# Patient Record
Sex: FEMALE | Race: WHITE | NOT HISPANIC OR LATINO | Employment: FULL TIME | URBAN - METROPOLITAN AREA
[De-identification: names, ages, dates, MRNs, and addresses within clinical notes are randomized per-mention and may not be internally consistent; named-entity substitution may affect disease eponyms.]

---

## 2017-05-30 ENCOUNTER — OFFICE VISIT (OUTPATIENT)
Dept: FAMILY MEDICINE | Facility: CLINIC | Age: 62
End: 2017-05-30
Payer: OTHER GOVERNMENT

## 2017-05-30 VITALS
DIASTOLIC BLOOD PRESSURE: 74 MMHG | BODY MASS INDEX: 28.53 KG/M2 | SYSTOLIC BLOOD PRESSURE: 110 MMHG | HEART RATE: 97 BPM | OXYGEN SATURATION: 98 % | HEIGHT: 63 IN | WEIGHT: 161 LBS

## 2017-05-30 DIAGNOSIS — Z01.818 PREOP EXAMINATION: ICD-10-CM

## 2017-05-30 PROCEDURE — 99213 OFFICE O/P EST LOW 20 MIN: CPT | Mod: ,,, | Performed by: FAMILY MEDICINE

## 2017-05-30 RX ORDER — PANTOPRAZOLE SODIUM 40 MG/1
1 TABLET, DELAYED RELEASE ORAL
COMMUNITY
Start: 2016-06-02 | End: 2020-10-01 | Stop reason: SDUPTHER

## 2017-05-30 RX ORDER — CELECOXIB 200 MG/1
1 CAPSULE ORAL DAILY
COMMUNITY
Start: 2017-01-13 | End: 2020-10-01

## 2017-05-30 RX ORDER — FLUTICASONE PROPIONATE 50 MCG
2 SPRAY, SUSPENSION (ML) NASAL DAILY PRN
COMMUNITY
Start: 2017-01-10 | End: 2020-10-28 | Stop reason: SDUPTHER

## 2017-05-30 RX ORDER — LEVOTHYROXINE SODIUM 75 UG/1
1 TABLET ORAL DAILY
COMMUNITY
Start: 2017-01-10 | End: 2017-08-12 | Stop reason: SDUPTHER

## 2017-05-30 RX ORDER — LORATADINE 10 MG/1
1 TABLET ORAL DAILY
COMMUNITY
Start: 2016-08-23 | End: 2017-07-17 | Stop reason: SDUPTHER

## 2017-05-30 RX ORDER — PITAVASTATIN CALCIUM 2.09 MG/1
1 TABLET, FILM COATED ORAL NIGHTLY
COMMUNITY
Start: 2017-01-10 | End: 2020-10-01 | Stop reason: SINTOL

## 2017-05-30 NOTE — PROGRESS NOTES
Subjective:       Patient ID: Michelle Frazier is a 61 y.o. female.    Chief Complaint: Pre-op Exam (cataracts Dr. Vangie Deal)    Right cataract      Review of Systems   Constitutional: Negative for fatigue, fever and unexpected weight change.   HENT: Negative for congestion, ear discharge, ear pain, hearing loss and sore throat.    Eyes: Negative for visual disturbance.   Respiratory: Negative for cough, chest tightness and shortness of breath.    Cardiovascular: Negative for chest pain and leg swelling.   Gastrointestinal: Negative for abdominal pain, constipation, diarrhea, nausea and vomiting.   Genitourinary: Negative for difficulty urinating.   Musculoskeletal: Negative for back pain.   Neurological: Negative for dizziness, weakness and headaches.   Hematological: Negative for adenopathy.   Psychiatric/Behavioral: Negative for suicidal ideas.       Past Medical History:   Diagnosis Date    GERD (gastroesophageal reflux disease)     Hypothyroidism       Past Surgical History:   Procedure Laterality Date    APPENDECTOMY      CARPAL TUNNEL RELEASE Right        Family History   Problem Relation Age of Onset    Arthritis Mother     Hearing loss Mother     Vision loss Mother     Arthritis Father        Social History     Social History    Marital status:      Spouse name: N/A    Number of children: N/A    Years of education: N/A     Social History Main Topics    Smoking status: Former Smoker     Types: Cigarettes     Quit date: 5/30/2012    Smokeless tobacco: None    Alcohol use No    Drug use: No    Sexual activity: Not Asked     Other Topics Concern    None     Social History Narrative    None       Current Outpatient Prescriptions   Medication Sig Dispense Refill    celecoxib (CELEBREX) 200 MG capsule Take 1 capsule by mouth once daily at 6am.      fluticasone (FLONASE) 50 mcg/actuation nasal spray 2 sprays by Nasal route daily as needed.      levothyroxine (SYNTHROID) 75 MCG tablet Take  "1 tablet by mouth once daily at 6am.      loratadine (CLARITIN) 10 mg tablet Take 1 tablet by mouth once daily at 6am.      pantoprazole (PROTONIX) 40 MG tablet Take 1 tablet by mouth as needed.      pitavastatin (LIVALO) 2 mg Tab tablet Take 1 tablet by mouth every evening.       No current facility-administered medications for this visit.        Review of patient's allergies indicates:  No Known Allergies  Objective:    HPI     Pre-op Exam    Additional comments: cataracts Dr. Vangie Deal       Last edited by Edna Perez MA on 5/30/2017 11:10 AM. (History)    Review of Systems   Constitutional: Negative for fatigue, fever and unexpected weight change.   HENT: Negative for congestion, ear discharge, ear pain, hearing loss and sore throat.    Eyes: Negative for visual disturbance.   Respiratory: Negative for cough, chest tightness and shortness of breath.    Cardiovascular: Negative for chest pain and leg swelling.   Gastrointestinal: Negative for abdominal pain, constipation, diarrhea, nausea and vomiting.   Genitourinary: Negative for difficulty urinating.   Musculoskeletal: Negative for back pain.   Neurological: Negative for dizziness, weakness and headaches.   Endo/Heme/Allergies: Negative for adenopathy.   Psychiatric/Behavioral: Negative for suicidal ideas.     Blood pressure 110/74, pulse 97, height 5' 2.5" (1.588 m), weight 73 kg (161 lb), SpO2 98 %. Body mass index is 28.98 kg/m².   Physical Exam   Constitutional: She appears well-developed and well-nourished. She is cooperative. No distress.   HENT:   Head: Normocephalic and atraumatic.   Right Ear: Tympanic membrane normal.   Left Ear: Tympanic membrane normal.   Nose: Nose normal.   Mouth/Throat: Oropharynx is clear and moist.   Eyes: Conjunctivae, EOM and lids are normal. Lids are everted and swept, no foreign bodies found. Right pupil is round and reactive. Left pupil is round and reactive.   Neck: Trachea normal. Neck supple.   Cardiovascular: " Regular rhythm, S1 normal and S2 normal.    Pulmonary/Chest: Breath sounds normal. No respiratory distress. She has no wheezes. She has no rales.   Abdominal: Soft. Bowel sounds are normal. She exhibits no mass. There is no tenderness. There is no rigidity and no guarding.   Musculoskeletal: Normal range of motion.   Lymphadenopathy:     She has no cervical adenopathy.     She has no axillary adenopathy.   Neurological: She is alert.   Skin: Skin is warm and dry.   Psychiatric: She has a normal mood and affect. Her behavior is normal. Thought content normal.           Assessment:       1. Preop examination        Plan:       Michelle was seen today for pre-op exam.    Diagnoses and all orders for this visit:    Preop examination    Cleared for surgery in two days. Instructed not to use NSAIDS

## 2017-07-17 RX ORDER — LORATADINE 10 MG/1
TABLET ORAL
Qty: 90 TABLET | Refills: 2 | Status: SHIPPED | OUTPATIENT
Start: 2017-07-17 | End: 2020-10-01 | Stop reason: ALTCHOICE

## 2017-08-14 RX ORDER — LEVOTHYROXINE SODIUM 75 UG/1
TABLET ORAL
Qty: 90 TABLET | Refills: 0 | Status: SHIPPED | OUTPATIENT
Start: 2017-08-14 | End: 2017-11-26 | Stop reason: SDUPTHER

## 2017-11-27 RX ORDER — LEVOTHYROXINE SODIUM 75 UG/1
TABLET ORAL
Qty: 90 TABLET | Refills: 0 | Status: SHIPPED | OUTPATIENT
Start: 2017-11-27 | End: 2018-03-18 | Stop reason: SDUPTHER

## 2018-03-19 RX ORDER — LEVOTHYROXINE SODIUM 75 UG/1
TABLET ORAL
Qty: 90 TABLET | Refills: 0 | Status: SHIPPED | OUTPATIENT
Start: 2018-03-19 | End: 2018-07-01 | Stop reason: SDUPTHER

## 2018-07-02 RX ORDER — LEVOTHYROXINE SODIUM 75 UG/1
TABLET ORAL
Qty: 90 TABLET | Refills: 0 | Status: SHIPPED | OUTPATIENT
Start: 2018-07-02 | End: 2018-11-10 | Stop reason: SDUPTHER

## 2018-11-12 RX ORDER — LEVOTHYROXINE SODIUM 75 UG/1
TABLET ORAL
Qty: 90 TABLET | Refills: 0 | Status: SHIPPED | OUTPATIENT
Start: 2018-11-12 | End: 2019-05-23 | Stop reason: SDUPTHER

## 2019-05-24 RX ORDER — LEVOTHYROXINE SODIUM 75 UG/1
TABLET ORAL
Qty: 90 TABLET | Refills: 0 | Status: SHIPPED | OUTPATIENT
Start: 2019-05-24 | End: 2020-02-11

## 2020-02-11 RX ORDER — LEVOTHYROXINE SODIUM 75 UG/1
TABLET ORAL
Qty: 90 TABLET | Refills: 0 | Status: SHIPPED | OUTPATIENT
Start: 2020-02-11 | End: 2020-10-02 | Stop reason: SDUPTHER

## 2020-10-01 ENCOUNTER — OFFICE VISIT (OUTPATIENT)
Dept: FAMILY MEDICINE | Facility: CLINIC | Age: 65
End: 2020-10-01
Payer: MEDICARE

## 2020-10-01 VITALS
DIASTOLIC BLOOD PRESSURE: 100 MMHG | OXYGEN SATURATION: 95 % | HEIGHT: 63 IN | HEART RATE: 78 BPM | TEMPERATURE: 98 F | SYSTOLIC BLOOD PRESSURE: 140 MMHG | WEIGHT: 178 LBS | BODY MASS INDEX: 31.54 KG/M2

## 2020-10-01 DIAGNOSIS — E03.9 ACQUIRED HYPOTHYROIDISM: Primary | ICD-10-CM

## 2020-10-01 DIAGNOSIS — E78.5 DYSLIPIDEMIA: ICD-10-CM

## 2020-10-01 DIAGNOSIS — R03.0 ELEVATED BLOOD-PRESSURE READING WITHOUT DIAGNOSIS OF HYPERTENSION: ICD-10-CM

## 2020-10-01 DIAGNOSIS — K21.9 GASTROESOPHAGEAL REFLUX DISEASE, UNSPECIFIED WHETHER ESOPHAGITIS PRESENT: ICD-10-CM

## 2020-10-01 DIAGNOSIS — Z12.31 VISIT FOR SCREENING MAMMOGRAM: ICD-10-CM

## 2020-10-01 DIAGNOSIS — Z78.0 MENOPAUSE: ICD-10-CM

## 2020-10-01 PROBLEM — F32.A DEPRESSION: Status: ACTIVE | Noted: 2020-10-01

## 2020-10-01 PROBLEM — J30.1 HAY FEVER: Status: ACTIVE | Noted: 2020-10-01

## 2020-10-01 PROBLEM — K57.90 DIVERTICULOSIS OF INTESTINE: Status: ACTIVE | Noted: 2020-10-01

## 2020-10-01 PROBLEM — F32.A DEPRESSION: Status: RESOLVED | Noted: 2020-10-01 | Resolved: 2020-10-01

## 2020-10-01 PROBLEM — M25.50 ARTHRALGIA OF MULTIPLE JOINTS: Status: ACTIVE | Noted: 2020-10-01

## 2020-10-01 PROCEDURE — 99203 PR OFFICE/OUTPT VISIT, NEW, LEVL III, 30-44 MIN: ICD-10-PCS | Mod: S$GLB,,, | Performed by: INTERNAL MEDICINE

## 2020-10-01 PROCEDURE — 99203 OFFICE O/P NEW LOW 30 MIN: CPT | Mod: S$GLB,,, | Performed by: INTERNAL MEDICINE

## 2020-10-01 RX ORDER — PANTOPRAZOLE SODIUM 40 MG/1
40 TABLET, DELAYED RELEASE ORAL DAILY
Qty: 90 TABLET | Refills: 1 | Status: SHIPPED | OUTPATIENT
Start: 2020-10-01 | End: 2021-05-10 | Stop reason: SDUPTHER

## 2020-10-01 RX ORDER — LEVOTHYROXINE SODIUM 75 UG/1
75 TABLET ORAL DAILY
Qty: 90 TABLET | Refills: 0 | Status: CANCELLED | OUTPATIENT
Start: 2020-10-01

## 2020-10-01 RX ORDER — CETIRIZINE HYDROCHLORIDE 10 MG/1
10 TABLET ORAL DAILY
COMMUNITY
End: 2021-10-11

## 2020-10-01 NOTE — PROGRESS NOTES
Subjective:       Patient ID: Michelle Frazier is a 65 y.o. female.    Chief Complaint: Establish Care (new patient establishment)    Here to reestablish care; she hasn't been here in over 3 years.  She was seeing Dr. Cohen or Irina STARK in the past.  She has a h/o hypothyroidism, hyperlipidemia.  She has tried multiple statins and has gotten headaches with all of them.  She has been breaking her thyroid medication in half; she reports she started having some palpitations and called for instructions and was told to break meds in half but never had follow up or repeat labs.     Review of Systems   Constitutional: Positive for fatigue. Negative for chills, fever and unexpected weight change.   HENT: Positive for postnasal drip and sinus pressure. Negative for congestion, hearing loss, rhinorrhea, trouble swallowing and voice change.    Eyes: Negative for photophobia and visual disturbance.   Respiratory: Negative for apnea, cough, choking, chest tightness, shortness of breath and wheezing.    Cardiovascular: Negative for chest pain, palpitations and leg swelling.   Gastrointestinal: Negative for abdominal pain, blood in stool, constipation, diarrhea, nausea, rectal pain and vomiting.   Endocrine: Negative for cold intolerance, heat intolerance, polydipsia and polyuria.   Genitourinary: Negative for decreased urine volume, difficulty urinating, dysuria, frequency, genital sores, hematuria, menstrual problem, pelvic pain, urgency, vaginal bleeding and vaginal discharge.   Musculoskeletal: Positive for neck pain and neck stiffness. Negative for back pain, gait problem, joint swelling and myalgias. Arthralgias: fingers, feet right elbow.   Skin: Negative for color change, rash and wound.   Allergic/Immunologic: Negative for environmental allergies and food allergies.   Neurological: Positive for headaches (sinus; doesn't have headaches if takes zyrtec regularly). Negative for dizziness, tremors, seizures, syncope,  facial asymmetry, speech difficulty, weakness, light-headedness and numbness.   Hematological: Negative for adenopathy. Does not bruise/bleed easily.   Psychiatric/Behavioral: Negative for confusion, hallucinations, sleep disturbance and suicidal ideas. The patient is nervous/anxious.        Past Medical History:   Diagnosis Date    Arthralgia of multiple joints 10/1/2020    Diverticulosis of intestine 10/1/2020    Dyslipidemia 10/1/2020    GERD (gastroesophageal reflux disease)     Hypothyroidism       Past Surgical History:   Procedure Laterality Date    APPENDECTOMY      CARPAL TUNNEL RELEASE Right     COLONOSCOPY      EYE SURGERY      cataracts       Family History   Problem Relation Age of Onset    Arthritis Mother     Hearing loss Mother     Vision loss Mother     Arthritis Father     Colon cancer Paternal Grandmother         >80    Breast cancer Neg Hx        Social History     Socioeconomic History    Marital status:      Spouse name: Not on file    Number of children: Not on file    Years of education: Not on file    Highest education level: Not on file   Occupational History    Not on file   Social Needs    Financial resource strain: Not on file    Food insecurity     Worry: Not on file     Inability: Not on file    Transportation needs     Medical: Not on file     Non-medical: Not on file   Tobacco Use    Smoking status: Former Smoker     Types: Cigarettes     Quit date: 2012     Years since quittin.3    Smokeless tobacco: Never Used   Substance and Sexual Activity    Alcohol use: No    Drug use: No    Sexual activity: Yes     Partners: Male     Birth control/protection: Post-menopausal     Comment:    Lifestyle    Physical activity     Days per week: Not on file     Minutes per session: Not on file    Stress: Only a little   Relationships    Social connections     Talks on phone: Not on file     Gets together: Not on file     Attends [employer]  "service: Not on file     Active member of club or organization: Not on file     Attends meetings of clubs or organizations: Not on file     Relationship status: Not on file   Other Topics Concern    Not on file   Social History Narrative    Live with        Current Outpatient Medications   Medication Sig Dispense Refill    cetirizine (ZYRTEC) 10 MG tablet Take 10 mg by mouth once daily.      fluticasone (FLONASE) 50 mcg/actuation nasal spray 2 sprays by Nasal route daily as needed.      pantoprazole (PROTONIX) 40 MG tablet Take 1 tablet (40 mg total) by mouth once daily. 90 tablet 1    SYNTHROID 75 mcg tablet TAKE ONE TABLET BY MOUTH ONCE DAILY (Patient taking differently: Take 75 mcg by mouth once daily. Take 0.5 tablets daily) 90 tablet 0     No current facility-administered medications for this visit.        Review of patient's allergies indicates:  No Known Allergies  Objective:    HPI     Establish Care      Additional comments: new patient establishment          Last edited by Jia Lara MA on 10/1/2020 10:50 AM. (History)      Blood pressure (!) 140/100, pulse 78, temperature 97.9 °F (36.6 °C), temperature source Temporal, height 5' 2.5" (1.588 m), weight 80.7 kg (178 lb), SpO2 95 %. Body mass index is 32.04 kg/m².   Physical Exam  Vitals signs and nursing note reviewed.   Constitutional:       General: She is not in acute distress.     Appearance: She is well-developed. She is obese. She is not ill-appearing, toxic-appearing or diaphoretic.   HENT:      Head: Normocephalic and atraumatic.      Right Ear: Hearing, tympanic membrane, ear canal and external ear normal.      Left Ear: Hearing, tympanic membrane, ear canal and external ear normal.      Nose: Nose normal.      Mouth/Throat:      Pharynx: Uvula midline.   Eyes:      General: Lids are normal. No scleral icterus.        Right eye: No discharge.         Left eye: No discharge.      Conjunctiva/sclera: Conjunctivae normal.      Right " eye: Right conjunctiva is not injected. No hemorrhage.     Left eye: Left conjunctiva is not injected. No hemorrhage.     Pupils: Pupils are equal, round, and reactive to light.   Neck:      Thyroid: No thyromegaly.      Vascular: No carotid bruit.   Cardiovascular:      Rate and Rhythm: Normal rate and regular rhythm.      Pulses:           Dorsalis pedis pulses are 2+ on the right side and 2+ on the left side.      Heart sounds: Normal heart sounds. No murmur. No friction rub. No gallop.    Pulmonary:      Effort: Pulmonary effort is normal. No respiratory distress.      Breath sounds: Normal breath sounds. No wheezing, rhonchi or rales.   Abdominal:      General: Bowel sounds are normal. There is no distension or abdominal bruit.      Palpations: Abdomen is soft. There is no mass or pulsatile mass.      Tenderness: There is no abdominal tenderness. There is no guarding or rebound.      Hernia: No hernia is present.   Musculoskeletal:      Right lower leg: No edema.      Left lower leg: No edema.   Lymphadenopathy:      Cervical: No cervical adenopathy.   Skin:     General: Skin is warm and dry.   Neurological:      General: No focal deficit present.      Mental Status: She is alert.      Motor: No tremor.      Deep Tendon Reflexes: Reflexes are normal and symmetric.   Psychiatric:         Mood and Affect: Mood normal.         Speech: Speech normal.         Behavior: Behavior normal.             Assessment:       1. Acquired hypothyroidism    2. Gastroesophageal reflux disease, unspecified whether esophagitis present    3. Elevated blood-pressure reading without diagnosis of hypertension    4. Visit for screening mammogram    5. Menopause    6. Dyslipidemia        Plan:       Michelle was seen today for establish care.    Diagnoses and all orders for this visit:    Acquired hypothyroidism  Comments:  Check labs before refilling meds.  She gets name brand  Orders:  -     TSH; Future  -     TSH    Gastroesophageal  reflux disease, unspecified whether esophagitis present  -     pantoprazole (PROTONIX) 40 MG tablet; Take 1 tablet (40 mg total) by mouth once daily.    Elevated blood-pressure reading without diagnosis of hypertension  Comments:  Limit salt, caffeine.  May be nerves about new doctor; recheck in a month  Orders:  -     Comprehensive metabolic panel; Future  -     Urinalysis w/reflex to Microscopic; Future  -     Comprehensive metabolic panel  -     Urinalysis w/reflex to Microscopic    Visit for screening mammogram  -     Mammo Digital Screening Bilat; Future    Menopause  -     DXA Bone Density Spine And Hip; Future    Dyslipidemia  -     Lipid Panel; Future  -     Comprehensive metabolic panel; Future  -     Lipid Panel  -     Comprehensive metabolic panel    Other orders  -     Cancel: levothyroxine (SYNTHROID) 75 MCG tablet; Take 1 tablet (75 mcg total) by mouth once daily.

## 2020-10-02 DIAGNOSIS — E03.9 ACQUIRED HYPOTHYROIDISM: ICD-10-CM

## 2020-10-02 DIAGNOSIS — Z86.010 PERSONAL HISTORY OF COLONIC POLYPS: Primary | ICD-10-CM

## 2020-10-02 RX ORDER — LEVOTHYROXINE SODIUM 50 UG/1
50 TABLET ORAL DAILY
Qty: 90 TABLET | Refills: 1 | Status: SHIPPED | OUTPATIENT
Start: 2020-10-02 | End: 2021-05-10 | Stop reason: SDUPTHER

## 2020-10-06 LAB
ALBUMIN SERPL-MCNC: 4.6 G/DL (ref 3.8–4.8)
ALBUMIN/GLOB SERPL: 2.3 {RATIO} (ref 1.2–2.2)
ALP SERPL-CCNC: 128 IU/L (ref 39–117)
ALT SERPL-CCNC: 18 IU/L (ref 0–32)
APPEARANCE UR: CLEAR
AST SERPL-CCNC: 22 IU/L (ref 0–40)
BACTERIA #/AREA URNS HPF: NORMAL /[HPF]
BACTERIA UR CULT: ABNORMAL
BACTERIA UR CULT: ABNORMAL
BILIRUB SERPL-MCNC: 0.3 MG/DL (ref 0–1.2)
BILIRUB UR QL STRIP: NEGATIVE
BUN SERPL-MCNC: 10 MG/DL (ref 8–27)
BUN/CREAT SERPL: 13 (ref 12–28)
CALCIUM SERPL-MCNC: 9.5 MG/DL (ref 8.7–10.3)
CHLORIDE SERPL-SCNC: 109 MMOL/L (ref 96–106)
CHOLEST SERPL-MCNC: 285 MG/DL (ref 100–199)
CO2 SERPL-SCNC: 23 MMOL/L (ref 20–29)
COLOR UR: YELLOW
CREAT SERPL-MCNC: 0.8 MG/DL (ref 0.57–1)
EPI CELLS #/AREA URNS HPF: NORMAL /HPF (ref 0–10)
GLOBULIN SER CALC-MCNC: 2 G/DL (ref 1.5–4.5)
GLUCOSE SERPL-MCNC: 100 MG/DL (ref 65–99)
GLUCOSE UR QL: NEGATIVE
HDLC SERPL-MCNC: 76 MG/DL
HGB UR QL STRIP: NEGATIVE
KETONES UR QL STRIP: NEGATIVE
LDLC SERPL CALC-MCNC: 197 MG/DL (ref 0–99)
LEUKOCYTE ESTERASE UR QL STRIP: ABNORMAL
MICRO URNS: ABNORMAL
MUCOUS THREADS URNS QL MICRO: PRESENT
NITRITE UR QL STRIP: NEGATIVE
PH UR STRIP: 5.5 [PH] (ref 5–7.5)
POTASSIUM SERPL-SCNC: 4.3 MMOL/L (ref 3.5–5.2)
PROT SERPL-MCNC: 6.6 G/DL (ref 6–8.5)
PROT UR QL STRIP: NEGATIVE
RBC #/AREA URNS HPF: NORMAL /HPF (ref 0–2)
SODIUM SERPL-SCNC: 145 MMOL/L (ref 134–144)
SP GR UR: 1.01 (ref 1–1.03)
TRIGL SERPL-MCNC: 76 MG/DL (ref 0–149)
TSH SERPL DL<=0.005 MIU/L-ACNC: 3.05 UIU/ML (ref 0.45–4.5)
URINALYSIS REFLEX: ABNORMAL
UROBILINOGEN UR STRIP-MCNC: 0.2 MG/DL (ref 0.2–1)
VLDLC SERPL CALC-MCNC: 12 MG/DL (ref 5–40)
WBC #/AREA URNS HPF: NORMAL /HPF (ref 0–5)

## 2020-10-07 ENCOUNTER — HOSPITAL ENCOUNTER (OUTPATIENT)
Dept: RADIOLOGY | Facility: HOSPITAL | Age: 65
Discharge: HOME | End: 2020-10-07
Attending: INTERNAL MEDICINE
Payer: MEDICARE

## 2020-10-07 DIAGNOSIS — Z78.0 MENOPAUSE: ICD-10-CM

## 2020-10-07 PROCEDURE — 77080 DXA BONE DENSITY AXIAL: CPT | Mod: TC,PO

## 2020-10-20 ENCOUNTER — HOSPITAL ENCOUNTER (OUTPATIENT)
Dept: RADIOLOGY | Facility: HOSPITAL | Age: 65
Discharge: HOME | End: 2020-10-20
Attending: INTERNAL MEDICINE
Payer: MEDICARE

## 2020-10-20 DIAGNOSIS — Z12.31 VISIT FOR SCREENING MAMMOGRAM: ICD-10-CM

## 2020-10-20 PROCEDURE — 77067 SCR MAMMO BI INCL CAD: CPT | Mod: TC,PO

## 2020-10-28 ENCOUNTER — OFFICE VISIT (OUTPATIENT)
Dept: FAMILY MEDICINE | Facility: CLINIC | Age: 65
End: 2020-10-28
Payer: MEDICARE

## 2020-10-28 VITALS
BODY MASS INDEX: 31.71 KG/M2 | TEMPERATURE: 97 F | OXYGEN SATURATION: 98 % | SYSTOLIC BLOOD PRESSURE: 138 MMHG | WEIGHT: 179 LBS | HEIGHT: 63 IN | HEART RATE: 76 BPM | DIASTOLIC BLOOD PRESSURE: 62 MMHG

## 2020-10-28 DIAGNOSIS — M25.50 ARTHRALGIA OF MULTIPLE JOINTS: ICD-10-CM

## 2020-10-28 DIAGNOSIS — E78.00 PURE HYPERCHOLESTEROLEMIA: ICD-10-CM

## 2020-10-28 DIAGNOSIS — M85.89 OSTEOPENIA OF MULTIPLE SITES: ICD-10-CM

## 2020-10-28 DIAGNOSIS — R03.0 ELEVATED BLOOD-PRESSURE READING WITHOUT DIAGNOSIS OF HYPERTENSION: ICD-10-CM

## 2020-10-28 DIAGNOSIS — Z86.010 PERSONAL HISTORY OF COLONIC POLYPS: ICD-10-CM

## 2020-10-28 DIAGNOSIS — E03.9 ACQUIRED HYPOTHYROIDISM: Primary | ICD-10-CM

## 2020-10-28 PROCEDURE — 99213 PR OFFICE/OUTPT VISIT, EST, LEVL III, 20-29 MIN: ICD-10-PCS | Mod: S$GLB,,, | Performed by: INTERNAL MEDICINE

## 2020-10-28 PROCEDURE — 99213 OFFICE O/P EST LOW 20 MIN: CPT | Mod: S$GLB,,, | Performed by: INTERNAL MEDICINE

## 2020-10-28 RX ORDER — FLUTICASONE PROPIONATE 50 MCG
2 SPRAY, SUSPENSION (ML) NASAL DAILY PRN
Qty: 48 G | Refills: 3 | Status: SHIPPED | OUTPATIENT
Start: 2020-10-28 | End: 2021-10-11

## 2020-10-28 RX ORDER — EZETIMIBE 10 MG/1
10 TABLET ORAL DAILY
Qty: 90 TABLET | Refills: 1 | Status: SHIPPED | OUTPATIENT
Start: 2020-10-28 | End: 2021-05-10 | Stop reason: SDUPTHER

## 2020-10-28 RX ORDER — DICLOFENAC SODIUM 10 MG/G
2 GEL TOPICAL DAILY
COMMUNITY
Start: 2020-10-28 | End: 2023-12-20

## 2020-10-28 NOTE — PATIENT INSTRUCTIONS
Your recent labs revealed an elevation in your fasting blood sugar.  This indicates that you may be at risk for developing diabetes.  You can reduce this risk by reducing or eliminating sweets from your diet, using wheat bread/pasta, limiting potato intake, and walking at least 30 minutes per day.  We will do follow bloodwork at your next appointment.    Your bone density is low which may increase your risk of fracture in the future.  You should be getting at least 1200mg calcium and 400IU of Vitamin D daily.  Caltrate D or Oscal D are good choices if you aren't getting enough dietary calcium.  You should exercise regularly (walk at least 30 minutes daily) and avoid smoking.  You should repeat bone density testing in 2 years.

## 2020-10-28 NOTE — PROGRESS NOTES
Subjective:       Patient ID: Michelle Frazier is a 65 y.o. female.    Chief Complaint: Follow-up (4 weeks followup labs)    Here for follow up on blood pressure and labs.      Review of Systems   Constitutional: Negative for activity change, appetite change, chills, diaphoresis, fatigue, fever and unexpected weight change.   HENT: Positive for hearing loss, postnasal drip and rhinorrhea. Negative for congestion, ear discharge, ear pain, nosebleeds, sinus pressure, sinus pain, sneezing, sore throat, tinnitus, trouble swallowing and voice change.    Eyes: Negative for photophobia, pain, discharge, redness, itching and visual disturbance.   Respiratory: Negative for apnea, cough, choking, chest tightness, shortness of breath, wheezing and stridor.    Cardiovascular: Negative for chest pain, palpitations and leg swelling.   Gastrointestinal: Negative for abdominal distention, abdominal pain, anal bleeding, blood in stool, constipation, diarrhea, nausea, rectal pain and vomiting.   Endocrine: Negative for cold intolerance, heat intolerance, polydipsia, polyphagia and polyuria.   Genitourinary: Negative for decreased urine volume, difficulty urinating, dysuria, frequency, genital sores, hematuria, menstrual problem and urgency.   Musculoskeletal: Positive for arthralgias, joint swelling, neck pain and neck stiffness. Negative for back pain, gait problem and myalgias.   Skin: Positive for rash. Negative for color change, pallor and wound.   Allergic/Immunologic: Negative for environmental allergies and food allergies.   Neurological: Positive for headaches. Negative for dizziness, tremors, seizures, syncope, facial asymmetry, speech difficulty, weakness, light-headedness and numbness.   Hematological: Negative for adenopathy. Does not bruise/bleed easily.   Psychiatric/Behavioral: Positive for dysphoric mood and sleep disturbance. Negative for confusion, decreased concentration, hallucinations, self-injury and suicidal ideas.  The patient is nervous/anxious.        Past Medical History:   Diagnosis Date    Arthralgia of multiple joints 10/1/2020    Diverticulosis of intestine 10/1/2020    Dyslipidemia 10/1/2020    GERD (gastroesophageal reflux disease)     Hypothyroidism       Past Surgical History:   Procedure Laterality Date    APPENDECTOMY      CARPAL TUNNEL RELEASE Right     COLONOSCOPY      EYE SURGERY      cataracts       Family History   Problem Relation Age of Onset    Arthritis Mother     Hearing loss Mother     Vision loss Mother     Arthritis Father     Colon cancer Paternal Grandmother         >80    Breast cancer Paternal Grandmother        Social History     Socioeconomic History    Marital status:      Spouse name: Not on file    Number of children: Not on file    Years of education: Not on file    Highest education level: Not on file   Occupational History    Not on file   Social Needs    Financial resource strain: Not very hard    Food insecurity     Worry: Never true     Inability: Never true    Transportation needs     Medical: No     Non-medical: No   Tobacco Use    Smoking status: Former Smoker     Types: Cigarettes     Quit date: 2012     Years since quittin.4    Smokeless tobacco: Never Used   Substance and Sexual Activity    Alcohol use: No     Frequency: Never    Drug use: No    Sexual activity: Yes     Partners: Male     Birth control/protection: Post-menopausal     Comment:    Lifestyle    Physical activity     Days per week: 2 days     Minutes per session: 30 min    Stress: Very much   Relationships    Social connections     Talks on phone: Never     Gets together: Never     Attends Sikhism service: Not on file     Active member of club or organization: Yes     Attends meetings of clubs or organizations: More than 4 times per year     Relationship status:    Other Topics Concern    Not on file   Social History Narrative    Live with   "      Current Outpatient Medications   Medication Sig Dispense Refill    cetirizine (ZYRTEC) 10 MG tablet Take 10 mg by mouth once daily.      fluticasone propionate (FLONASE) 50 mcg/actuation nasal spray 2 sprays (100 mcg total) by Each Nostril route daily as needed. 48 g 3    levothyroxine (SYNTHROID) 50 MCG tablet Take 1 tablet (50 mcg total) by mouth once daily. 90 tablet 1    pantoprazole (PROTONIX) 40 MG tablet Take 1 tablet (40 mg total) by mouth once daily. 90 tablet 1    diclofenac sodium (VOLTAREN) 1 % Gel Apply 2 g topically once daily.      ezetimibe (ZETIA) 10 mg tablet Take 1 tablet (10 mg total) by mouth once daily. 90 tablet 1     No current facility-administered medications for this visit.        Review of patient's allergies indicates:  No Known Allergies  Objective:    HPI     Follow-up      Additional comments: 4 weeks followup labs          Last edited by Jia Lara MA on 10/28/2020 11:12 AM. (History)      Blood pressure 138/62, pulse 76, temperature 97.2 °F (36.2 °C), temperature source Temporal, height 5' 2.5" (1.588 m), weight 81.2 kg (179 lb), SpO2 98 %. Body mass index is 32.22 kg/m².   Physical Exam  Vitals signs and nursing note reviewed.   Constitutional:       General: She is not in acute distress.     Appearance: She is well-developed. She is obese. She is not ill-appearing, toxic-appearing or diaphoretic.   HENT:      Head: Normocephalic and atraumatic.   Eyes:      General: No scleral icterus.        Right eye: No discharge.         Left eye: No discharge.      Conjunctiva/sclera: Conjunctivae normal.   Neck:      Vascular: No carotid bruit.   Cardiovascular:      Rate and Rhythm: Normal rate and regular rhythm.      Heart sounds: Normal heart sounds. No murmur.   Pulmonary:      Effort: Pulmonary effort is normal. No respiratory distress.      Breath sounds: Normal breath sounds. No decreased breath sounds, wheezing, rhonchi or rales.   Abdominal:      General: There is " no distension.      Palpations: Abdomen is soft.      Tenderness: There is no abdominal tenderness. There is no guarding or rebound.   Musculoskeletal:      Left hand: She exhibits deformity (thickening of PIP joints of 2nd and 3rd fingers).      Right lower leg: No edema.      Left lower leg: No edema.   Skin:     General: Skin is warm and dry.   Neurological:      Mental Status: She is alert.      Motor: No tremor.   Psychiatric:         Mood and Affect: Mood normal.         Speech: Speech normal.         Behavior: Behavior normal.           Office Visit on 10/01/2020   Component Date Value Ref Range Status    Cholesterol 10/01/2020 285* 100 - 199 mg/dL Final    Triglycerides 10/01/2020 76  0 - 149 mg/dL Final    HDL 10/01/2020 76  >39 mg/dL Final    VLDL Cholesterol Juan Manuel 10/01/2020 12  5 - 40 mg/dL Final    LDL Calculated 10/01/2020 197* 0 - 99 mg/dL Final    Glucose 10/01/2020 100* 65 - 99 mg/dL Final    BUN 10/01/2020 10  8 - 27 mg/dL Final    Creatinine 10/01/2020 0.80  0.57 - 1.00 mg/dL Final    eGFR if non African American 10/01/2020 77  >59 mL/min/1.73 Final    eGFR if African American 10/01/2020 89  >59 mL/min/1.73 Final    BUN/Creatinine Ratio 10/01/2020 13  12 - 28 Final    Sodium 10/01/2020 145* 134 - 144 mmol/L Final    Potassium 10/01/2020 4.3  3.5 - 5.2 mmol/L Final    Chloride 10/01/2020 109* 96 - 106 mmol/L Final    CO2 10/01/2020 23  20 - 29 mmol/L Final    Calcium 10/01/2020 9.5  8.7 - 10.3 mg/dL Final    Protein, Total 10/01/2020 6.6  6.0 - 8.5 g/dL Final    Albumin 10/01/2020 4.6  3.8 - 4.8 g/dL Final    Globulin, Total 10/01/2020 2.0  1.5 - 4.5 g/dL Final    Albumin/Globulin Ratio 10/01/2020 2.3* 1.2 - 2.2 Final    Total Bilirubin 10/01/2020 0.3  0.0 - 1.2 mg/dL Final    Alkaline Phosphatase 10/01/2020 128* 39 - 117 IU/L Final    AST 10/01/2020 22  0 - 40 IU/L Final    ALT 10/01/2020 18  0 - 32 IU/L Final    TSH 10/01/2020 3.050  0.450 - 4.500 uIU/mL Final    Specific  Gravity, UA 10/01/2020 1.009  1.005 - 1.030 Final    pH, UA 10/01/2020 5.5  5.0 - 7.5 Final    Color, UA 10/01/2020 Yellow  Yellow Final    Clarity, UA 10/01/2020 Clear  Clear Final    Leukocytes, UA 10/01/2020 Trace* Negative Final    Protein, UA 10/01/2020 Negative  Negative/Trace Final    Glucose, UA 10/01/2020 Negative  Negative Final    Ketones, UA 10/01/2020 Negative  Negative Final    Occult Blood UA 10/01/2020 Negative  Negative Final    Bilirubin, UA 10/01/2020 Negative  Negative Final    Urobilinogen, UA 10/01/2020 0.2  0.2 - 1.0 mg/dL Final    Nitrite, UA 10/01/2020 Negative  Negative Final    Microscopic Examination 10/01/2020 See below:   Final    Microscopic was indicated and was performed.    Urinalysis Reflex 10/01/2020 Comment   Final    This specimen has reflexed to a Urine Culture.    WBC, UA 10/01/2020 0-5  0 - 5 /hpf Final    RBC, UA 10/01/2020 None seen  0 - 2 /hpf Final    Epithelial Cells (non renal) 10/01/2020 0-10  0 - 10 /hpf Final    Mucus, UA 10/01/2020 Present  Not Estab. Final    Bacteria, UA 10/01/2020 None seen  None seen/Few Final    Urine Culture, Routine 10/01/2020 Final report*  Final    Result 1 10/01/2020 Comment*  Final    Comment: Beta hemolytic Streptococcus, group B  Penicillin and ampicillin are drugs of choice for treatment of  beta-hemolytic streptococcal infections. Susceptibility testing of  penicillins and other beta-lactam agents approved by the FDA for  treatment of beta-hemolytic streptococcal infections need not be  performed routinely because nonsusceptible isolates are extremely  rare in any beta-hemolytic streptococcus and have not been reported  for Streptococcus pyogenes (group A). (CLSI)  10,000-25,000 colony forming units per mL     ]  Assessment:       1. Acquired hypothyroidism    2. Pure hypercholesterolemia    3. Osteopenia of multiple sites    4. Arthralgia of multiple joints    5. Elevated blood-pressure reading without diagnosis  of hypertension    6. Personal history of colonic polyps        Plan:       Michelle was seen today for follow-up.    Diagnoses and all orders for this visit:    Acquired hypothyroidism  Comments:  TSH in range    Pure hypercholesterolemia  Comments:  Intolerant to statins.  Will try zetia    Orders:  -     ezetimibe (ZETIA) 10 mg tablet; Take 1 tablet (10 mg total) by mouth once daily.    Osteopenia of multiple sites  Comments:  Discussed calcium, vitamin D supplements, exercise    Arthralgia of multiple joints  Comments:  Continue NSAIDs,  Discussed voltaren gel for hands  Orders:  -     diclofenac sodium (VOLTAREN) 1 % Gel; Apply 2 g topically once daily.    Elevated blood-pressure reading without diagnosis of hypertension  Comments:  Better today; continue to monitor    Personal history of colonic polyps  -     Ambulatory referral/consult to Gastroenterology; Future    Other orders  -     fluticasone propionate (FLONASE) 50 mcg/actuation nasal spray; 2 sprays (100 mcg total) by Each Nostril route daily as needed.

## 2020-11-04 ENCOUNTER — OFFICE VISIT (OUTPATIENT)
Dept: URGENT CARE | Facility: CLINIC | Age: 65
End: 2020-11-04
Payer: MEDICARE

## 2020-11-04 VITALS
DIASTOLIC BLOOD PRESSURE: 88 MMHG | HEART RATE: 90 BPM | RESPIRATION RATE: 16 BRPM | OXYGEN SATURATION: 96 % | SYSTOLIC BLOOD PRESSURE: 139 MMHG | TEMPERATURE: 98 F

## 2020-11-04 DIAGNOSIS — J02.9 SORE THROAT: ICD-10-CM

## 2020-11-04 DIAGNOSIS — U07.1 COVID-19 VIRUS DETECTED: ICD-10-CM

## 2020-11-04 DIAGNOSIS — R52 GENERALIZED BODY ACHES: Primary | ICD-10-CM

## 2020-11-04 LAB
CTP QC/QA: YES
FLUAV AG NPH QL: NEGATIVE
FLUBV AG NPH QL: NEGATIVE
S PYO RRNA THROAT QL PROBE: NEGATIVE
SARS-COV-2 RDRP RESP QL NAA+PROBE: POSITIVE

## 2020-11-04 PROCEDURE — 87804 POCT INFLUENZA A/B: ICD-10-PCS | Mod: 59,QW,, | Performed by: NURSE PRACTITIONER

## 2020-11-04 PROCEDURE — 87880 POCT RAPID STREP A: ICD-10-PCS | Mod: QW,,, | Performed by: NURSE PRACTITIONER

## 2020-11-04 PROCEDURE — 87880 STREP A ASSAY W/OPTIC: CPT | Mod: QW,,, | Performed by: NURSE PRACTITIONER

## 2020-11-04 PROCEDURE — 99204 OFFICE O/P NEW MOD 45 MIN: CPT | Mod: S$GLB,,, | Performed by: NURSE PRACTITIONER

## 2020-11-04 PROCEDURE — 87804 INFLUENZA ASSAY W/OPTIC: CPT | Mod: QW,,, | Performed by: NURSE PRACTITIONER

## 2020-11-04 PROCEDURE — U0002: ICD-10-PCS | Mod: QW,S$GLB,, | Performed by: NURSE PRACTITIONER

## 2020-11-04 PROCEDURE — 99204 PR OFFICE/OUTPT VISIT, NEW, LEVL IV, 45-59 MIN: ICD-10-PCS | Mod: S$GLB,,, | Performed by: NURSE PRACTITIONER

## 2020-11-04 PROCEDURE — U0002 COVID-19 LAB TEST NON-CDC: HCPCS | Mod: QW,S$GLB,, | Performed by: NURSE PRACTITIONER

## 2020-11-04 NOTE — LETTER
"Michelle"Kuldip Frazeir was seen and treated in our Urgent Care on 11/4/2020.     COVID-19 is present in our communities across the state. There is limited testing for COVID at this time, so not all patients can be tested. In this situation, your employee meets the following criteria:    Michelle Frazier has met the criteria for COVID-19 testing and has a POSITIVE result. She can return to work once they are asymptomatic for 72 hours without the use of fever reducing medications AND at least ten days from the first positive result.     If you have any questions or concerns, or if I can be of further assistance, please do not hesitate to contact me.    Sincerely,       Annamarie STARK"

## 2020-11-04 NOTE — PROGRESS NOTES
Subjective:       Patient ID: Michelle Frazier is a 65 y.o. female.    Chief Complaint: No chief complaint on file.    Patient presents today for covid testing. She is having HA, fever, body aches and mild sob.     Review of Systems   Constitutional: Positive for fatigue and fever. Negative for chills.   HENT: Negative for congestion.    Respiratory: Positive for cough and shortness of breath.    Cardiovascular: Negative for chest pain.   Gastrointestinal: Negative for abdominal pain, diarrhea, nausea and vomiting.   Genitourinary: Negative for dysuria.   Musculoskeletal: Positive for myalgias. Negative for neck stiffness.   Neurological: Positive for headaches. Negative for dizziness and light-headedness.       Objective:      Vitals:    11/04/20 1352   BP: 139/88   Pulse: 90   Resp: 16   Temp: 97.9 °F (36.6 °C)     Physical Exam  Constitutional:       General: She is not in acute distress.     Appearance: Normal appearance. She is normal weight. She is not ill-appearing.   HENT:      Head: Normocephalic and atraumatic.      Right Ear: External ear normal.      Left Ear: External ear normal.   Eyes:      General: No scleral icterus.  Cardiovascular:      Rate and Rhythm: Normal rate and regular rhythm.      Heart sounds: Normal heart sounds.   Pulmonary:      Effort: Pulmonary effort is normal.      Breath sounds: Normal breath sounds.   Skin:     Capillary Refill: Capillary refill takes less than 2 seconds.   Neurological:      Mental Status: She is alert and oriented to person, place, and time.   Psychiatric:         Mood and Affect: Mood normal.         Behavior: Behavior normal.         Thought Content: Thought content normal.         Judgment: Judgment normal.         Assessment:       1. Generalized body aches    2. Sore throat        Plan:       Generalized body aches  -     POCT COVID-19 Rapid Screening  -     POCT Influenza A/B    Sore throat  -     POCT rapid strep A    Rapid COVID test is positive. Patient  notified in clinic. ED precautions discussed.   No follow-ups on file.

## 2020-11-11 ENCOUNTER — HOSPITAL ENCOUNTER (INPATIENT)
Facility: HOSPITAL | Age: 65
LOS: 4 days | Discharge: HOME | DRG: 177 | End: 2020-11-17
Attending: EMERGENCY MEDICINE | Admitting: INTERNAL MEDICINE
Payer: MEDICARE

## 2020-11-11 DIAGNOSIS — J12.82 PNEUMONIA DUE TO COVID-19 VIRUS: Primary | ICD-10-CM

## 2020-11-11 DIAGNOSIS — U07.1 PNEUMONIA DUE TO COVID-19 VIRUS: Primary | ICD-10-CM

## 2020-11-11 DIAGNOSIS — Z20.822 SUSPECTED COVID-19 VIRUS INFECTION: ICD-10-CM

## 2020-11-11 PROBLEM — J96.01 ACUTE HYPOXEMIC RESPIRATORY FAILURE: Status: ACTIVE | Noted: 2020-11-11

## 2020-11-11 PROBLEM — E86.0 DEHYDRATION: Status: ACTIVE | Noted: 2020-11-11

## 2020-11-11 PROBLEM — R51.9 ACUTE INTRACTABLE HEADACHE: Status: ACTIVE | Noted: 2020-11-11

## 2020-11-11 LAB
ABO + RH BLD: NORMAL
ALBUMIN SERPL BCP-MCNC: 3.2 G/DL (ref 3.5–5.2)
ALP SERPL-CCNC: 143 U/L (ref 55–135)
ALT SERPL W/O P-5'-P-CCNC: 47 U/L (ref 10–44)
ANION GAP SERPL CALC-SCNC: 11 MMOL/L (ref 8–16)
AST SERPL-CCNC: 49 U/L (ref 10–40)
BASOPHILS # BLD AUTO: 0.01 K/UL (ref 0–0.2)
BASOPHILS NFR BLD: 0.2 % (ref 0–1.9)
BILIRUB SERPL-MCNC: 0.3 MG/DL (ref 0.1–1)
BLD GP AB SCN CELLS X3 SERPL QL: NORMAL
BNP SERPL-MCNC: 38 PG/ML (ref 0–99)
BUN SERPL-MCNC: 11 MG/DL (ref 8–23)
CALCIUM SERPL-MCNC: 8.4 MG/DL (ref 8.7–10.5)
CHLORIDE SERPL-SCNC: 108 MMOL/L (ref 95–110)
CK SERPL-CCNC: 25 U/L (ref 20–180)
CO2 SERPL-SCNC: 25 MMOL/L (ref 23–29)
CREAT SERPL-MCNC: 0.7 MG/DL (ref 0.5–1.4)
CRP SERPL-MCNC: 26.4 MG/L (ref 0–8.2)
DIFFERENTIAL METHOD: ABNORMAL
EOSINOPHIL # BLD AUTO: 0 K/UL (ref 0–0.5)
EOSINOPHIL NFR BLD: 0 % (ref 0–8)
ERYTHROCYTE [DISTWIDTH] IN BLOOD BY AUTOMATED COUNT: 13.9 % (ref 11.5–14.5)
EST. GFR  (AFRICAN AMERICAN): >60 ML/MIN/1.73 M^2
EST. GFR  (NON AFRICAN AMERICAN): >60 ML/MIN/1.73 M^2
FERRITIN SERPL-MCNC: 458 NG/ML (ref 20–300)
GLUCOSE SERPL-MCNC: 102 MG/DL (ref 70–110)
HCT VFR BLD AUTO: 45.6 % (ref 37–48.5)
HGB BLD-MCNC: 14.7 G/DL (ref 12–16)
IMM GRANULOCYTES # BLD AUTO: 0.03 K/UL (ref 0–0.04)
IMM GRANULOCYTES NFR BLD AUTO: 0.5 % (ref 0–0.5)
LACTATE SERPL-SCNC: 1.2 MMOL/L (ref 0.5–2.2)
LDH SERPL L TO P-CCNC: 325 U/L (ref 110–260)
LYMPHOCYTES # BLD AUTO: 0.8 K/UL (ref 1–4.8)
LYMPHOCYTES NFR BLD: 14 % (ref 18–48)
MCH RBC QN AUTO: 30.4 PG (ref 27–31)
MCHC RBC AUTO-ENTMCNC: 32.2 G/DL (ref 32–36)
MCV RBC AUTO: 94 FL (ref 82–98)
MONOCYTES # BLD AUTO: 0.2 K/UL (ref 0.3–1)
MONOCYTES NFR BLD: 3.3 % (ref 4–15)
NEUTROPHILS # BLD AUTO: 4.5 K/UL (ref 1.8–7.7)
NEUTROPHILS NFR BLD: 82 % (ref 38–73)
NRBC BLD-RTO: 0 /100 WBC
PLATELET # BLD AUTO: 155 K/UL (ref 150–350)
PMV BLD AUTO: 9.7 FL (ref 9.2–12.9)
POTASSIUM SERPL-SCNC: 3.7 MMOL/L (ref 3.5–5.1)
PROCALCITONIN SERPL IA-MCNC: 0.04 NG/ML
PROT SERPL-MCNC: 6.6 G/DL (ref 6–8.4)
RBC # BLD AUTO: 4.83 M/UL (ref 4–5.4)
SODIUM SERPL-SCNC: 144 MMOL/L (ref 136–145)
TROPONIN I SERPL DL<=0.01 NG/ML-MCNC: 0.02 NG/ML (ref 0–0.03)
WBC # BLD AUTO: 5.51 K/UL (ref 3.9–12.7)

## 2020-11-11 PROCEDURE — 96372 THER/PROPH/DIAG INJ SC/IM: CPT | Mod: 59

## 2020-11-11 PROCEDURE — 83615 LACTATE (LD) (LDH) ENZYME: CPT

## 2020-11-11 PROCEDURE — 96361 HYDRATE IV INFUSION ADD-ON: CPT

## 2020-11-11 PROCEDURE — 93010 ELECTROCARDIOGRAM REPORT: CPT | Mod: ,,, | Performed by: SPECIALIST

## 2020-11-11 PROCEDURE — 87040 BLOOD CULTURE FOR BACTERIA: CPT | Mod: 59

## 2020-11-11 PROCEDURE — 63600175 PHARM REV CODE 636 W HCPCS: Performed by: EMERGENCY MEDICINE

## 2020-11-11 PROCEDURE — G0378 HOSPITAL OBSERVATION PER HR: HCPCS

## 2020-11-11 PROCEDURE — 27000221 HC OXYGEN, UP TO 24 HOURS

## 2020-11-11 PROCEDURE — 80053 COMPREHEN METABOLIC PANEL: CPT

## 2020-11-11 PROCEDURE — 93010 EKG 12-LEAD: ICD-10-PCS | Mod: ,,, | Performed by: SPECIALIST

## 2020-11-11 PROCEDURE — 99285 EMERGENCY DEPT VISIT HI MDM: CPT | Mod: 25

## 2020-11-11 PROCEDURE — 25000003 PHARM REV CODE 250: Performed by: INTERNAL MEDICINE

## 2020-11-11 PROCEDURE — 25000003 PHARM REV CODE 250: Performed by: EMERGENCY MEDICINE

## 2020-11-11 PROCEDURE — 84145 PROCALCITONIN (PCT): CPT

## 2020-11-11 PROCEDURE — 99204 OFFICE O/P NEW MOD 45 MIN: CPT | Mod: ,,, | Performed by: INTERNAL MEDICINE

## 2020-11-11 PROCEDURE — 63600175 PHARM REV CODE 636 W HCPCS: Performed by: INTERNAL MEDICINE

## 2020-11-11 PROCEDURE — 36415 COLL VENOUS BLD VENIPUNCTURE: CPT

## 2020-11-11 PROCEDURE — 99204 PR OFFICE/OUTPT VISIT, NEW, LEVL IV, 45-59 MIN: ICD-10-PCS | Mod: ,,, | Performed by: INTERNAL MEDICINE

## 2020-11-11 PROCEDURE — 83605 ASSAY OF LACTIC ACID: CPT

## 2020-11-11 PROCEDURE — 83880 ASSAY OF NATRIURETIC PEPTIDE: CPT

## 2020-11-11 PROCEDURE — 86901 BLOOD TYPING SEROLOGIC RH(D): CPT

## 2020-11-11 PROCEDURE — 63700000 PHARM REV CODE 250 ALT 637 W/O HCPCS: Performed by: INTERNAL MEDICINE

## 2020-11-11 PROCEDURE — 93005 ELECTROCARDIOGRAM TRACING: CPT

## 2020-11-11 PROCEDURE — 84484 ASSAY OF TROPONIN QUANT: CPT

## 2020-11-11 PROCEDURE — 82550 ASSAY OF CK (CPK): CPT

## 2020-11-11 PROCEDURE — 96374 THER/PROPH/DIAG INJ IV PUSH: CPT

## 2020-11-11 PROCEDURE — 86140 C-REACTIVE PROTEIN: CPT

## 2020-11-11 PROCEDURE — S5010 5% DEXTROSE AND 0.45% SALINE: HCPCS | Performed by: INTERNAL MEDICINE

## 2020-11-11 PROCEDURE — 85025 COMPLETE CBC W/AUTO DIFF WBC: CPT

## 2020-11-11 PROCEDURE — 82728 ASSAY OF FERRITIN: CPT

## 2020-11-11 PROCEDURE — 96375 TX/PRO/DX INJ NEW DRUG ADDON: CPT

## 2020-11-11 RX ORDER — LEVOTHYROXINE SODIUM 50 UG/1
50 TABLET ORAL DAILY
Status: DISCONTINUED | OUTPATIENT
Start: 2020-11-11 | End: 2020-11-17 | Stop reason: HOSPADM

## 2020-11-11 RX ORDER — GLUCAGON 1 MG
1 KIT INJECTION
Status: DISCONTINUED | OUTPATIENT
Start: 2020-11-11 | End: 2020-11-17 | Stop reason: HOSPADM

## 2020-11-11 RX ORDER — PANTOPRAZOLE SODIUM 40 MG/1
40 TABLET, DELAYED RELEASE ORAL DAILY
Status: DISCONTINUED | OUTPATIENT
Start: 2020-11-11 | End: 2020-11-17 | Stop reason: HOSPADM

## 2020-11-11 RX ORDER — LANOLIN ALCOHOL/MO/W.PET/CERES
800 CREAM (GRAM) TOPICAL
Status: DISCONTINUED | OUTPATIENT
Start: 2020-11-11 | End: 2020-11-17 | Stop reason: HOSPADM

## 2020-11-11 RX ORDER — EZETIMIBE 10 MG/1
10 TABLET ORAL DAILY
Status: DISCONTINUED | OUTPATIENT
Start: 2020-11-11 | End: 2020-11-17 | Stop reason: HOSPADM

## 2020-11-11 RX ORDER — ENOXAPARIN SODIUM 100 MG/ML
40 INJECTION SUBCUTANEOUS EVERY 24 HOURS
Status: DISCONTINUED | OUTPATIENT
Start: 2020-11-11 | End: 2020-11-17 | Stop reason: HOSPADM

## 2020-11-11 RX ORDER — ONDANSETRON 2 MG/ML
4 INJECTION INTRAMUSCULAR; INTRAVENOUS EVERY 8 HOURS PRN
Status: DISCONTINUED | OUTPATIENT
Start: 2020-11-11 | End: 2020-11-17 | Stop reason: HOSPADM

## 2020-11-11 RX ORDER — SODIUM CHLORIDE 0.9 % (FLUSH) 0.9 %
10 SYRINGE (ML) INJECTION
Status: DISCONTINUED | OUTPATIENT
Start: 2020-11-11 | End: 2020-11-17 | Stop reason: HOSPADM

## 2020-11-11 RX ORDER — AZITHROMYCIN 250 MG/1
500 TABLET, FILM COATED ORAL
Status: COMPLETED | OUTPATIENT
Start: 2020-11-11 | End: 2020-11-13

## 2020-11-11 RX ORDER — ONDANSETRON 2 MG/ML
8 INJECTION INTRAMUSCULAR; INTRAVENOUS
Status: COMPLETED | OUTPATIENT
Start: 2020-11-11 | End: 2020-11-11

## 2020-11-11 RX ORDER — HYDROCODONE BITARTRATE AND ACETAMINOPHEN 500; 5 MG/1; MG/1
TABLET ORAL
Status: DISCONTINUED | OUTPATIENT
Start: 2020-11-11 | End: 2020-11-17 | Stop reason: HOSPADM

## 2020-11-11 RX ORDER — POTASSIUM CHLORIDE 1.5 G/1.58G
60 POWDER, FOR SOLUTION ORAL
Status: DISCONTINUED | OUTPATIENT
Start: 2020-11-11 | End: 2020-11-17 | Stop reason: HOSPADM

## 2020-11-11 RX ORDER — POTASSIUM CHLORIDE 1.5 G/1.58G
40 POWDER, FOR SOLUTION ORAL
Status: DISCONTINUED | OUTPATIENT
Start: 2020-11-11 | End: 2020-11-17 | Stop reason: HOSPADM

## 2020-11-11 RX ORDER — IBUPROFEN 200 MG
16 TABLET ORAL
Status: DISCONTINUED | OUTPATIENT
Start: 2020-11-11 | End: 2020-11-17 | Stop reason: HOSPADM

## 2020-11-11 RX ORDER — SODIUM,POTASSIUM PHOSPHATES 280-250MG
2 POWDER IN PACKET (EA) ORAL
Status: DISCONTINUED | OUTPATIENT
Start: 2020-11-11 | End: 2020-11-17 | Stop reason: HOSPADM

## 2020-11-11 RX ORDER — ASCORBIC ACID 500 MG
500 TABLET ORAL 2 TIMES DAILY
Status: DISCONTINUED | OUTPATIENT
Start: 2020-11-11 | End: 2020-11-17 | Stop reason: HOSPADM

## 2020-11-11 RX ORDER — IBUPROFEN 200 MG
24 TABLET ORAL
Status: DISCONTINUED | OUTPATIENT
Start: 2020-11-11 | End: 2020-11-17 | Stop reason: HOSPADM

## 2020-11-11 RX ORDER — CHOLECALCIFEROL (VITAMIN D3) 25 MCG
1000 TABLET ORAL DAILY
Status: DISCONTINUED | OUTPATIENT
Start: 2020-11-11 | End: 2020-11-17 | Stop reason: HOSPADM

## 2020-11-11 RX ORDER — DEXTROSE MONOHYDRATE AND SODIUM CHLORIDE 5; .45 G/100ML; G/100ML
INJECTION, SOLUTION INTRAVENOUS CONTINUOUS
Status: ACTIVE | OUTPATIENT
Start: 2020-11-11 | End: 2020-11-12

## 2020-11-11 RX ORDER — HYDROCODONE BITARTRATE AND ACETAMINOPHEN 5; 325 MG/1; MG/1
1 TABLET ORAL EVERY 6 HOURS PRN
Status: DISCONTINUED | OUTPATIENT
Start: 2020-11-11 | End: 2020-11-17 | Stop reason: HOSPADM

## 2020-11-11 RX ORDER — ACETAMINOPHEN 325 MG/1
650 TABLET ORAL EVERY 8 HOURS PRN
Status: DISCONTINUED | OUTPATIENT
Start: 2020-11-11 | End: 2020-11-17 | Stop reason: HOSPADM

## 2020-11-11 RX ORDER — TALC
6 POWDER (GRAM) TOPICAL NIGHTLY PRN
Status: DISCONTINUED | OUTPATIENT
Start: 2020-11-11 | End: 2020-11-17 | Stop reason: HOSPADM

## 2020-11-11 RX ORDER — BENZONATATE 100 MG/1
100 CAPSULE ORAL 3 TIMES DAILY PRN
Status: DISCONTINUED | OUTPATIENT
Start: 2020-11-11 | End: 2020-11-17 | Stop reason: HOSPADM

## 2020-11-11 RX ADMIN — LEVOTHYROXINE SODIUM 50 MCG: 50 TABLET ORAL at 04:11

## 2020-11-11 RX ADMIN — ACETAMINOPHEN 650 MG: 325 TABLET ORAL at 04:11

## 2020-11-11 RX ADMIN — DEXTROSE AND SODIUM CHLORIDE: 5; .45 INJECTION, SOLUTION INTRAVENOUS at 07:11

## 2020-11-11 RX ADMIN — BENZONATATE 100 MG: 100 CAPSULE ORAL at 04:11

## 2020-11-11 RX ADMIN — AZITHROMYCIN MONOHYDRATE 500 MG: 250 TABLET ORAL at 04:11

## 2020-11-11 RX ADMIN — PROMETHAZINE HYDROCHLORIDE 12.5 MG: 25 INJECTION INTRAMUSCULAR; INTRAVENOUS at 02:11

## 2020-11-11 RX ADMIN — CEFTRIAXONE 1 G: 1 INJECTION, SOLUTION INTRAVENOUS at 04:11

## 2020-11-11 RX ADMIN — ONDANSETRON 8 MG: 2 INJECTION INTRAMUSCULAR; INTRAVENOUS at 10:11

## 2020-11-11 RX ADMIN — EZETIMIBE 10 MG: 10 TABLET ORAL at 04:11

## 2020-11-11 RX ADMIN — DEXAMETHASONE 6 MG: 4 TABLET ORAL at 04:11

## 2020-11-11 RX ADMIN — SODIUM CHLORIDE 1000 ML: 0.9 INJECTION, SOLUTION INTRAVENOUS at 10:11

## 2020-11-11 RX ADMIN — HYDROCODONE BITARTRATE AND ACETAMINOPHEN 1 TABLET: 5; 325 TABLET ORAL at 09:11

## 2020-11-11 RX ADMIN — CHOLECALCIFEROL (VITAMIN D3) 25 MCG (1,000 UNIT) TABLET 1000 UNITS: TABLET at 04:11

## 2020-11-11 RX ADMIN — THERA TABS 1 TABLET: TAB at 04:11

## 2020-11-11 RX ADMIN — ENOXAPARIN SODIUM 40 MG: 40 INJECTION SUBCUTANEOUS at 04:11

## 2020-11-11 RX ADMIN — PANTOPRAZOLE SODIUM 40 MG: 40 TABLET, DELAYED RELEASE ORAL at 04:11

## 2020-11-11 RX ADMIN — Medication 500 MG: at 08:11

## 2020-11-11 NOTE — ED PROVIDER NOTES
Encounter Date: 2020       History     Chief Complaint   Patient presents with    COVID-19 Concerns     Diagnosed 1 week ago; states sx are getting worse      Patient is a 65-year-old female with a past medical history of hypothyroidism reflux who presents the emergency room for evaluation of worsening COVID symptoms.  She was diagnosed a week ago but has been symptomatic for 10 days.  She states she is unable to eat and has been having vomiting and diarrhea.  Her shortness of breath got worse yesterday and she has some subtle central chest discomfort.  She has only been taking Tylenol and over-the-counter cough cold medicines.  She is not on home oxygen.  She does not know her home oxygen saturation.        Review of patient's allergies indicates:  No Known Allergies  Past Medical History:   Diagnosis Date    Arthralgia of multiple joints 10/1/2020    Diverticulosis of intestine 10/1/2020    Dyslipidemia 10/1/2020    GERD (gastroesophageal reflux disease)     Hypothyroidism      Past Surgical History:   Procedure Laterality Date    APPENDECTOMY      CARPAL TUNNEL RELEASE Right     COLONOSCOPY      EYE SURGERY      cataracts     Family History   Problem Relation Age of Onset    Arthritis Mother     Hearing loss Mother     Vision loss Mother     Arthritis Father     Colon cancer Paternal Grandmother         >80    Breast cancer Paternal Grandmother      Social History     Tobacco Use    Smoking status: Former Smoker     Types: Cigarettes     Quit date: 2012     Years since quittin.4    Smokeless tobacco: Never Used   Substance Use Topics    Alcohol use: No     Frequency: Never    Drug use: No     Review of Systems   Constitutional: Positive for appetite change and fatigue. Negative for diaphoresis and fever.   HENT: Negative for ear pain, rhinorrhea and sore throat.    Eyes: Negative for pain and visual disturbance.   Respiratory: Positive for cough and shortness of breath.     Cardiovascular: Negative for chest pain.   Gastrointestinal: Positive for diarrhea, nausea and vomiting. Negative for abdominal pain.   Genitourinary: Negative for difficulty urinating, dysuria and enuresis.   Musculoskeletal: Positive for arthralgias and myalgias.   Skin: Negative for rash.   Neurological: Positive for weakness (Generalized). Negative for numbness and headaches.   All other systems reviewed and are negative.      Physical Exam     Initial Vitals [11/11/20 0949]   BP Pulse Resp Temp SpO2   120/75 78 20 99 °F (37.2 °C) (!) 93 %      MAP       --         Physical Exam    Nursing note and vitals reviewed.  Constitutional: She appears well-developed and well-nourished.  Non-toxic appearance. No distress.   Patient appears fatigued.  She is lying in bed.  She does not appear to be in distress   HENT:   Head: Normocephalic and atraumatic.   Mouth/Throat: Oropharynx is clear and moist.   Eyes: Conjunctivae and EOM are normal. Pupils are equal, round, and reactive to light.   Neck: Neck supple.   Cardiovascular: Normal rate, regular rhythm, normal heart sounds and intact distal pulses. Exam reveals no gallop and no friction rub.    No murmur heard.  Pulmonary/Chest: No respiratory distress. She has no decreased breath sounds. She has no wheezes. She has no rhonchi. She has rales (Subtle).   Abdominal: Soft. Bowel sounds are normal. She exhibits no distension. There is abdominal tenderness ( mild diffuse).   Musculoskeletal: Normal range of motion. No edema.   Neurological: She is alert and oriented to person, place, and time. She has normal strength. No sensory deficit.   Skin: Skin is warm and dry.   Psychiatric: She has a normal mood and affect.         ED Course   Critical Care    Date/Time: 12/11/2020 9:43 PM  Performed by: Carlyle Acevedo MD  Authorized by: Carlyle Acevedo MD   Direct patient critical care time: 25 minutes  Additional history critical care time: 5 minutes  Ordering / reviewing  critical care time: 5 minutes  Documentation critical care time: 5 minutes  Consulting other physicians critical care time: 5 minutes  Consult with family critical care time: 5 minutes  Total critical care time (exclusive of procedural time) : 50 minutes  Critical care was necessary to treat or prevent imminent or life-threatening deterioration of the following conditions: respiratory failure.  Critical care was time spent personally by me on the following activities: discussions with consultants, discussions with primary provider, evaluation of patient's response to treatment, examination of patient, obtaining history from patient or surrogate, ordering and performing treatments and interventions, ordering and review of laboratory studies, ordering and review of radiographic studies, pulse oximetry and re-evaluation of patient's condition.        Labs Reviewed - No data to display       Imaging Results    None                            ED Course as of Nov 12 0623   Wed Nov 11, 2020   1109 EKG independently interpreted by me rate 86 normal sinus rate rhythm axis and intervals with no ST segment elevation or depression.    [JS]   1136 Labs do not show significant dehydration.  CRP slightly elevated.  Patient appears to have a hilar pneumonitis at this time but not bilateral.    [JS]   1237 Had a lengthy conversation with the patient and her .  They state while she is in the room her oxygen saturation was 88-90% prior to us putting her on 2 L here.  I did not see this, however it has with the patient states.  She states she feels weak and fatigued wants to be admitted.  I spoke with the hospitalist who agrees with the plan.    [JS]      ED Course User Index  [JS] Carlyle Acevedo MD            Clinical Impression:       ICD-10-CM ICD-9-CM   1. Pneumonia due to COVID-19 virus  U07.1 480.8    J12.89    2. Suspected COVID-19 virus infection  Z20.828 V01.79                                               Carlyle HAYDEN  MD [place]  11/12/20 0662       Carlyle Acevedo MD  12/11/20 [id number]

## 2020-11-11 NOTE — ED NOTES
Full report called to Floor.  Report given to Tyler.  No questions or concerns given at this time.

## 2020-11-11 NOTE — ASSESSMENT & PLAN NOTE
- COVID-19 testing   - Infection Control notified     - Isolation:   - Airborne, Contact and Droplet Precautions  - Cohort patients into COVID units  - N95 mask, wear eye protection  - 20 second hand hygiene              - Limit visitors per hospital policy              - Consolidating lab draws, nursing care, provider visits, and interventions    - Diagnostics: (leukopenia, hyponatremia, hyperferritinemia, elevated troponin, elevated d-dimer, age, and comorbidities are significant predictors of poor clinical outcome)  CBC, CMP, Procalcitonin, Ferritin, CRP and Portable CXR   - COVID-19 testing Collection Date:  - Infection Control notified      - Isolation:   - Airborne, Contact and Droplet Precautions  - Cohort patients into COVID units              - Limit visitors per hospital policy              - Consolidating lab draws, nursing care, provider visits, and interventions        - Management:  Supplemental O2 to maintain SpO2 >92%  Telemetry  Continuous/intermittent Pulse Ox  Albuterol treatment PRN   Consult pulmonary medicine.  Start dexamethasone 6 mg daily for 10 days  Consulting Remdesivir committee.  Decision regarding use of convalescent plasma will be deferred to pulmonologist.  Advance Care Planning          - Management:  Supplemental O2 to maintain SpO2 >92%  Telemetry  Continuous/intermittent Pulse Ox  Albuterol treatment PRN    Advance Care Planning     Code Status  Patient is full code

## 2020-11-11 NOTE — HPI
Mr. Frazier is 65 year old  female with past medical history significant for hypothyroidism, dyslipidemia, gastroesophageal reflux disease who presented to the ER with complaints of worsening shortness of breath.  Patient was diagnosed with COVID-19 infection a week ago.  States that her symptoms started worsening since yesterday.  Earlier she was doing well not having any symptoms.  She was taking Tylenol and over-the-counter cold medication.  Patient was not checking her home oxygen saturation.  She denies any other family member being sick.  Patient complains of some nausea, epigastric pain, diarrhea.  Denies any melena, hematemesis denies vomiting denies chest pain, palpitations.

## 2020-11-11 NOTE — ED NOTES
"Patient states SOB is better than when she arrived and also complains that she is still having "episodes" of nausea  "

## 2020-11-11 NOTE — CONSULTS
2020      Admit Date: 2020  Michelle Frazier  New Patient Consult     Chief Complaint   Patient presents with    COVID-19 Concerns     Diagnosed 1 week ago; states sx are getting worse        History of Present Illness:  Pt healthy, lives in [place] with .  Has good function, developed headaches and n/v/d last 10 days , takes in very little.  Voiding minimally.  Pt developed sob ppt er visit.  covid + on  .  Breathing well now on nc ox.  Headache severe, not eating.             From emergency room physicians history of present illness done -Patient is a 65-year-old female with a past medical history of hypothyroidism reflux who presents the emergency room for evaluation of worsening COVID symptoms.  She was diagnosed a week ago but has been symptomatic for 10 days.  She states she is unable to eat and has been having vomiting and diarrhea.  Her shortness of breath got worse yesterday and she has some subtle central chest discomfort.  She has only been taking Tylenol and over-the-counter cough cold medicines.  She is not on home oxygen.  She does not know her home oxygen saturation.    PFSH:  Past Medical History:   Diagnosis Date    Arthralgia of multiple joints 10/1/2020    Diverticulosis of intestine 10/1/2020    Dyslipidemia 10/1/2020    GERD (gastroesophageal reflux disease)     Hypothyroidism      Past Surgical History:   Procedure Laterality Date    APPENDECTOMY      CARPAL TUNNEL RELEASE Right     COLONOSCOPY      EYE SURGERY      cataracts     Social History     Tobacco Use    Smoking status: Former Smoker     Types: Cigarettes     Quit date: 2012     Years since quittin.4    Smokeless tobacco: Never Used   Substance Use Topics    Alcohol use: No     Frequency: Never    Drug use: No     Family History   Problem Relation Age of Onset    Arthritis Mother     Hearing loss Mother     Vision loss Mother     Arthritis Father     Colon cancer Paternal  "Grandmother         >80    Breast cancer Paternal Grandmother      Review of patient's allergies indicates:  No Known Allergies         Review of Systems:  a review of eleven systems covering constitutional, Psych, Eye, HEENT, Respiratory, Cardiac, GI, , Musculoskeletal, Endocrine, Dermatologicwas negative except the above mentioned abnormalities and for any pertinent findings as listed below: . pertinent positive as above, rest is good         Exam:Comprehensive exam done. /63   Pulse 79   Temp (!) 101.6 °F (38.7 °C)   Resp 18   Ht 5' 2" (1.575 m)   Wt 80.8 kg (178 lb 2.1 oz)   SpO2 (!) 93%   BMI 32.58 kg/m²   Exam included Vitals as listed, and patient's appearance and affect and alertness and mood, oral exam for yeast and hygiene and pharynx lesions and Mallapatti (M) score, neck with inspection for jvd and masses and thyroid abnormalities and lymph nodes (supraclavicular and infraclavicular nodes also examined and noted if abn), chest exam included symmetry and effort and fremitus and percussion and auscultation, cardiac exam included rhythm and gallops and murmur and rubs and jvd and edema, abdominal exam for mass and hepatosplenomegaly and tenderness and hernias and bowel sounds, Musculoskeletal exam with muscle tone and posture and mobility/gait and  strenght, and skin for rashes and cyanosis and pallor and turgor, extremity for clubbing.  Findings were normal except as listed below:  M3, post rales, chest is symmetric, no distress, normal percussion, normal fremitus and no clubbing, no edema, soft abd,       Radiographs reviewed: view by direct vision  cxr 11/11 with left lateral lung ggo/cov id infiltrate.    No results found for this or any previous visit.]    Labs     Recent Labs   Lab 11/11/20  1050   WBC 5.51   HGB 14.7   HCT 45.6        Recent Labs   Lab 11/11/20  1050 11/11/20  1630     --    K 3.7  --      --    CO2 25  --    BUN 11  --    CREATININE 0.7  --  "     --    CALCIUM 8.4*  --    AST 49*  --    ALT 47*  --    ALKPHOS 143*  --    BILITOT 0.3  --    PROT 6.6  --    ALBUMIN 3.2*  --    CRP 26.4*  --    PROCAL  --  0.04   LACTATE 1.2  --    TROPONINI 0.016  --    CPK 25  --    BNP  --  38   No results for input(s): PH, PCO2, PO2, HCO3 in the last 24 hours.  Microbiology Results (last 7 days)     Procedure Component Value Units Date/Time    Blood culture (site 1) [[id number]] Collected: 11/11/20 1630    Order Status: Sent Specimen: Blood from Antecubital, Right Arm Updated: 11/11/20 1631    Blood culture (site 2) [[id number]] Collected: 11/11/20 1630    Order Status: Sent Specimen: Blood from Peripheral, Left Hand Updated: 11/11/20 1631          Impression:  Active Hospital Problems    Diagnosis  POA    *Pneumonia due to COVID-19 virus [U07.1, J12.89]  Yes    Acute hypoxemic respiratory failure [J96.01]  Yes    Acute intractable headache [R51.9]  Yes    Dehydration [E86.0]  Yes    Dyslipidemia [E78.5]  Yes    Hypothyroidism [E03.9]  Yes    GERD (gastroesophageal reflux disease) [K21.9]  Yes      Resolved Hospital Problems   No resolved problems to display.               Plan:  November 11th temperature max 101.6, ferritin 458, creatinine 0.7, chest x-ray ground-glass opacity in the lateral left lung-right lung looks clear.    Will give couple liters d5 1/2.    norco for [facility] prn    Discussed remdesivir and plasma- kodi late in course but may have some benefit- pt wishes to receive.  sterroids ordered. Discussed bipap/vent if needed.     Monitoring and support.

## 2020-11-11 NOTE — SUBJECTIVE & OBJECTIVE
Past Medical History:   Diagnosis Date    Arthralgia of multiple joints 10/1/2020    Diverticulosis of intestine 10/1/2020    Dyslipidemia 10/1/2020    GERD (gastroesophageal reflux disease)     Hypothyroidism        Past Surgical History:   Procedure Laterality Date    APPENDECTOMY      CARPAL TUNNEL RELEASE Right     COLONOSCOPY      EYE SURGERY      cataracts       Review of patient's allergies indicates:  No Known Allergies    No current facility-administered medications on file prior to encounter.      Current Outpatient Medications on File Prior to Encounter   Medication Sig    cetirizine (ZYRTEC) 10 MG tablet Take 10 mg by mouth once daily.    fluticasone propionate (FLONASE) 50 mcg/actuation nasal spray 2 sprays (100 mcg total) by Each Nostril route daily as needed.    levothyroxine (SYNTHROID) 50 MCG tablet Take 1 tablet (50 mcg total) by mouth once daily.    pantoprazole (PROTONIX) 40 MG tablet Take 1 tablet (40 mg total) by mouth once daily.    diclofenac sodium (VOLTAREN) 1 % Gel Apply 2 g topically once daily.    ezetimibe (ZETIA) 10 mg tablet Take 1 tablet (10 mg total) by mouth once daily.     Family History     Problem Relation (Age of Onset)    Arthritis Mother, Father    Breast cancer Paternal Grandmother    Colon cancer Paternal Grandmother    Hearing loss Mother    Vision loss Mother        Tobacco Use    Smoking status: Former Smoker     Types: Cigarettes     Quit date: 2012     Years since quittin.4    Smokeless tobacco: Never Used   Substance and Sexual Activity    Alcohol use: No     Frequency: Never    Drug use: No    Sexual activity: Yes     Partners: Male     Birth control/protection: Post-menopausal     Comment:      Review of Systems   Constitutional: Positive for activity change, appetite change and fatigue. Negative for fever.   HENT: Negative for congestion and sore throat.    Eyes: Negative for discharge and visual disturbance.   Respiratory:  Positive for shortness of breath. Negative for cough, chest tightness and wheezing.    Cardiovascular: Negative for chest pain, palpitations and leg swelling.   Gastrointestinal: Positive for abdominal pain, diarrhea and nausea. Negative for abdominal distention and constipation.   Endocrine: Negative for cold intolerance and heat intolerance.   Genitourinary: Negative for dysuria, hematuria and urgency.   Musculoskeletal: Positive for myalgias. Negative for arthralgias and gait problem.   Skin: Negative for rash.   Allergic/Immunologic: Negative for immunocompromised state.   Neurological: Negative for dizziness and weakness.   Hematological: Does not bruise/bleed easily.   Psychiatric/Behavioral: The patient is not nervous/anxious.      Objective:     Vital Signs (Most Recent):  Temp: (!) 101.6 °F (38.7 °C) (11/11/20 1541)  Pulse: 79 (11/11/20 1541)  Resp: 18 (11/11/20 1541)  BP: 125/63 (11/11/20 1541)  SpO2: (!) 93 % (11/11/20 1541) Vital Signs (24h Range):  Temp:  [99 °F (37.2 °C)-101.6 °F (38.7 °C)] 101.6 °F (38.7 °C)  Pulse:  [76-81] 79  Resp:  [18-20] 18  SpO2:  [89 %-96 %] 93 %  BP: (115-125)/(63-75) 125/63     Weight: 80.8 kg (178 lb 2.1 oz)  Body mass index is 32.58 kg/m².    Physical Exam  Constitutional:       Appearance: Normal appearance.   HENT:      Head: Normocephalic and atraumatic.      Right Ear: External ear normal.      Left Ear: External ear normal.      Mouth/Throat:      Mouth: Mucous membranes are dry.   Eyes:      Extraocular Movements: Extraocular movements intact.      Pupils: Pupils are equal, round, and reactive to light.   Neck:      Musculoskeletal: Normal range of motion and neck supple.   Cardiovascular:      Rate and Rhythm: Normal rate and regular rhythm.      Pulses: Normal pulses.      Heart sounds: Normal heart sounds.   Pulmonary:      Effort: Pulmonary effort is normal.      Breath sounds: Normal breath sounds.   Abdominal:      General: Abdomen is flat. Bowel sounds are  normal.      Tenderness: There is abdominal tenderness.   Musculoskeletal: Normal range of motion.   Skin:     General: Skin is warm.      Capillary Refill: Capillary refill takes 2 to 3 seconds.   Neurological:      General: No focal deficit present.      Mental Status: She is alert.   Psychiatric:         Mood and Affect: Mood normal.           CRANIAL NERVES     CN III, IV, VI   Pupils are equal, round, and reactive to light.       Significant Labs:   BMP:   Recent Labs   Lab 11/11/20  1050         K 3.7      CO2 25   BUN 11   CREATININE 0.7   CALCIUM 8.4*     CBC:   Recent Labs   Lab 11/11/20  1050   WBC 5.51   HGB 14.7   HCT 45.6        CMP:   Recent Labs   Lab 11/11/20  1050      K 3.7      CO2 25      BUN 11   CREATININE 0.7   CALCIUM 8.4*   PROT 6.6   ALBUMIN 3.2*   BILITOT 0.3   ALKPHOS 143*   AST 49*   ALT 47*   ANIONGAP 11   EGFRNONAA >60       Significant Imaging: I have reviewed all pertinent imaging results/findings within the past 24 hours.

## 2020-11-11 NOTE — ASSESSMENT & PLAN NOTE
Patient has chronic hypothyroidism. TFTs reviewed-   Lab Results   Component Value Date    TSH 3.050 10/01/2020   . Will continue chronic levothyroxine and adjust for and clinical changes.

## 2020-11-11 NOTE — H&P
[facility] Hospital Medicine  History & Physical    Patient Name: Michelle Frazier  MRN: [id number]  Admission Date: 11/11/2020  Attending Physician: Emily Lima MD  Primary Care Provider: Matt Beltre Jr, MD         Patient information was obtained from patient and ER records.     Subjective:     Principal Problem:Pneumonia due to COVID-19 virus    Chief Complaint:   Chief Complaint   Patient presents with    COVID-19 Concerns     Diagnosed 1 week ago; states sx are getting worse         HPI: Mr. Frazier is 65 year old  female with past medical history significant for hypothyroidism, dyslipidemia, gastroesophageal reflux disease who presented to the ER with complaints of worsening shortness of breath.  Patient was diagnosed with COVID-19 infection a week ago.  States that her symptoms started worsening since yesterday.  Earlier she was doing well not having any symptoms.  She was taking Tylenol and over-the-counter cold medication.  Patient was not checking her home oxygen saturation.  She denies any other family member being sick.  Patient complains of some nausea, epigastric pain, diarrhea.  Denies any melena, hematemesis denies vomiting denies chest pain, palpitations.    Past Medical History:   Diagnosis Date    Arthralgia of multiple joints 10/1/2020    Diverticulosis of intestine 10/1/2020    Dyslipidemia 10/1/2020    GERD (gastroesophageal reflux disease)     Hypothyroidism        Past Surgical History:   Procedure Laterality Date    APPENDECTOMY      CARPAL TUNNEL RELEASE Right     COLONOSCOPY      EYE SURGERY      cataracts       Review of patient's allergies indicates:  No Known Allergies    No current facility-administered medications on file prior to encounter.      Current Outpatient Medications on File Prior to Encounter   Medication Sig    cetirizine (ZYRTEC) 10 MG tablet Take 10 mg by mouth once daily.    fluticasone propionate (FLONASE) 50 mcg/actuation nasal  spray 2 sprays (100 mcg total) by Each Nostril route daily as needed.    levothyroxine (SYNTHROID) 50 MCG tablet Take 1 tablet (50 mcg total) by mouth once daily.    pantoprazole (PROTONIX) 40 MG tablet Take 1 tablet (40 mg total) by mouth once daily.    diclofenac sodium (VOLTAREN) 1 % Gel Apply 2 g topically once daily.    ezetimibe (ZETIA) 10 mg tablet Take 1 tablet (10 mg total) by mouth once daily.     Family History     Problem Relation (Age of Onset)    Arthritis Mother, Father    Breast cancer Paternal Grandmother    Colon cancer Paternal Grandmother    Hearing loss Mother    Vision loss Mother        Tobacco Use    Smoking status: Former Smoker     Types: Cigarettes     Quit date: 2012     Years since quittin.4    Smokeless tobacco: Never Used   Substance and Sexual Activity    Alcohol use: No     Frequency: Never    Drug use: No    Sexual activity: Yes     Partners: Male     Birth control/protection: Post-menopausal     Comment:      Review of Systems   Constitutional: Positive for activity change, appetite change and fatigue. Negative for fever.   HENT: Negative for congestion and sore throat.    Eyes: Negative for discharge and visual disturbance.   Respiratory: Positive for shortness of breath. Negative for cough, chest tightness and wheezing.    Cardiovascular: Negative for chest pain, palpitations and leg swelling.   Gastrointestinal: Positive for abdominal pain, diarrhea and nausea. Negative for abdominal distention and constipation.   Endocrine: Negative for cold intolerance and heat intolerance.   Genitourinary: Negative for dysuria, hematuria and urgency.   Musculoskeletal: Positive for myalgias. Negative for arthralgias and gait problem.   Skin: Negative for rash.   Allergic/Immunologic: Negative for immunocompromised state.   Neurological: Negative for dizziness and weakness.   Hematological: Does not bruise/bleed easily.   Psychiatric/Behavioral: The patient is not  nervous/anxious.      Objective:     Vital Signs (Most Recent):  Temp: (!) 101.6 °F (38.7 °C) (11/11/20 1541)  Pulse: 79 (11/11/20 1541)  Resp: 18 (11/11/20 1541)  BP: 125/63 (11/11/20 1541)  SpO2: (!) 93 % (11/11/20 1541) Vital Signs (24h Range):  Temp:  [99 °F (37.2 °C)-101.6 °F (38.7 °C)] 101.6 °F (38.7 °C)  Pulse:  [76-81] 79  Resp:  [18-20] 18  SpO2:  [89 %-96 %] 93 %  BP: (115-125)/(63-75) 125/63     Weight: 80.8 kg (178 lb 2.1 oz)  Body mass index is 32.58 kg/m².    Physical Exam  Constitutional:       Appearance: Normal appearance.   HENT:      Head: Normocephalic and atraumatic.      Right Ear: External ear normal.      Left Ear: External ear normal.      Mouth/Throat:      Mouth: Mucous membranes are dry.   Eyes:      Extraocular Movements: Extraocular movements intact.      Pupils: Pupils are equal, round, and reactive to light.   Neck:      Musculoskeletal: Normal range of motion and neck supple.   Cardiovascular:      Rate and Rhythm: Normal rate and regular rhythm.      Pulses: Normal pulses.      Heart sounds: Normal heart sounds.   Pulmonary:      Effort: Pulmonary effort is normal.      Breath sounds: Normal breath sounds.   Abdominal:      General: Abdomen is flat. Bowel sounds are normal.      Tenderness: There is abdominal tenderness.   Musculoskeletal: Normal range of motion.   Skin:     General: Skin is warm.      Capillary Refill: Capillary refill takes 2 to 3 seconds.   Neurological:      General: No focal deficit present.      Mental Status: She is alert.   Psychiatric:         Mood and Affect: Mood normal.           CRANIAL NERVES     CN III, IV, VI   Pupils are equal, round, and reactive to light.       Significant Labs:   BMP:   Recent Labs   Lab 11/11/20  1050         K 3.7      CO2 25   BUN 11   CREATININE 0.7   CALCIUM 8.4*     CBC:   Recent Labs   Lab 11/11/20  1050   WBC 5.51   HGB 14.7   HCT 45.6        CMP:   Recent Labs   Lab 11/11/20  1050      K  3.7      CO2 25      BUN 11   CREATININE 0.7   CALCIUM 8.4*   PROT 6.6   ALBUMIN 3.2*   BILITOT 0.3   ALKPHOS 143*   AST 49*   ALT 47*   ANIONGAP 11   EGFRNONAA >60       Significant Imaging: I have reviewed all pertinent imaging results/findings within the past 24 hours.    Assessment/Plan:     * Pneumonia due to COVID-19 virus  - COVID-19 testing   - Infection Control notified     - Isolation:   - Airborne, Contact and Droplet Precautions  - Cohort patients into COVID units  - N95 mask, wear eye protection  - 20 second hand hygiene              - Limit visitors per hospital policy              - Consolidating lab draws, nursing care, provider visits, and interventions    - Diagnostics: (leukopenia, hyponatremia, hyperferritinemia, elevated troponin, elevated d-dimer, age, and comorbidities are significant predictors of poor clinical outcome)  CBC, CMP, Procalcitonin, Ferritin, CRP and Portable CXR   - COVID-19 testing Collection Date:  - Infection Control notified      - Isolation:   - Airborne, Contact and Droplet Precautions  - Cohort patients into COVID units              - Limit visitors per hospital policy              - Consolidating lab draws, nursing care, provider visits, and interventions        - Management:  Supplemental O2 to maintain SpO2 >92%  Telemetry  Continuous/intermittent Pulse Ox  Albuterol treatment PRN   Consult pulmonary medicine.  Start dexamethasone 6 mg daily for 10 days  Consulting Remdesivir committee.  Decision regarding use of convalescent plasma will be deferred to pulmonologist.  Advance Care Planning          - Management:  Supplemental O2 to maintain SpO2 >92%  Telemetry  Continuous/intermittent Pulse Ox  Albuterol treatment PRN    Advance Care Planning     Code Status  Patient is full code         GERD (gastroesophageal reflux disease)  Noted, chronic problem will continue pantoprazole      Hypothyroidism  Patient has chronic hypothyroidism. TFTs reviewed-   Lab  Results   Component Value Date    TSH 3.050 10/01/2020   . Will continue chronic levothyroxine and adjust for and clinical changes.        Dyslipidemia  Noted chronic problem patient is on ezetimibe will continue        VTE Risk Mitigation (From admission, onward)         Ordered     enoxaparin injection 40 mg  Every 24 hours      11/11/20 1519     IP VTE HIGH RISK PATIENT  Once      11/11/20 1519     Place sequential compression device  Until discontinued      11/11/20 1519                   Emily Lima MD  Department of Hospital Medicine   [facility]

## 2020-11-12 LAB
ALBUMIN SERPL BCP-MCNC: 2.7 G/DL (ref 3.5–5.2)
ALP SERPL-CCNC: 127 U/L (ref 55–135)
ALT SERPL W/O P-5'-P-CCNC: 39 U/L (ref 10–44)
ANION GAP SERPL CALC-SCNC: 10 MMOL/L (ref 8–16)
AST SERPL-CCNC: 38 U/L (ref 10–40)
BASOPHILS # BLD AUTO: 0.01 K/UL (ref 0–0.2)
BASOPHILS NFR BLD: 0.2 % (ref 0–1.9)
BILIRUB SERPL-MCNC: 0.2 MG/DL (ref 0.1–1)
BLD PROD TYP BPU: NORMAL
BLD PROD TYP BPU: NORMAL
BLOOD UNIT EXPIRATION DATE: NORMAL
BLOOD UNIT EXPIRATION DATE: NORMAL
BLOOD UNIT TYPE CODE: 600
BLOOD UNIT TYPE CODE: 6200
BLOOD UNIT TYPE: NORMAL
BLOOD UNIT TYPE: NORMAL
BUN SERPL-MCNC: 10 MG/DL (ref 8–23)
CALCIUM SERPL-MCNC: 8.1 MG/DL (ref 8.7–10.5)
CHLORIDE SERPL-SCNC: 110 MMOL/L (ref 95–110)
CO2 SERPL-SCNC: 22 MMOL/L (ref 23–29)
CODING SYSTEM: NORMAL
CODING SYSTEM: NORMAL
CREAT SERPL-MCNC: 0.7 MG/DL (ref 0.5–1.4)
DIFFERENTIAL METHOD: ABNORMAL
DISPENSE STATUS: NORMAL
DISPENSE STATUS: NORMAL
EOSINOPHIL # BLD AUTO: 0 K/UL (ref 0–0.5)
EOSINOPHIL NFR BLD: 0 % (ref 0–8)
ERYTHROCYTE [DISTWIDTH] IN BLOOD BY AUTOMATED COUNT: 14 % (ref 11.5–14.5)
EST. GFR  (AFRICAN AMERICAN): >60 ML/MIN/1.73 M^2
EST. GFR  (NON AFRICAN AMERICAN): >60 ML/MIN/1.73 M^2
GLUCOSE SERPL-MCNC: 161 MG/DL (ref 70–110)
HCT VFR BLD AUTO: 43 % (ref 37–48.5)
HGB BLD-MCNC: 13.6 G/DL (ref 12–16)
IMM GRANULOCYTES # BLD AUTO: 0.02 K/UL (ref 0–0.04)
IMM GRANULOCYTES NFR BLD AUTO: 0.4 % (ref 0–0.5)
LYMPHOCYTES # BLD AUTO: 0.8 K/UL (ref 1–4.8)
LYMPHOCYTES NFR BLD: 17.1 % (ref 18–48)
MAGNESIUM SERPL-MCNC: 2 MG/DL (ref 1.6–2.6)
MCH RBC QN AUTO: 30.1 PG (ref 27–31)
MCHC RBC AUTO-ENTMCNC: 31.6 G/DL (ref 32–36)
MCV RBC AUTO: 95 FL (ref 82–98)
MONOCYTES # BLD AUTO: 0.1 K/UL (ref 0.3–1)
MONOCYTES NFR BLD: 1.9 % (ref 4–15)
NEUTROPHILS # BLD AUTO: 3.9 K/UL (ref 1.8–7.7)
NEUTROPHILS NFR BLD: 80.4 % (ref 38–73)
NRBC BLD-RTO: 0 /100 WBC
PHOSPHATE SERPL-MCNC: 2.8 MG/DL (ref 2.7–4.5)
PLATELET # BLD AUTO: 164 K/UL (ref 150–350)
PMV BLD AUTO: 10.1 FL (ref 9.2–12.9)
POTASSIUM SERPL-SCNC: 3.9 MMOL/L (ref 3.5–5.1)
PROT SERPL-MCNC: 5.9 G/DL (ref 6–8.4)
RBC # BLD AUTO: 4.52 M/UL (ref 4–5.4)
SODIUM SERPL-SCNC: 142 MMOL/L (ref 136–145)
UNIT NUMBER: NORMAL
UNIT NUMBER: NORMAL
WBC # BLD AUTO: 4.86 K/UL (ref 3.9–12.7)

## 2020-11-12 PROCEDURE — G0378 HOSPITAL OBSERVATION PER HR: HCPCS

## 2020-11-12 PROCEDURE — 25000003 PHARM REV CODE 250: Performed by: INTERNAL MEDICINE

## 2020-11-12 PROCEDURE — 36430 TRANSFUSION BLD/BLD COMPNT: CPT

## 2020-11-12 PROCEDURE — P9017 PLASMA 1 DONOR FRZ W/IN 8 HR: HCPCS

## 2020-11-12 PROCEDURE — 84100 ASSAY OF PHOSPHORUS: CPT

## 2020-11-12 PROCEDURE — S5010 5% DEXTROSE AND 0.45% SALINE: HCPCS | Performed by: INTERNAL MEDICINE

## 2020-11-12 PROCEDURE — 94761 N-INVAS EAR/PLS OXIMETRY MLT: CPT

## 2020-11-12 PROCEDURE — 80053 COMPREHEN METABOLIC PANEL: CPT

## 2020-11-12 PROCEDURE — 83735 ASSAY OF MAGNESIUM: CPT

## 2020-11-12 PROCEDURE — 96372 THER/PROPH/DIAG INJ SC/IM: CPT | Mod: 59

## 2020-11-12 PROCEDURE — 63700000 PHARM REV CODE 250 ALT 637 W/O HCPCS: Performed by: INTERNAL MEDICINE

## 2020-11-12 PROCEDURE — 36415 COLL VENOUS BLD VENIPUNCTURE: CPT

## 2020-11-12 PROCEDURE — 96374 THER/PROPH/DIAG INJ IV PUSH: CPT | Mod: 59

## 2020-11-12 PROCEDURE — 63600175 PHARM REV CODE 636 W HCPCS: Performed by: INTERNAL MEDICINE

## 2020-11-12 PROCEDURE — 85025 COMPLETE CBC W/AUTO DIFF WBC: CPT

## 2020-11-12 PROCEDURE — 27000221 HC OXYGEN, UP TO 24 HOURS

## 2020-11-12 PROCEDURE — 96361 HYDRATE IV INFUSION ADD-ON: CPT

## 2020-11-12 RX ADMIN — HYDROCODONE BITARTRATE AND ACETAMINOPHEN 1 TABLET: 5; 325 TABLET ORAL at 05:11

## 2020-11-12 RX ADMIN — THERA TABS 1 TABLET: TAB at 09:11

## 2020-11-12 RX ADMIN — REMDESIVIR 200 MG: 100 INJECTION, POWDER, LYOPHILIZED, FOR SOLUTION INTRAVENOUS at 04:11

## 2020-11-12 RX ADMIN — CHOLECALCIFEROL (VITAMIN D3) 25 MCG (1,000 UNIT) TABLET 1000 UNITS: TABLET at 09:11

## 2020-11-12 RX ADMIN — DEXTROSE AND SODIUM CHLORIDE: 5; .45 INJECTION, SOLUTION INTRAVENOUS at 06:11

## 2020-11-12 RX ADMIN — LEVOTHYROXINE SODIUM 50 MCG: 50 TABLET ORAL at 09:11

## 2020-11-12 RX ADMIN — EZETIMIBE 10 MG: 10 TABLET ORAL at 09:11

## 2020-11-12 RX ADMIN — HYDROCODONE BITARTRATE AND ACETAMINOPHEN 1 TABLET: 5; 325 TABLET ORAL at 09:11

## 2020-11-12 RX ADMIN — Medication 500 MG: at 09:11

## 2020-11-12 RX ADMIN — PANTOPRAZOLE SODIUM 40 MG: 40 TABLET, DELAYED RELEASE ORAL at 09:11

## 2020-11-12 RX ADMIN — AZITHROMYCIN MONOHYDRATE 500 MG: 250 TABLET ORAL at 04:11

## 2020-11-12 RX ADMIN — ENOXAPARIN SODIUM 40 MG: 40 INJECTION SUBCUTANEOUS at 04:11

## 2020-11-12 RX ADMIN — DEXAMETHASONE 6 MG: 4 TABLET ORAL at 09:11

## 2020-11-12 NOTE — PROGRESS NOTES
[facility] Medicine  Progress Note    Patient Name: Michelle Frazier  MRN: [id number]  Patient Class: OP- Observation   Admission Date: 11/11/2020  Length of Stay: 0 days  Attending Physician: Emily Lima MD  Primary Care Provider: Matt Beltre Jr, MD        Subjective:     Principal Problem:Pneumonia due to COVID-19 virus        HPI:  Mr. Frazier is 65 year old  female with past medical history significant for hypothyroidism, dyslipidemia, gastroesophageal reflux disease who presented to the ER with complaints of worsening shortness of breath.  Patient was diagnosed with COVID-19 infection a week ago.  States that her symptoms started worsening since yesterday.  Earlier she was doing well not having any symptoms.  She was taking Tylenol and over-the-counter cold medication.  Patient was not checking her home oxygen saturation.  She denies any other family member being sick.  Patient complains of some nausea, epigastric pain, diarrhea.  Denies any melena, hematemesis denies vomiting denies chest pain, palpitations.    Overview/Hospital Course:  No notes on file    Interval History:  Patient is seen and examined today.  States her headache is getting little better however she continues to feel very fatigued malaise.  Nausea is little better she is able to tolerate some diet now.  Epigastric pain is improving.  Patient has been approved for Remdesevir treatment.  Patient get convalescent plasma as well today.  She is on Lovenox 40 mg.  Pulmonology is following she is currently on 3 L of oxygen.  Patient continues to have diarrhea    Review of Systems   Constitutional: Positive for activity change, appetite change and fatigue. Negative for fever.   HENT: Negative for congestion and sore throat.    Eyes: Negative for discharge and visual disturbance.   Respiratory: Positive for shortness of breath. Negative for cough, chest tightness and wheezing.    Cardiovascular: Negative for chest  pain, palpitations and leg swelling.   Gastrointestinal: Positive for abdominal pain, diarrhea and nausea. Negative for abdominal distention and constipation.   Endocrine: Negative for cold intolerance and heat intolerance.   Genitourinary: Negative for dysuria, hematuria and urgency.   Musculoskeletal: Positive for myalgias. Negative for arthralgias and gait problem.   Skin: Negative for rash.   Allergic/Immunologic: Negative for immunocompromised state.   Neurological: Positive for headaches. Negative for dizziness and weakness.   Hematological: Does not bruise/bleed easily.   Psychiatric/Behavioral: The patient is not nervous/anxious.      Objective:     Vital Signs (Most Recent):  Temp: 98.4 °F (36.9 °C) (11/12/20 1159)  Pulse: 70 (11/12/20 1159)  Resp: 18 (11/12/20 1159)  BP: (!) 99/55 (11/12/20 1159)  SpO2: (!) 90 % (11/12/20 1159) Vital Signs (24h Range):  Temp:  [96.5 °F (35.8 °C)-101.6 °F (38.7 °C)] 98.4 °F (36.9 °C)  Pulse:  [55-85] 70  Resp:  [18-20] 18  SpO2:  [90 %-96 %] 90 %  BP: ()/(55-73) 99/55     Weight: 80.8 kg (178 lb 2.1 oz)  Body mass index is 32.58 kg/m².    Intake/Output Summary (Last 24 hours) at 11/12/2020 1213  Last data filed at 11/12/2020 0610  Gross per 24 hour   Intake 1163.34 ml   Output --   Net 1163.34 ml      Physical Exam  Constitutional:       Appearance: Normal appearance.   HENT:      Head: Normocephalic and atraumatic.      Right Ear: External ear normal.      Left Ear: External ear normal.      Mouth/Throat:      Mouth: Mucous membranes are dry.   Eyes:      Extraocular Movements: Extraocular movements intact.      Pupils: Pupils are equal, round, and reactive to light.   Neck:      Musculoskeletal: Normal range of motion and neck supple.   Cardiovascular:      Rate and Rhythm: Normal rate and regular rhythm.      Pulses: Normal pulses.      Heart sounds: Normal heart sounds.   Pulmonary:      Effort: Pulmonary effort is normal.      Breath sounds: Normal breath sounds.    Abdominal:      General: Abdomen is flat. Bowel sounds are normal.      Tenderness: There is abdominal tenderness.   Musculoskeletal: Normal range of motion.   Skin:     General: Skin is warm.      Capillary Refill: Capillary refill takes 2 to 3 seconds.   Neurological:      General: No focal deficit present.      Mental Status: She is alert.   Psychiatric:         Mood and Affect: Mood normal.         Significant Labs:   BMP:   Recent Labs   Lab 11/12/20  0520   *      K 3.9      CO2 22*   BUN 10   CREATININE 0.7   CALCIUM 8.1*   MG 2.0       Significant Imaging: I have reviewed all pertinent imaging results/findings within the past 24 hours.      Assessment/Plan:      * Pneumonia due to COVID-19 virus  - COVID-19 testing   - Infection Control notified     - Isolation:   - Airborne, Contact and Droplet Precautions  - Cohort patients into COVID units  - N95 mask, wear eye protection  - 20 second hand hygiene              - Limit visitors per hospital policy              - Consolidating lab draws, nursing care, provider visits, and interventions    - Diagnostics: (leukopenia, hyponatremia, hyperferritinemia, elevated troponin, elevated d-dimer, age, and comorbidities are significant predictors of poor clinical outcome)  CBC, CMP, Procalcitonin, Ferritin, CRP and Portable CXR   - COVID-19 testing Collection Date:  - Infection Control notified      - Isolation:   - Airborne, Contact and Droplet Precautions  - Cohort patients into COVID units              - Limit visitors per hospital policy              - Consolidating lab draws, nursing care, provider visits, and interventions        - Management:  Supplemental O2 to maintain SpO2 >92%  Telemetry  Continuous/intermittent Pulse Ox  Albuterol treatment PRN   Consult pulmonary medicine.  Start dexamethasone 6 mg daily for 10 days  Consulting Remdesivir committee.  Decision regarding use of convalescent plasma will be deferred to  pulmonologist.  Advance Care Planning          - Management:  Supplemental O2 to maintain SpO2 >92%  Telemetry  Continuous/intermittent Pulse Ox  Albuterol treatment PRN    Advance Care Planning     Code Status  Patient is full code         Dehydration  Continue IV fluids and monitor BMP      Acute hypoxemic respiratory failure  Continue supplemental oxygen via nasal cannula pulmonology is following      GERD (gastroesophageal reflux disease)  Noted, chronic problem will continue pantoprazole      Hypothyroidism  Patient has chronic hypothyroidism. TFTs reviewed-   Lab Results   Component Value Date    TSH 3.050 10/01/2020   . Will continue chronic levothyroxine and adjust for and clinical changes.        Dyslipidemia  Noted chronic problem patient is on ezetimibe will continue        VTE Risk Mitigation (From admission, onward)         Ordered     enoxaparin injection 40 mg  Every 24 hours      11/11/20 1519     IP VTE HIGH RISK PATIENT  Once      11/11/20 1519     Place sequential compression device  Until discontinued      11/11/20 1519                Discharge Planning   RHIANNON:      Code Status: Full Code   Is the patient medically ready for discharge?:     Reason for patient still in hospital (select all that apply): Patient trending condition                     Emily Lima MD  Department of Hospital Medicine   [facility]

## 2020-11-12 NOTE — SUBJECTIVE & OBJECTIVE
Interval History:  Patient is seen and examined today.  States her headache is getting little better however she continues to feel very fatigued malaise.  Nausea is little better she is able to tolerate some diet now.  Epigastric pain is improving.  Patient has been approved for Remdesevir treatment.  Patient get convalescent plasma as well today.  She is on Lovenox 40 mg.  Pulmonology is following she is currently on 3 L of oxygen.  Patient continues to have diarrhea    Review of Systems   Constitutional: Positive for activity change, appetite change and fatigue. Negative for fever.   HENT: Negative for congestion and sore throat.    Eyes: Negative for discharge and visual disturbance.   Respiratory: Positive for shortness of breath. Negative for cough, chest tightness and wheezing.    Cardiovascular: Negative for chest pain, palpitations and leg swelling.   Gastrointestinal: Positive for abdominal pain, diarrhea and nausea. Negative for abdominal distention and constipation.   Endocrine: Negative for cold intolerance and heat intolerance.   Genitourinary: Negative for dysuria, hematuria and urgency.   Musculoskeletal: Positive for myalgias. Negative for arthralgias and gait problem.   Skin: Negative for rash.   Allergic/Immunologic: Negative for immunocompromised state.   Neurological: Positive for headaches. Negative for dizziness and weakness.   Hematological: Does not bruise/bleed easily.   Psychiatric/Behavioral: The patient is not nervous/anxious.      Objective:     Vital Signs (Most Recent):  Temp: 98.4 °F (36.9 °C) (11/12/20 1159)  Pulse: 70 (11/12/20 1159)  Resp: 18 (11/12/20 1159)  BP: (!) 99/55 (11/12/20 1159)  SpO2: (!) 90 % (11/12/20 1159) Vital Signs (24h Range):  Temp:  [96.5 °F (35.8 °C)-101.6 °F (38.7 °C)] 98.4 °F (36.9 °C)  Pulse:  [55-85] 70  Resp:  [18-20] 18  SpO2:  [90 %-96 %] 90 %  BP: ()/(55-73) 99/55     Weight: 80.8 kg (178 lb 2.1 oz)  Body mass index is 32.58  kg/m².    Intake/Output Summary (Last 24 hours) at 11/12/2020 1213  Last data filed at 11/12/2020 0610  Gross per 24 hour   Intake 1163.34 ml   Output --   Net 1163.34 ml      Physical Exam  Constitutional:       Appearance: Normal appearance.   HENT:      Head: Normocephalic and atraumatic.      Right Ear: External ear normal.      Left Ear: External ear normal.      Mouth/Throat:      Mouth: Mucous membranes are dry.   Eyes:      Extraocular Movements: Extraocular movements intact.      Pupils: Pupils are equal, round, and reactive to light.   Neck:      Musculoskeletal: Normal range of motion and neck supple.   Cardiovascular:      Rate and Rhythm: Normal rate and regular rhythm.      Pulses: Normal pulses.      Heart sounds: Normal heart sounds.   Pulmonary:      Effort: Pulmonary effort is normal.      Breath sounds: Normal breath sounds.   Abdominal:      General: Abdomen is flat. Bowel sounds are normal.      Tenderness: There is abdominal tenderness.   Musculoskeletal: Normal range of motion.   Skin:     General: Skin is warm.      Capillary Refill: Capillary refill takes 2 to 3 seconds.   Neurological:      General: No focal deficit present.      Mental Status: She is alert.   Psychiatric:         Mood and Affect: Mood normal.         Significant Labs:   BMP:   Recent Labs   Lab 11/12/20  0520   *      K 3.9      CO2 22*   BUN 10   CREATININE 0.7   CALCIUM 8.1*   MG 2.0       Significant Imaging: I have reviewed all pertinent imaging results/findings within the past 24 hours.

## 2020-11-12 NOTE — PLAN OF CARE
Due to pt being +COVID, CM unable to complete inpt discharge assessment. Pt attempted to reach pt by cell phone and hospital phone- no answer. CM called pt's spouse, Amari, to complete. Amari verified information on facesheet as correct. Denies POA/LW. Reports that pt lives at listed address with him. PCP is Dr. Beltre. Pharm is [facility] [address]. Denies hh/hd/dme. Reports that pt is independent with ADLs and able to drive herself to apts. Reports that he will provide transportation home upon DC.  Verified insurance as Medicare and [employer]. DC plan is home. CM following.        11/12/20 1255   Discharge Assessment   Assessment Type Discharge Planning Assessment   Confirmed/corrected address and phone number on facesheet? Yes   Assessment information obtained from? Other   Communicated expected length of stay with patient/caregiver yes   Prior to hospitilization cognitive status: Alert/Oriented   Prior to hospitalization functional status: Independent   Current cognitive status: Alert/Oriented   Current Functional Status: Independent   Lives With spouse   Able to Return to Prior Arrangements yes   Is patient able to care for self after discharge? Yes   Patient's perception of discharge disposition home or selfcare   Readmission Within the Last 30 Days no previous admission in last 30 days   Patient currently being followed by outpatient case management? No   Patient currently receives any other outside agency services? No   Equipment Currently Used at Home none   Do you have any problems affording any of your prescribed medications? No   Is the patient taking medications as prescribed? yes   Does the patient have transportation home? Yes   Transportation Anticipated family or friend will provide   Does the patient receive services at the [facility]? No   Discharge Plan A Home   Discharge Plan B Home with family   DME Needed Upon Discharge  none   Patient/Family in Agreement with Plan yes

## 2020-11-12 NOTE — NURSING
Type and screen completed, Plasma not in facility, charge nurse aware. Pt updated on plan of care, will continue to monitor.

## 2020-11-12 NOTE — PLAN OF CARE
11/12/20 1256   CHAIDEZ Message   Medicare Outpatient and Observation Notification regarding financial responsibility Given to patient/caregiver;Explained to patient/caregiver;Signed/date by patient/caregiver   Date CHAIDEZ was signed 11/12/20   Time CHAIDEZ was signed [id number]

## 2020-11-12 NOTE — PLAN OF CARE
Pt is calm and cooperative, pt on 2LPM via Nc, no sob present, iv access maintained, fall precautions maintained, po intake tolerated well, pain controlled with prn medication, no n/v or fever during shift, will continue to monitor.

## 2020-11-12 NOTE — UM SECONDARY REVIEW
Physician Advisor External    Level of Care Issue    Referral to EHR for DOS 11/11 & 11/12/2020    Per EHR review, OP for DOS 11/11 & 11/12/2020

## 2020-11-12 NOTE — PLAN OF CARE
POC reviewed with patient. Patient verbalizes understanding. Safety and tele maintained throughout shift. AAOX4 VSS. Pain controlled with PRN meds. Call light within reach. Will continue to monitor.

## 2020-11-12 NOTE — RESPIRATORY THERAPY
11/12/20 1105   Patient Assessment/Suction   Level of Consciousness (AVPU) alert   Respiratory Effort Normal;Unlabored   PRE-TX-O2   O2 Device (Oxygen Therapy) nasal cannula   $ Is the patient on Low Flow Oxygen? Yes   Flow (L/min) 3   SpO2 (!) 94 %   Pulse Oximetry Type Continuous   $ Pulse Oximetry - Multiple Charge Pulse Oximetry - Multiple

## 2020-11-13 LAB
ALBUMIN SERPL BCP-MCNC: 2.8 G/DL (ref 3.5–5.2)
ALP SERPL-CCNC: 124 U/L (ref 55–135)
ALT SERPL W/O P-5'-P-CCNC: 63 U/L (ref 10–44)
ANION GAP SERPL CALC-SCNC: 11 MMOL/L (ref 8–16)
AST SERPL-CCNC: 65 U/L (ref 10–40)
BASOPHILS # BLD AUTO: 0.01 K/UL (ref 0–0.2)
BASOPHILS NFR BLD: 0.1 % (ref 0–1.9)
BILIRUB SERPL-MCNC: 0.3 MG/DL (ref 0.1–1)
BUN SERPL-MCNC: 17 MG/DL (ref 8–23)
CALCIUM SERPL-MCNC: 8.5 MG/DL (ref 8.7–10.5)
CHLORIDE SERPL-SCNC: 108 MMOL/L (ref 95–110)
CO2 SERPL-SCNC: 26 MMOL/L (ref 23–29)
CREAT SERPL-MCNC: 0.7 MG/DL (ref 0.5–1.4)
D DIMER PPP IA.FEU-MCNC: 0.56 MG/L FEU
DIFFERENTIAL METHOD: ABNORMAL
EOSINOPHIL # BLD AUTO: 0 K/UL (ref 0–0.5)
EOSINOPHIL NFR BLD: 0 % (ref 0–8)
ERYTHROCYTE [DISTWIDTH] IN BLOOD BY AUTOMATED COUNT: 14.2 % (ref 11.5–14.5)
ERYTHROCYTE [SEDIMENTATION RATE] IN BLOOD BY WESTERGREN METHOD: 59 MM/HR (ref 0–20)
EST. GFR  (AFRICAN AMERICAN): >60 ML/MIN/1.73 M^2
EST. GFR  (NON AFRICAN AMERICAN): >60 ML/MIN/1.73 M^2
FERRITIN SERPL-MCNC: 539 NG/ML (ref 20–300)
GLUCOSE SERPL-MCNC: 122 MG/DL (ref 70–110)
HCT VFR BLD AUTO: 40.6 % (ref 37–48.5)
HGB BLD-MCNC: 13.3 G/DL (ref 12–16)
IMM GRANULOCYTES # BLD AUTO: 0.05 K/UL (ref 0–0.04)
IMM GRANULOCYTES NFR BLD AUTO: 0.5 % (ref 0–0.5)
LYMPHOCYTES # BLD AUTO: 1 K/UL (ref 1–4.8)
LYMPHOCYTES NFR BLD: 9.1 % (ref 18–48)
MAGNESIUM SERPL-MCNC: 2.1 MG/DL (ref 1.6–2.6)
MCH RBC QN AUTO: 30.6 PG (ref 27–31)
MCHC RBC AUTO-ENTMCNC: 32.8 G/DL (ref 32–36)
MCV RBC AUTO: 93 FL (ref 82–98)
MONOCYTES # BLD AUTO: 0.3 K/UL (ref 0.3–1)
MONOCYTES NFR BLD: 2.5 % (ref 4–15)
NEUTROPHILS # BLD AUTO: 9.6 K/UL (ref 1.8–7.7)
NEUTROPHILS NFR BLD: 87.8 % (ref 38–73)
NRBC BLD-RTO: 0 /100 WBC
PHOSPHATE SERPL-MCNC: 3 MG/DL (ref 2.7–4.5)
PLATELET # BLD AUTO: 218 K/UL (ref 150–350)
PMV BLD AUTO: 10.5 FL (ref 9.2–12.9)
POTASSIUM SERPL-SCNC: 4 MMOL/L (ref 3.5–5.1)
PROCALCITONIN SERPL IA-MCNC: 0.03 NG/ML
PROT SERPL-MCNC: 6.1 G/DL (ref 6–8.4)
RBC # BLD AUTO: 4.35 M/UL (ref 4–5.4)
SODIUM SERPL-SCNC: 145 MMOL/L (ref 136–145)
WBC # BLD AUTO: 10.93 K/UL (ref 3.9–12.7)

## 2020-11-13 PROCEDURE — 85651 RBC SED RATE NONAUTOMATED: CPT

## 2020-11-13 PROCEDURE — 85025 COMPLETE CBC W/AUTO DIFF WBC: CPT

## 2020-11-13 PROCEDURE — 25000003 PHARM REV CODE 250: Performed by: INTERNAL MEDICINE

## 2020-11-13 PROCEDURE — 82728 ASSAY OF FERRITIN: CPT

## 2020-11-13 PROCEDURE — 84100 ASSAY OF PHOSPHORUS: CPT

## 2020-11-13 PROCEDURE — 12000002 HC ACUTE/MED SURGE SEMI-PRIVATE ROOM

## 2020-11-13 PROCEDURE — 84145 PROCALCITONIN (PCT): CPT

## 2020-11-13 PROCEDURE — 94761 N-INVAS EAR/PLS OXIMETRY MLT: CPT

## 2020-11-13 PROCEDURE — 83735 ASSAY OF MAGNESIUM: CPT

## 2020-11-13 PROCEDURE — 85379 FIBRIN DEGRADATION QUANT: CPT

## 2020-11-13 PROCEDURE — 27000221 HC OXYGEN, UP TO 24 HOURS

## 2020-11-13 PROCEDURE — 80053 COMPREHEN METABOLIC PANEL: CPT

## 2020-11-13 PROCEDURE — 63600175 PHARM REV CODE 636 W HCPCS: Performed by: INTERNAL MEDICINE

## 2020-11-13 PROCEDURE — 63700000 PHARM REV CODE 250 ALT 637 W/O HCPCS: Performed by: INTERNAL MEDICINE

## 2020-11-13 PROCEDURE — 36415 COLL VENOUS BLD VENIPUNCTURE: CPT

## 2020-11-13 RX ADMIN — ENOXAPARIN SODIUM 40 MG: 40 INJECTION SUBCUTANEOUS at 04:11

## 2020-11-13 RX ADMIN — HYDROCODONE BITARTRATE AND ACETAMINOPHEN 1 TABLET: 5; 325 TABLET ORAL at 09:11

## 2020-11-13 RX ADMIN — THERA TABS 1 TABLET: TAB at 09:11

## 2020-11-13 RX ADMIN — Medication 500 MG: at 09:11

## 2020-11-13 RX ADMIN — PANTOPRAZOLE SODIUM 40 MG: 40 TABLET, DELAYED RELEASE ORAL at 09:11

## 2020-11-13 RX ADMIN — CHOLECALCIFEROL (VITAMIN D3) 25 MCG (1,000 UNIT) TABLET 1000 UNITS: TABLET at 09:11

## 2020-11-13 RX ADMIN — HYDROCODONE BITARTRATE AND ACETAMINOPHEN 1 TABLET: 5; 325 TABLET ORAL at 03:11

## 2020-11-13 RX ADMIN — LEVOTHYROXINE SODIUM 50 MCG: 50 TABLET ORAL at 09:11

## 2020-11-13 RX ADMIN — BENZONATATE 100 MG: 100 CAPSULE ORAL at 02:11

## 2020-11-13 RX ADMIN — REMDESIVIR 100 MG: 100 INJECTION, POWDER, LYOPHILIZED, FOR SOLUTION INTRAVENOUS at 02:11

## 2020-11-13 RX ADMIN — AZITHROMYCIN MONOHYDRATE 500 MG: 250 TABLET ORAL at 03:11

## 2020-11-13 RX ADMIN — EZETIMIBE 10 MG: 10 TABLET ORAL at 09:11

## 2020-11-13 RX ADMIN — DEXAMETHASONE 6 MG: 4 TABLET ORAL at 09:11

## 2020-11-13 RX ADMIN — Medication 500 MG: at 08:11

## 2020-11-13 NOTE — PROGRESS NOTES
[facility] Medicine  Progress Note    Patient Name: Michelle Frazier  MRN: [id number]  Patient Class: IP- Inpatient   Admission Date: 11/11/2020  Length of Stay: 0 days  Attending Physician: Emily Lima MD  Primary Care Provider: Matt Beltre Jr, MD        Subjective:     Principal Problem:Pneumonia due to COVID-19 virus        HPI:  Mr. Frazier is 65 year old  female with past medical history significant for hypothyroidism, dyslipidemia, gastroesophageal reflux disease who presented to the ER with complaints of worsening shortness of breath.  Patient was diagnosed with COVID-19 infection a week ago.  States that her symptoms started worsening since yesterday.  Earlier she was doing well not having any symptoms.  She was taking Tylenol and over-the-counter cold medication.  Patient was not checking her home oxygen saturation.  She denies any other family member being sick.  Patient complains of some nausea, epigastric pain, diarrhea.  Denies any melena, hematemesis denies vomiting denies chest pain, palpitations.    Overview/Hospital Course:  No notes on file    Interval History:  Patient is seen and examined today.  Patient continues to endorse shortness of breath especially on exertion.  Discussed with Dr. Fagan will continue with the same plan currently.  Patient is on Remdesevir treatment.  Her diarrhea slowly improved.  Epigastric pain is little better.  Patient currently using 3 L of oxygen via nasal cannula.   Review of Systems   Constitutional: Positive for activity change, appetite change and fatigue. Negative for fever.   HENT: Negative for congestion and sore throat.    Eyes: Negative for discharge and visual disturbance.   Respiratory: Positive for shortness of breath. Negative for cough, chest tightness and wheezing.    Cardiovascular: Negative for chest pain, palpitations and leg swelling.   Gastrointestinal: Positive for abdominal pain, diarrhea and nausea. Negative  for abdominal distention and constipation.   Endocrine: Negative for cold intolerance and heat intolerance.   Genitourinary: Negative for dysuria, hematuria and urgency.   Musculoskeletal: Positive for myalgias. Negative for arthralgias and gait problem.   Skin: Negative for rash.   Allergic/Immunologic: Negative for immunocompromised state.   Neurological: Positive for headaches. Negative for dizziness and weakness.   Hematological: Does not bruise/bleed easily.   Psychiatric/Behavioral: The patient is not nervous/anxious.      Objective:     Vital Signs (Most Recent):  Temp: 97.6 °F (36.4 °C) (11/13/20 0932)  Pulse: 69 (11/13/20 0932)  Resp: 19 (11/13/20 0932)  BP: 136/63 (11/13/20 0932)  SpO2: (!) 93 % (11/13/20 0932) Vital Signs (24h Range):  Temp:  [96.3 °F (35.7 °C)-98.2 °F (36.8 °C)] 97.6 °F (36.4 °C)  Pulse:  [52-80] 69  Resp:  [18-20] 19  SpO2:  [90 %-94 %] 93 %  BP: (102-136)/(51-65) 136/63     Weight: 80.8 kg (178 lb 2.1 oz)  Body mass index is 32.58 kg/m².    Intake/Output Summary (Last 24 hours) at 11/13/2020 1223  Last data filed at 11/12/2020 2000  Gross per 24 hour   Intake 840 ml   Output --   Net 840 ml      Physical Exam  Constitutional:       Appearance: Normal appearance.   HENT:      Head: Normocephalic and atraumatic.      Right Ear: External ear normal.      Left Ear: External ear normal.      Mouth/Throat:      Mouth: Mucous membranes are dry.   Eyes:      Extraocular Movements: Extraocular movements intact.      Pupils: Pupils are equal, round, and reactive to light.   Neck:      Musculoskeletal: Normal range of motion and neck supple.   Cardiovascular:      Rate and Rhythm: Normal rate and regular rhythm.      Pulses: Normal pulses.      Heart sounds: Normal heart sounds.   Pulmonary:      Effort: Pulmonary effort is normal.      Breath sounds: Normal breath sounds.   Abdominal:      General: Abdomen is flat. Bowel sounds are normal.      Tenderness: There is abdominal tenderness.    Musculoskeletal: Normal range of motion.   Skin:     General: Skin is warm.      Capillary Refill: Capillary refill takes 2 to 3 seconds.   Neurological:      General: No focal deficit present.      Mental Status: She is alert.   Psychiatric:         Mood and Affect: Mood normal.         Significant Labs:   BMP:   Recent Labs   Lab 20  0909   *      K 4.0      CO2 26   BUN 17   CREATININE 0.7   CALCIUM 8.5*   MG 2.1       Significant Imaging: I have reviewed all pertinent imaging results/findings within the past 24 hours.      Assessment/Plan:      * Pneumonia due to COVID-19 virus  - COVID-19 testin2000  - Infection Control notified     - Isolation:   - Airborne, Contact and Droplet Precautions  - Cohort patients into COVID units  - N95 mask, wear eye protection  - 20 second hand hygiene              - Limit visitors per hospital policy              - Consolidating lab draws, nursing care, provider visits, and interventions    - Diagnostics: (leukopenia, hyponatremia, hyperferritinemia, elevated troponin, elevated d-dimer, age, and comorbidities are significant predictors of poor clinical outcome)  CBC, CMP, Procalcitonin, Ferritin, CRP and Portable CXR   - COVID-19 testing Collection Date:  2020  - Infection Control notified      - Isolation:   - Airborne, Contact and Droplet Precautions  - Cohort patients into COVID units              - Limit visitors per hospital policy              - Consolidating lab draws, nursing care, provider visits, and interventions        - Management:  Supplemental O2 to maintain SpO2 >92%  Telemetry  Continuous/intermittent Pulse Ox  Albuterol treatment PRN   Pulmonology followwing  On dexamethasone 6 mg daily for 10 days  Consulting Remdesivir committee.  Decision regarding use of convalescent plasma will be deferred to pulmonologist.  Advance Care Planning          - Management:  Supplemental O2 to maintain SpO2  >92%  Telemetry  Continuous/intermittent Pulse Ox  Albuterol treatment PRN    Advance Care Planning     Code Status  Patient is full code         Dehydration  Patient started tolerating oral diet and monitor BMP      Acute hypoxemic respiratory failure  Continue supplemental oxygen via nasal cannula pulmonology is following  Patient currently on 4 LOf nasal cannula   will repeat procalcitonin level and chest X-ray      GERD (gastroesophageal reflux disease)  Noted, chronic problem will continue pantoprazole      Hypothyroidism  Patient has chronic hypothyroidism. TFTs reviewed-   Lab Results   Component Value Date    TSH 3.050 10/01/2020   . Will continue chronic levothyroxine and adjust for and clinical changes.        Dyslipidemia  Noted chronic problem patient is on ezetimibe will continue        VTE Risk Mitigation (From admission, onward)         Ordered     enoxaparin injection 40 mg  Every 24 hours      11/11/20 1519     IP VTE HIGH RISK PATIENT  Once      11/11/20 1519     Place sequential compression device  Until discontinued      11/11/20 1519                Discharge Planning   RHIANNON:      Code Status: Full Code   Is the patient medically ready for discharge?:     Reason for patient still in hospital (select all that apply): Patient trending condition  Discharge Plan A: Home                  Emily Lima MD  Department of Hospital Medicine   [facility]

## 2020-11-13 NOTE — PLAN OF CARE
Pt is calm and cooperative, pt agrees with plan of care, airborne precautions in place, tele with cont O2 in place, pt on 4L nc, no sob at rest, pt denies any n/v or headache, pt rested well, will continue to monitor.

## 2020-11-13 NOTE — PLAN OF CARE
11/12/20 1912   Patient Assessment/Suction   Level of Consciousness (AVPU) alert   Respiratory Effort Normal;Unlabored   Expansion/Accessory Muscles/Retractions no retractions;no use of accessory muscles   Rhythm/Pattern, Respiratory depth regular;pattern regular;unlabored   Cough Frequency no cough   PRE-TX-O2   O2 Device (Oxygen Therapy) nasal cannula   $ Is the patient on Low Flow Oxygen? Yes   Flow (L/min) 3   SpO2 (!) 90 %   Pulse Oximetry Type Continuous   $ Pulse Oximetry - Multiple Charge Pulse Oximetry - Multiple   Pulse 68   Resp 18

## 2020-11-13 NOTE — NURSING
Assumed pt care from leonard, auscultated coarse diminished breathe sounds with non-productive cough, pt is alert, will continue to monitor, observe and note any changes, safety maintain

## 2020-11-13 NOTE — PLAN OF CARE
Ongoing Covid precautions. 4L 02. Frequent BM's. Denies N/V. Does have DALEY. SCD's. 1-person assist to BSC. 2nd dose of Remdisevir today. Medicated for pain PRN. Purposeful rounding utilized to ensure pt needs met and safety maintained.

## 2020-11-13 NOTE — ASSESSMENT & PLAN NOTE
Continue supplemental oxygen via nasal cannula pulmonology is following  Patient currently on 4 LOf nasal cannula   will repeat procalcitonin level and chest X-ray

## 2020-11-13 NOTE — SUBJECTIVE & OBJECTIVE
Interval History:  Patient is seen and examined today.  Patient continues to endorse shortness of breath especially on exertion.  Discussed with Dr. Fagan will continue with the same plan currently.  Patient is on Remdesevir treatment.  Her diarrhea slowly improved.  Epigastric pain is little better.  Patient currently using 3 L of oxygen via nasal cannula.   Review of Systems   Constitutional: Positive for activity change, appetite change and fatigue. Negative for fever.   HENT: Negative for congestion and sore throat.    Eyes: Negative for discharge and visual disturbance.   Respiratory: Positive for shortness of breath. Negative for cough, chest tightness and wheezing.    Cardiovascular: Negative for chest pain, palpitations and leg swelling.   Gastrointestinal: Positive for abdominal pain, diarrhea and nausea. Negative for abdominal distention and constipation.   Endocrine: Negative for cold intolerance and heat intolerance.   Genitourinary: Negative for dysuria, hematuria and urgency.   Musculoskeletal: Positive for myalgias. Negative for arthralgias and gait problem.   Skin: Negative for rash.   Allergic/Immunologic: Negative for immunocompromised state.   Neurological: Positive for headaches. Negative for dizziness and weakness.   Hematological: Does not bruise/bleed easily.   Psychiatric/Behavioral: The patient is not nervous/anxious.      Objective:     Vital Signs (Most Recent):  Temp: 97.6 °F (36.4 °C) (11/13/20 0932)  Pulse: 69 (11/13/20 0932)  Resp: 19 (11/13/20 0932)  BP: 136/63 (11/13/20 0932)  SpO2: (!) 93 % (11/13/20 0932) Vital Signs (24h Range):  Temp:  [96.3 °F (35.7 °C)-98.2 °F (36.8 °C)] 97.6 °F (36.4 °C)  Pulse:  [52-80] 69  Resp:  [18-20] 19  SpO2:  [90 %-94 %] 93 %  BP: (102-136)/(51-65) 136/63     Weight: 80.8 kg (178 lb 2.1 oz)  Body mass index is 32.58 kg/m².    Intake/Output Summary (Last 24 hours) at 11/13/2020 1223  Last data filed at 11/12/2020 2000  Gross per 24 hour   Intake 840 ml    Output --   Net 840 ml      Physical Exam  Constitutional:       Appearance: Normal appearance.   HENT:      Head: Normocephalic and atraumatic.      Right Ear: External ear normal.      Left Ear: External ear normal.      Mouth/Throat:      Mouth: Mucous membranes are dry.   Eyes:      Extraocular Movements: Extraocular movements intact.      Pupils: Pupils are equal, round, and reactive to light.   Neck:      Musculoskeletal: Normal range of motion and neck supple.   Cardiovascular:      Rate and Rhythm: Normal rate and regular rhythm.      Pulses: Normal pulses.      Heart sounds: Normal heart sounds.   Pulmonary:      Effort: Pulmonary effort is normal.      Breath sounds: Normal breath sounds.   Abdominal:      General: Abdomen is flat. Bowel sounds are normal.      Tenderness: There is abdominal tenderness.   Musculoskeletal: Normal range of motion.   Skin:     General: Skin is warm.      Capillary Refill: Capillary refill takes 2 to 3 seconds.   Neurological:      General: No focal deficit present.      Mental Status: She is alert.   Psychiatric:         Mood and Affect: Mood normal.         Significant Labs:   BMP:   Recent Labs   Lab 11/13/20  0909   *      K 4.0      CO2 26   BUN 17   CREATININE 0.7   CALCIUM 8.5*   MG 2.1       Significant Imaging: I have reviewed all pertinent imaging results/findings within the past 24 hours.

## 2020-11-13 NOTE — ASSESSMENT & PLAN NOTE
- COVID-19 testin2000  - Infection Control notified     - Isolation:   - Airborne, Contact and Droplet Precautions  - Cohort patients into COVID units  - N95 mask, wear eye protection  - 20 second hand hygiene              - Limit visitors per hospital policy              - Consolidating lab draws, nursing care, provider visits, and interventions    - Diagnostics: (leukopenia, hyponatremia, hyperferritinemia, elevated troponin, elevated d-dimer, age, and comorbidities are significant predictors of poor clinical outcome)  CBC, CMP, Procalcitonin, Ferritin, CRP and Portable CXR   - COVID-19 testing Collection Date:  2020  - Infection Control notified      - Isolation:   - Airborne, Contact and Droplet Precautions  - Cohort patients into COVID units              - Limit visitors per hospital policy              - Consolidating lab draws, nursing care, provider visits, and interventions        - Management:  Supplemental O2 to maintain SpO2 >92%  Telemetry  Continuous/intermittent Pulse Ox  Albuterol treatment PRN   Pulmonology followwing  On dexamethasone 6 mg daily for 10 days  Consulting Remdesivir committee.  Decision regarding use of convalescent plasma will be deferred to pulmonologist.  Advance Care Planning          - Management:  Supplemental O2 to maintain SpO2 >92%  Telemetry  Continuous/intermittent Pulse Ox  Albuterol treatment PRN    Advance Care Planning     Code Status  Patient is full code

## 2020-11-14 LAB
ALBUMIN SERPL BCP-MCNC: 2.8 G/DL (ref 3.5–5.2)
ALP SERPL-CCNC: 128 U/L (ref 55–135)
ALT SERPL W/O P-5'-P-CCNC: 154 U/L (ref 10–44)
ANION GAP SERPL CALC-SCNC: 11 MMOL/L (ref 8–16)
AST SERPL-CCNC: 140 U/L (ref 10–40)
BASOPHILS # BLD AUTO: 0.01 K/UL (ref 0–0.2)
BASOPHILS NFR BLD: 0.1 % (ref 0–1.9)
BILIRUB SERPL-MCNC: 0.4 MG/DL (ref 0.1–1)
BUN SERPL-MCNC: 22 MG/DL (ref 8–23)
CALCIUM SERPL-MCNC: 8.6 MG/DL (ref 8.7–10.5)
CHLORIDE SERPL-SCNC: 109 MMOL/L (ref 95–110)
CO2 SERPL-SCNC: 24 MMOL/L (ref 23–29)
CREAT SERPL-MCNC: 0.7 MG/DL (ref 0.5–1.4)
DIFFERENTIAL METHOD: ABNORMAL
EOSINOPHIL # BLD AUTO: 0 K/UL (ref 0–0.5)
EOSINOPHIL NFR BLD: 0 % (ref 0–8)
ERYTHROCYTE [DISTWIDTH] IN BLOOD BY AUTOMATED COUNT: 14.2 % (ref 11.5–14.5)
EST. GFR  (AFRICAN AMERICAN): >60 ML/MIN/1.73 M^2
EST. GFR  (NON AFRICAN AMERICAN): >60 ML/MIN/1.73 M^2
GLUCOSE SERPL-MCNC: 121 MG/DL (ref 70–110)
HCT VFR BLD AUTO: 43.1 % (ref 37–48.5)
HGB BLD-MCNC: 14.1 G/DL (ref 12–16)
IMM GRANULOCYTES # BLD AUTO: 0.05 K/UL (ref 0–0.04)
IMM GRANULOCYTES NFR BLD AUTO: 0.7 % (ref 0–0.5)
LYMPHOCYTES # BLD AUTO: 1.2 K/UL (ref 1–4.8)
LYMPHOCYTES NFR BLD: 16.4 % (ref 18–48)
MAGNESIUM SERPL-MCNC: 2.3 MG/DL (ref 1.6–2.6)
MCH RBC QN AUTO: 30.3 PG (ref 27–31)
MCHC RBC AUTO-ENTMCNC: 32.7 G/DL (ref 32–36)
MCV RBC AUTO: 93 FL (ref 82–98)
MONOCYTES # BLD AUTO: 0.3 K/UL (ref 0.3–1)
MONOCYTES NFR BLD: 4.6 % (ref 4–15)
NEUTROPHILS # BLD AUTO: 5.6 K/UL (ref 1.8–7.7)
NEUTROPHILS NFR BLD: 78.2 % (ref 38–73)
NRBC BLD-RTO: 0 /100 WBC
PHOSPHATE SERPL-MCNC: 2.8 MG/DL (ref 2.7–4.5)
PLATELET # BLD AUTO: 243 K/UL (ref 150–350)
PMV BLD AUTO: 10.4 FL (ref 9.2–12.9)
POTASSIUM SERPL-SCNC: 4.2 MMOL/L (ref 3.5–5.1)
PROT SERPL-MCNC: 6.1 G/DL (ref 6–8.4)
RBC # BLD AUTO: 4.66 M/UL (ref 4–5.4)
SODIUM SERPL-SCNC: 144 MMOL/L (ref 136–145)
WBC # BLD AUTO: 7.19 K/UL (ref 3.9–12.7)

## 2020-11-14 PROCEDURE — 12000002 HC ACUTE/MED SURGE SEMI-PRIVATE ROOM

## 2020-11-14 PROCEDURE — 25000003 PHARM REV CODE 250: Performed by: INTERNAL MEDICINE

## 2020-11-14 PROCEDURE — 84100 ASSAY OF PHOSPHORUS: CPT

## 2020-11-14 PROCEDURE — 94761 N-INVAS EAR/PLS OXIMETRY MLT: CPT

## 2020-11-14 PROCEDURE — 83735 ASSAY OF MAGNESIUM: CPT

## 2020-11-14 PROCEDURE — 36415 COLL VENOUS BLD VENIPUNCTURE: CPT

## 2020-11-14 PROCEDURE — 80053 COMPREHEN METABOLIC PANEL: CPT

## 2020-11-14 PROCEDURE — 27000221 HC OXYGEN, UP TO 24 HOURS

## 2020-11-14 PROCEDURE — 63600175 PHARM REV CODE 636 W HCPCS: Performed by: INTERNAL MEDICINE

## 2020-11-14 PROCEDURE — 94799 UNLISTED PULMONARY SVC/PX: CPT

## 2020-11-14 PROCEDURE — 85025 COMPLETE CBC W/AUTO DIFF WBC: CPT

## 2020-11-14 RX ORDER — GUAIFENESIN 100 MG/5ML
200 SOLUTION ORAL EVERY 4 HOURS PRN
Status: DISCONTINUED | OUTPATIENT
Start: 2020-11-14 | End: 2020-11-17 | Stop reason: HOSPADM

## 2020-11-14 RX ORDER — L. ACIDOPHILUS/L.BULGARICUS 100MM CELL
1 GRANULES IN PACKET (EA) ORAL 2 TIMES DAILY
Status: DISCONTINUED | OUTPATIENT
Start: 2020-11-14 | End: 2020-11-17 | Stop reason: HOSPADM

## 2020-11-14 RX ADMIN — Medication 500 MG: at 08:11

## 2020-11-14 RX ADMIN — REMDESIVIR 100 MG: 100 INJECTION, POWDER, LYOPHILIZED, FOR SOLUTION INTRAVENOUS at 04:11

## 2020-11-14 RX ADMIN — CHOLECALCIFEROL (VITAMIN D3) 25 MCG (1,000 UNIT) TABLET 1000 UNITS: TABLET at 09:11

## 2020-11-14 RX ADMIN — Medication 500 MG: at 09:11

## 2020-11-14 RX ADMIN — LEVOTHYROXINE SODIUM 50 MCG: 50 TABLET ORAL at 09:11

## 2020-11-14 RX ADMIN — HYDROCODONE BITARTRATE AND ACETAMINOPHEN 1 TABLET: 5; 325 TABLET ORAL at 05:11

## 2020-11-14 RX ADMIN — LACTOBACILLUS ACIDOPHILUS / LACTOBACILLUS BULGARICUS 1 EACH: 100 MILLION CFU STRENGTH GRANULES at 08:11

## 2020-11-14 RX ADMIN — DEXAMETHASONE 6 MG: 4 TABLET ORAL at 09:11

## 2020-11-14 RX ADMIN — PANTOPRAZOLE SODIUM 40 MG: 40 TABLET, DELAYED RELEASE ORAL at 09:11

## 2020-11-14 RX ADMIN — HYDROCODONE BITARTRATE AND ACETAMINOPHEN 1 TABLET: 5; 325 TABLET ORAL at 09:11

## 2020-11-14 RX ADMIN — THERA TABS 1 TABLET: TAB at 09:11

## 2020-11-14 RX ADMIN — ENOXAPARIN SODIUM 40 MG: 40 INJECTION SUBCUTANEOUS at 04:11

## 2020-11-14 RX ADMIN — EZETIMIBE 10 MG: 10 TABLET ORAL at 09:11

## 2020-11-14 NOTE — SUBJECTIVE & OBJECTIVE
Interval History:    11/14 - presently on 4 liter/minute supplemental oxygen via nasal cannula.  Patient is feeling somewhat better.  No chest pain reported.  Receiving intravenous REM does severe.  Patient received convalescent plasma on November 11, 2020.  Patient is afebrile.  Reports dyspnea on exertion and generalized weakness.  Appetite is on lower side.    Review of Systems   Constitutional: Positive for activity change, appetite change and fatigue. Negative for fever.   HENT: Negative for congestion and sore throat.    Eyes: Negative for discharge and visual disturbance.   Respiratory: Positive for shortness of breath. Negative for cough, chest tightness and wheezing.    Cardiovascular: Negative for chest pain, palpitations and leg swelling.   Gastrointestinal: Positive for abdominal pain, diarrhea and nausea. Negative for abdominal distention and constipation.   Endocrine: Negative for cold intolerance and heat intolerance.   Genitourinary: Negative for dysuria, hematuria and urgency.   Musculoskeletal: Positive for myalgias. Negative for arthralgias and gait problem.   Skin: Negative for rash.   Allergic/Immunologic: Negative for immunocompromised state.   Neurological: Positive for headaches. Negative for dizziness and weakness.   Hematological: Does not bruise/bleed easily.   Psychiatric/Behavioral: The patient is not nervous/anxious.      Objective:     Vital Signs (Most Recent):  Temp: 97 °F (36.1 °C) (11/14/20 0834)  Pulse: 62 (11/14/20 1252)  Resp: 18 (11/14/20 1252)  BP: (!) 141/73 (11/14/20 0834)  SpO2: 95 % (11/14/20 1252) Vital Signs (24h Range):  Temp:  [97 °F (36.1 °C)-98.1 °F (36.7 °C)] 97 °F (36.1 °C)  Pulse:  [62-69] 62  Resp:  [14-20] 18  SpO2:  [92 %-95 %] 95 %  BP: (120-143)/(67-73) 141/73     Weight: 80.8 kg (178 lb 2.1 oz)  Body mass index is 32.58 kg/m².    Intake/Output Summary (Last 24 hours) at 11/14/2020 2409  Last data filed at 11/13/2020 1800  Gross per 24 hour   Intake 490 ml    Output --   Net 490 ml      Physical Exam  Constitutional:       Appearance: Normal appearance.   HENT:      Head: Normocephalic and atraumatic.      Right Ear: External ear normal.      Left Ear: External ear normal.      Mouth/Throat:      Mouth: Mucous membranes are dry.   Eyes:      Extraocular Movements: Extraocular movements intact.      Pupils: Pupils are equal, round, and reactive to light.   Neck:      Musculoskeletal: Normal range of motion and neck supple.   Cardiovascular:      Rate and Rhythm: Normal rate and regular rhythm.      Pulses: Normal pulses.      Heart sounds: Normal heart sounds.   Pulmonary:      Effort: Pulmonary effort is normal.      Breath sounds: Normal breath sounds.   Abdominal:      General: Abdomen is flat. Bowel sounds are normal.      Tenderness: There is abdominal tenderness.   Musculoskeletal: Normal range of motion.   Skin:     General: Skin is warm.      Capillary Refill: Capillary refill takes 2 to 3 seconds.   Neurological:      General: No focal deficit present.      Mental Status: She is alert.   Psychiatric:         Mood and Affect: Mood normal.         Significant Labs:   BMP:   Recent Labs   Lab 11/14/20  0648   *      K 4.2      CO2 24   BUN 22   CREATININE 0.7   CALCIUM 8.6*   MG 2.3     Microbiology Results (last 7 days)     Procedure Component Value Units Date/Time    Clostridium difficile EIA [[id number]]     Order Status: No result Specimen: Stool     Blood culture (site 1) [[id number]] Collected: 11/11/20 1630    Order Status: Completed Specimen: Blood from Antecubital, Right Arm Updated: 11/14/20 0612     Blood Culture, Routine No Growth to date      No Growth to date      No Growth to date    Narrative:      Site # 1, aerobic and anaerobic    Blood culture (site 2) [[id number]] Collected: 11/11/20 1630    Order Status: Completed Specimen: Blood from Peripheral, Left Hand Updated: 11/14/20 0612     Blood Culture, Routine No Growth to date       No Growth to date      No Growth to date    Narrative:      Site # 2, aerobic only        Significant Imaging:  CXR: A peripheral interstitial infiltrate is identified in the left lung but not in the right lung.  This is an unusual pattern but viral pneumonitis is still suspected    CXR; There is bibasilar atelectasis versus infiltrate new from prior exam.

## 2020-11-14 NOTE — PLAN OF CARE
11/13/20 1915   Patient Assessment/Suction   Level of Consciousness (AVPU) alert   Respiratory Effort Unlabored   Expansion/Accessory Muscles/Retractions no use of accessory muscles   PRE-TX-O2   O2 Device (Oxygen Therapy) nasal cannula   $ Is the patient on Low Flow Oxygen? Yes   Flow (L/min) 4   Oxygen Concentration (%) 36   SpO2 (!) 93 %   Pulse Oximetry Type Continuous   $ Pulse Oximetry - Multiple Charge Pulse Oximetry - Multiple   Pulse 62   Resp 18   Ready to Wean/Extubation Screen   FIO2<=50 (chart decimal) 0.36

## 2020-11-14 NOTE — PROGRESS NOTES
[facility] Hospital Medicine  Progress Note    Patient Name: Michelle Frazier  MRN: [id number]  Patient Class: IP- Inpatient   Admission Date: 11/11/2020  Length of Stay: 1 days  Attending Physician: Praveen Motta MD  Primary Care Provider: Matt Beltre Jr, MD        Subjective:     Principal Problem:Pneumonia due to COVID-19 virus        HPI:  Mr. Frazier is 65 year old  female with past medical history significant for hypothyroidism, dyslipidemia, gastroesophageal reflux disease who presented to the ER with complaints of worsening shortness of breath.  Patient was diagnosed with COVID-19 infection a week ago.  States that her symptoms started worsening since yesterday.  Earlier she was doing well not having any symptoms.  She was taking Tylenol and over-the-counter cold medication.  Patient was not checking her home oxygen saturation.  She denies any other family member being sick.  Patient complains of some nausea, epigastric pain, diarrhea.  Denies any melena, hematemesis denies vomiting denies chest pain, palpitations.    Overview/Hospital Course:  11/14 - presently on 4 liter/minute supplemental oxygen via nasal cannula.  Patient is feeling somewhat better.  No chest pain reported.  Receiving intravenous REM does severe.  Patient received convalescent plasma on November 11, 2020.  Patient is afebrile.  Reports dyspnea on exertion and generalized weakness.  Appetite is on lower side.    Interval History:    11/14 - presently on 4 liter/minute supplemental oxygen via nasal cannula.  Patient is feeling somewhat better.  No chest pain reported.  Receiving intravenous REM does severe.  Patient received convalescent plasma on November 11, 2020.  Patient is afebrile.  Reports dyspnea on exertion and generalized weakness.  Appetite is on lower side.    Review of Systems   Constitutional: Positive for activity change, appetite change and fatigue. Negative for fever.   HENT: Negative for  congestion and sore throat.    Eyes: Negative for discharge and visual disturbance.   Respiratory: Positive for shortness of breath. Negative for cough, chest tightness and wheezing.    Cardiovascular: Negative for chest pain, palpitations and leg swelling.   Gastrointestinal: Positive for abdominal pain, diarrhea and nausea. Negative for abdominal distention and constipation.   Endocrine: Negative for cold intolerance and heat intolerance.   Genitourinary: Negative for dysuria, hematuria and urgency.   Musculoskeletal: Positive for myalgias. Negative for arthralgias and gait problem.   Skin: Negative for rash.   Allergic/Immunologic: Negative for immunocompromised state.   Neurological: Positive for headaches. Negative for dizziness and weakness.   Hematological: Does not bruise/bleed easily.   Psychiatric/Behavioral: The patient is not nervous/anxious.      Objective:     Vital Signs (Most Recent):  Temp: 97 °F (36.1 °C) (11/14/20 0834)  Pulse: 62 (11/14/20 1252)  Resp: 18 (11/14/20 1252)  BP: (!) 141/73 (11/14/20 0834)  SpO2: 95 % (11/14/20 1252) Vital Signs (24h Range):  Temp:  [97 °F (36.1 °C)-98.1 °F (36.7 °C)] 97 °F (36.1 °C)  Pulse:  [62-69] 62  Resp:  [14-20] 18  SpO2:  [92 %-95 %] 95 %  BP: (120-143)/(67-73) 141/73     Weight: 80.8 kg (178 lb 2.1 oz)  Body mass index is 32.58 kg/m².    Intake/Output Summary (Last 24 hours) at 11/14/2020 1629  Last data filed at 11/13/2020 1800  Gross per 24 hour   Intake 490 ml   Output --   Net 490 ml      Physical Exam  Constitutional:       Appearance: Normal appearance.   HENT:      Head: Normocephalic and atraumatic.      Right Ear: External ear normal.      Left Ear: External ear normal.      Mouth/Throat:      Mouth: Mucous membranes are dry.   Eyes:      Extraocular Movements: Extraocular movements intact.      Pupils: Pupils are equal, round, and reactive to light.   Neck:      Musculoskeletal: Normal range of motion and neck supple.   Cardiovascular:      Rate  and Rhythm: Normal rate and regular rhythm.      Pulses: Normal pulses.      Heart sounds: Normal heart sounds.   Pulmonary:      Effort: Pulmonary effort is normal.      Breath sounds: Normal breath sounds.   Abdominal:      General: Abdomen is flat. Bowel sounds are normal.      Tenderness: There is abdominal tenderness.   Musculoskeletal: Normal range of motion.   Skin:     General: Skin is warm.      Capillary Refill: Capillary refill takes 2 to 3 seconds.   Neurological:      General: No focal deficit present.      Mental Status: She is alert.   Psychiatric:         Mood and Affect: Mood normal.         Significant Labs:   BMP:   Recent Labs   Lab 20   *      K 4.2      CO2 24   BUN 22   CREATININE 0.7   CALCIUM 8.6*   MG 2.3     Microbiology Results (last 7 days)     Procedure Component Value Units Date/Time    Clostridium difficile EIA [[id number]]     Order Status: No result Specimen: Stool     Blood culture (site 1) [[id number]] Collected: 20    Order Status: Completed Specimen: Blood from Antecubital, Right Arm Updated: 20     Blood Culture, Routine No Growth to date      No Growth to date      No Growth to date    Narrative:      Site # 1, aerobic and anaerobic    Blood culture (site 2) [[id number]] Collected: 20    Order Status: Completed Specimen: Blood from Peripheral, Left Hand Updated: 20     Blood Culture, Routine No Growth to date      No Growth to date      No Growth to date    Narrative:      Site # 2, aerobic only        Significant Imaging:  CXR: A peripheral interstitial infiltrate is identified in the left lung but not in the right lung.  This is an unusual pattern but viral pneumonitis is still suspected    CXR; There is bibasilar atelectasis versus infiltrate new from prior exam.      Assessment/Plan:      * Pneumonia due to COVID-19 virus  - COVID-19 testin2000  - Infection Control notified     - Isolation:    - Airborne, Contact and Droplet Precautions  - Cohort patients into COVID units  - N95 mask, wear eye protection  - 20 second hand hygiene              - Limit visitors per hospital policy              - Consolidating lab draws, nursing care, provider visits, and interventions    - Diagnostics: (leukopenia, hyponatremia, hyperferritinemia, elevated troponin, elevated d-dimer, age, and comorbidities are significant predictors of poor clinical outcome)  CBC, CMP, Procalcitonin, Ferritin, CRP and Portable CXR   - COVID-19 testing Collection Date:  11/04/2020  - Infection Control notified      - Isolation:   - Airborne, Contact and Droplet Precautions  - Cohort patients into COVID units              - Limit visitors per hospital policy              - Consolidating lab draws, nursing care, provider visits, and interventions        - Management:  Supplemental O2 to maintain SpO2 >92%  Telemetry  Continuous/intermittent Pulse Ox  Albuterol treatment PRN   Pulmonology followwing  On dexamethasone 6 mg daily for 10 days  Receiving intravenous Remdesivir.   Status post convalescent plasma transfusion November 11, 2020.  Advance Care Planning          - Management:  Supplemental O2 to maintain SpO2 >92%  Telemetry  Continuous/intermittent Pulse Ox  Albuterol treatment PRN    Advance Care Planning     Code Status  Patient is full code         Dehydration  Patient started tolerating oral diet and monitor BMP      Acute hypoxemic respiratory failure  Continue supplemental oxygen via nasal cannula pulmonology is following  Patient currently on 4 LOf nasal cannula      GERD (gastroesophageal reflux disease)  Noted, chronic problem will continue pantoprazole      Hypothyroidism  Patient has chronic hypothyroidism. TFTs reviewed-   Lab Results   Component Value Date    TSH 3.050 10/01/2020   . Will continue chronic levothyroxine and adjust for and clinical changes.        Dyslipidemia  Noted chronic problem patient is on ezetimibe  will continue       Discussed with patient's , answered all questions.  Patient encouraged to work with physical therapy and continue aggressive incentive spirometry.    VTE Risk Mitigation (From admission, onward)         Ordered     enoxaparin injection 40 mg  Every 24 hours      11/11/20 1519     IP VTE HIGH RISK PATIENT  Once      11/11/20 1519     Place sequential compression device  Until discontinued      11/11/20 1519                Discharge Planning   RHIANNON:      Code Status: Full Code   Is the patient medically ready for discharge?:     Reason for patient still in hospital (select all that apply): Patient trending condition  Discharge Plan A: Home                  Praveen Motta MD  Department of Hospital Medicine   [facility]   Cyclophosphamide Counseling:  I discussed with the patient the risks of cyclophosphamide including but not limited to hair loss, hormonal abnormalities, decreased fertility, abdominal pain, diarrhea, nausea and vomiting, bone marrow suppression and infection. The patient understands that monitoring is required while taking this medication.

## 2020-11-14 NOTE — RESPIRATORY THERAPY
11/14/20 1252   Incentive Spirometer   $ Incentive Spirometer Charges done with encouragement   Administration (IS) instruction provided, initial;proper technique demonstrated   Number of Repetitions (IS) 10   Level Incentive Spirometer (mL) 750   Patient Tolerance (IS) good   Pt instructed to do IS 10 breaths per hour, Proper technique demonstrated.

## 2020-11-14 NOTE — RESPIRATORY THERAPY
11/14/20 0817   PRE-TX-O2   O2 Device (Oxygen Therapy) nasal cannula   $ Is the patient on Low Flow Oxygen? Yes   Flow (L/min) 4   SpO2 (!) 93 %   Pulse Oximetry Type Continuous   $ Pulse Oximetry - Multiple Charge Pulse Oximetry - Multiple   Pulse 63   Resp 18

## 2020-11-14 NOTE — ASSESSMENT & PLAN NOTE
- COVID-19 testin2000  - Infection Control notified     - Isolation:   - Airborne, Contact and Droplet Precautions  - Cohort patients into COVID units  - N95 mask, wear eye protection  - 20 second hand hygiene              - Limit visitors per hospital policy              - Consolidating lab draws, nursing care, provider visits, and interventions    - Diagnostics: (leukopenia, hyponatremia, hyperferritinemia, elevated troponin, elevated d-dimer, age, and comorbidities are significant predictors of poor clinical outcome)  CBC, CMP, Procalcitonin, Ferritin, CRP and Portable CXR   - COVID-19 testing Collection Date:  2020  - Infection Control notified      - Isolation:   - Airborne, Contact and Droplet Precautions  - Cohort patients into COVID units              - Limit visitors per hospital policy              - Consolidating lab draws, nursing care, provider visits, and interventions        - Management:  Supplemental O2 to maintain SpO2 >92%  Telemetry  Continuous/intermittent Pulse Ox  Albuterol treatment PRN   Pulmonology followwing  On dexamethasone 6 mg daily for 10 days  Receiving intravenous Remdesivir.   Status post convalescent plasma transfusion 2020.  Advance Care Planning          - Management:  Supplemental O2 to maintain SpO2 >92%  Telemetry  Continuous/intermittent Pulse Ox  Albuterol treatment PRN    Advance Care Planning     Code Status  Patient is full code

## 2020-11-14 NOTE — ASSESSMENT & PLAN NOTE
Continue supplemental oxygen via nasal cannula pulmonology is following  Patient currently on 4 LOf nasal cannula

## 2020-11-14 NOTE — PLAN OF CARE
Pt alert and oriented. Aware of POC. Afebrile. Oxygen @ 95% with supplemental O2 @ 4L nasal canula. Airborne, contact, and droplet precautions maintained. Stool specimen to rule out c-diff. Loose stools x 2 this shift. Ambulatory to bedside commode with c/o SOB. Incentive spirometer given. Remdesivir administered as previously ordered. Bed in lowest position. Side rails up x 3. Call light/ needed items at bedside. Safety maintained. Continue with current plan of care.

## 2020-11-14 NOTE — HOSPITAL COURSE
11/14 - presently on 4 liter/minute supplemental oxygen via nasal cannula.  Patient is feeling somewhat better.  No chest pain reported.  Receiving intravenous REM does severe.  Patient received convalescent plasma on November 11, 2020.  Patient is afebrile.  Reports dyspnea on exertion and generalized weakness.  Appetite is on lower side.    11/15/2020 - On 5 liter/minute supplemental oxygen via nasal cannula.  Patient is feeling somewhat better.  No chest pain reported.  Receiving intravenous Remdesivir.  Patient received convalescent plasma on November 11, 2020.  Patient is afebrile. Still with DALEY.    11/16/2020 - Still on 5 liter/minute supplemental oxygen via nasal cannula.  Patient feeling stronger. No chest pain reported.  Receiving intravenous Remdesivir.  Patient received convalescent plasma on November 11, 2020.  Patient is afebrile. Still with DALEY.

## 2020-11-14 NOTE — PLAN OF CARE
Patient AAO X 4. Patient free from injury during shift. Pain managed pharmacologically. Provided full relief. Patient free from N/V during shift. IV access has saline lock. Patient placed on cardiac monitoring and cont. Pulse ox. Remained afebrile during shift. Pt on O2 @ 4L. tolerating well. Restroom offered. Repositioned as needed. Safety maintained with bed alarm. Bed in lowest position, call light and personal items within reach. Patient verbalized understanding of care. Will continue to monitor with every 2 hour rounding.

## 2020-11-15 LAB
ALBUMIN SERPL BCP-MCNC: 2.7 G/DL (ref 3.5–5.2)
ALP SERPL-CCNC: 129 U/L (ref 55–135)
ALT SERPL W/O P-5'-P-CCNC: 312 U/L (ref 10–44)
ANION GAP SERPL CALC-SCNC: 12 MMOL/L (ref 8–16)
AST SERPL-CCNC: 184 U/L (ref 10–40)
BASOPHILS # BLD AUTO: 0.01 K/UL (ref 0–0.2)
BASOPHILS NFR BLD: 0.2 % (ref 0–1.9)
BILIRUB SERPL-MCNC: 0.3 MG/DL (ref 0.1–1)
BUN SERPL-MCNC: 21 MG/DL (ref 8–23)
CALCIUM SERPL-MCNC: 8.1 MG/DL (ref 8.7–10.5)
CHLORIDE SERPL-SCNC: 107 MMOL/L (ref 95–110)
CO2 SERPL-SCNC: 24 MMOL/L (ref 23–29)
CREAT SERPL-MCNC: 0.7 MG/DL (ref 0.5–1.4)
D DIMER PPP IA.FEU-MCNC: 0.56 MG/L FEU
DIFFERENTIAL METHOD: ABNORMAL
EOSINOPHIL # BLD AUTO: 0 K/UL (ref 0–0.5)
EOSINOPHIL NFR BLD: 0 % (ref 0–8)
ERYTHROCYTE [DISTWIDTH] IN BLOOD BY AUTOMATED COUNT: 13.9 % (ref 11.5–14.5)
ERYTHROCYTE [SEDIMENTATION RATE] IN BLOOD BY WESTERGREN METHOD: 20 MM/HR (ref 0–20)
EST. GFR  (AFRICAN AMERICAN): >60 ML/MIN/1.73 M^2
EST. GFR  (NON AFRICAN AMERICAN): >60 ML/MIN/1.73 M^2
FERRITIN SERPL-MCNC: 1001 NG/ML (ref 20–300)
GLUCOSE SERPL-MCNC: 133 MG/DL (ref 70–110)
HCT VFR BLD AUTO: 41.1 % (ref 37–48.5)
HGB BLD-MCNC: 13.2 G/DL (ref 12–16)
IMM GRANULOCYTES # BLD AUTO: 0.1 K/UL (ref 0–0.04)
IMM GRANULOCYTES NFR BLD AUTO: 1.7 % (ref 0–0.5)
LYMPHOCYTES # BLD AUTO: 1 K/UL (ref 1–4.8)
LYMPHOCYTES NFR BLD: 17.6 % (ref 18–48)
MAGNESIUM SERPL-MCNC: 2.2 MG/DL (ref 1.6–2.6)
MCH RBC QN AUTO: 29.8 PG (ref 27–31)
MCHC RBC AUTO-ENTMCNC: 32.1 G/DL (ref 32–36)
MCV RBC AUTO: 93 FL (ref 82–98)
MONOCYTES # BLD AUTO: 0.3 K/UL (ref 0.3–1)
MONOCYTES NFR BLD: 4.7 % (ref 4–15)
NEUTROPHILS # BLD AUTO: 4.5 K/UL (ref 1.8–7.7)
NEUTROPHILS NFR BLD: 75.8 % (ref 38–73)
NRBC BLD-RTO: 0 /100 WBC
PHOSPHATE SERPL-MCNC: 3 MG/DL (ref 2.7–4.5)
PLATELET # BLD AUTO: 273 K/UL (ref 150–350)
PMV BLD AUTO: 10.2 FL (ref 9.2–12.9)
POTASSIUM SERPL-SCNC: 4.3 MMOL/L (ref 3.5–5.1)
PROT SERPL-MCNC: 5.6 G/DL (ref 6–8.4)
RBC # BLD AUTO: 4.43 M/UL (ref 4–5.4)
SODIUM SERPL-SCNC: 143 MMOL/L (ref 136–145)
WBC # BLD AUTO: 5.91 K/UL (ref 3.9–12.7)

## 2020-11-15 PROCEDURE — 85379 FIBRIN DEGRADATION QUANT: CPT

## 2020-11-15 PROCEDURE — 94761 N-INVAS EAR/PLS OXIMETRY MLT: CPT

## 2020-11-15 PROCEDURE — 99232 SBSQ HOSP IP/OBS MODERATE 35: CPT | Mod: ,,, | Performed by: INTERNAL MEDICINE

## 2020-11-15 PROCEDURE — 85651 RBC SED RATE NONAUTOMATED: CPT

## 2020-11-15 PROCEDURE — 12000002 HC ACUTE/MED SURGE SEMI-PRIVATE ROOM

## 2020-11-15 PROCEDURE — 94799 UNLISTED PULMONARY SVC/PX: CPT

## 2020-11-15 PROCEDURE — 25000003 PHARM REV CODE 250: Performed by: INTERNAL MEDICINE

## 2020-11-15 PROCEDURE — 63600175 PHARM REV CODE 636 W HCPCS: Performed by: INTERNAL MEDICINE

## 2020-11-15 PROCEDURE — 27000221 HC OXYGEN, UP TO 24 HOURS

## 2020-11-15 PROCEDURE — 36415 COLL VENOUS BLD VENIPUNCTURE: CPT

## 2020-11-15 PROCEDURE — 97161 PT EVAL LOW COMPLEX 20 MIN: CPT

## 2020-11-15 PROCEDURE — 83735 ASSAY OF MAGNESIUM: CPT

## 2020-11-15 PROCEDURE — 85025 COMPLETE CBC W/AUTO DIFF WBC: CPT

## 2020-11-15 PROCEDURE — 80053 COMPREHEN METABOLIC PANEL: CPT

## 2020-11-15 PROCEDURE — 84100 ASSAY OF PHOSPHORUS: CPT

## 2020-11-15 PROCEDURE — 97116 GAIT TRAINING THERAPY: CPT

## 2020-11-15 PROCEDURE — 99232 PR SUBSEQUENT HOSPITAL CARE,LEVL II: ICD-10-PCS | Mod: ,,, | Performed by: INTERNAL MEDICINE

## 2020-11-15 PROCEDURE — 82728 ASSAY OF FERRITIN: CPT

## 2020-11-15 RX ADMIN — LACTOBACILLUS ACIDOPHILUS / LACTOBACILLUS BULGARICUS 1 EACH: 100 MILLION CFU STRENGTH GRANULES at 09:11

## 2020-11-15 RX ADMIN — HYDROCODONE BITARTRATE AND ACETAMINOPHEN 1 TABLET: 5; 325 TABLET ORAL at 06:11

## 2020-11-15 RX ADMIN — ENOXAPARIN SODIUM 40 MG: 40 INJECTION SUBCUTANEOUS at 06:11

## 2020-11-15 RX ADMIN — EZETIMIBE 10 MG: 10 TABLET ORAL at 09:11

## 2020-11-15 RX ADMIN — CHOLECALCIFEROL (VITAMIN D3) 25 MCG (1,000 UNIT) TABLET 1000 UNITS: TABLET at 09:11

## 2020-11-15 RX ADMIN — LACTOBACILLUS ACIDOPHILUS / LACTOBACILLUS BULGARICUS 1 EACH: 100 MILLION CFU STRENGTH GRANULES at 08:11

## 2020-11-15 RX ADMIN — LEVOTHYROXINE SODIUM 50 MCG: 50 TABLET ORAL at 09:11

## 2020-11-15 RX ADMIN — PANTOPRAZOLE SODIUM 40 MG: 40 TABLET, DELAYED RELEASE ORAL at 09:11

## 2020-11-15 RX ADMIN — DEXAMETHASONE 6 MG: 4 TABLET ORAL at 09:11

## 2020-11-15 RX ADMIN — HYDROCODONE BITARTRATE AND ACETAMINOPHEN 1 TABLET: 5; 325 TABLET ORAL at 12:11

## 2020-11-15 RX ADMIN — THERA TABS 1 TABLET: TAB at 09:11

## 2020-11-15 RX ADMIN — Medication 500 MG: at 08:11

## 2020-11-15 RX ADMIN — Medication 500 MG: at 09:11

## 2020-11-15 RX ADMIN — REMDESIVIR 100 MG: 100 INJECTION, POWDER, LYOPHILIZED, FOR SOLUTION INTRAVENOUS at 06:11

## 2020-11-15 NOTE — PT/OT/SLP EVAL
"Physical Therapy Evaluation    Patient Name:  Michelle Frazier   MRN:  [id number]    Recommendations:     Discharge Recommendations:  home health PT   Discharge Equipment Recommendations: walker, rolling   Barriers to discharge: None    Assessment:     Michelle Frazier is a 65 y.o. female admitted with a medical diagnosis of Pneumonia due to COVID-19 virus.  She presents with the following impairments/functional limitations:  weakness, impaired endurance, impaired self care skills, impaired functional mobilty, gait instability, impaired cardiopulmonary response to activity . Pt alert, apprehensive about mobility training. Pt ambulated within room 30ft with slow and guarded rona/narrow support base, on 5 liters of O2. Pt completed thera ex. Pt to benefit from continued therapies.    Rehab Prognosis: Fair; patient would benefit from acute skilled PT services to address these deficits and reach maximum level of function.    Recent Surgery: * No surgery found *      Plan:     During this hospitalization, patient to be seen 6 x/week to address the identified rehab impairments via gait training, therapeutic activities, therapeutic exercises and progress toward the following goals:    · Plan of Care Expires:  12/30/20    Subjective   Pt stated that she is employed at [employer] in [place] everyday  Stated "this is not so bad"  Stated she aches everywhere  Chief Complaint: fearful of moving- was really bad when I came in  Patient/Family Comments/goals: get well  Pain/Comfort:  · Pain Rating 1: 0/10    Patients cultural, spiritual, Mormon conflicts given the current situation:      Living Environment:  Home with spouse  Prior to admission, patients level of function was independent/employed.  Equipment used at home: none.  DME owned (not currently used): none.  Upon discharge, patient will have assistance from family.    Objective:     Communicated with nurse Salas prior to session.  Patient found HOB elevated " with telemetry, oxygen, pulse ox (continuous), SCD  upon PT entry to room.    General Precautions: Standard, airborne, contact, droplet, fall   Orthopedic Precautions:N/A   Braces: N/A     Exams:  · Postural Exam:  Patient presented with the following abnormalities: -       Rounded shoulders  · -       Forward head  · RLE ROM: WFL  · RLE Strength: Deficits: 3+/5  · LLE ROM: WFL  · LLE Strength: Deficits: 3+/5    Functional Mobility:  · Bed Mobility:     · Rolling Right: minimum assistance  · Scooting: minimum assistance  · Supine to Sit: minimum assistance  · Sit to Supine: minimum assistance  · Transfers:     · Sit to Stand:  minimum assistance with rolling walker  · Gait: 30ft within room with RW and O2 at 5 liters, slow  rona, decreased BEN    Therapeutic Activities and Exercises:   Patient was educated on the importance of OOB activity and functional mobility to negate negative effects of prolonged bed rest during hospitalization, safe transfers and ambulation, and D/C planning   Pt completed thera ex with AP,LAQ,marches x 10-20 reps  Educated on AP, SLRx 100 reps while awake    AM-PAC 6 CLICK MOBILITY  Total Score:16     Patient left HOB elevated with all lines intact, call button in reach, bed alarm on and nurse Josue notified.    GOALS:   Multidisciplinary Problems     Physical Therapy Goals        Problem: Physical Therapy Goal    Goal Priority Disciplines Outcome Goal Variances Interventions   Physical Therapy Goal     PT, PT/OT Ongoing, Progressing     Description: Goals to be met by: 2020     Patient will increase functional independence with mobility by performin. Supine to sit with Contact Guard Assistance  2. Sit to stand transfer with Contact Guard Assistance  3. Bed to chair transfer with Contact Guard Assistance using Rolling Walker  4. Gait  x 150 feet with Contact Guard Assistance using Rolling Walker.   5. Lower extremity exercise program x20 reps                     History:      Past Medical History:   Diagnosis Date    Arthralgia of multiple joints 10/1/2020    Diverticulosis of intestine 10/1/2020    Dyslipidemia 10/1/2020    GERD (gastroesophageal reflux disease)     Hypothyroidism        Past Surgical History:   Procedure Laterality Date    APPENDECTOMY      CARPAL TUNNEL RELEASE Right     COLONOSCOPY      EYE SURGERY      cataracts       Time Tracking:     PT Received On: 11/15/20  PT Start Time: 1042     PT Stop Time: 1106  PT Total Time (min): 24 min     Billable Minutes: Evaluation 10 and Gait Training 14      Yi Ramirez, PT  11/15/2020

## 2020-11-15 NOTE — PROGRESS NOTES
[facility] Hospital Medicine  Progress Note    Patient Name: Michelle Frazier  MRN: [id number]  Patient Class: IP- Inpatient   Admission Date: 11/11/2020  Length of Stay: 2 days  Attending Physician: Praveen Motta MD  Primary Care Provider: Matt Beltre Jr, MD        Subjective:     Principal Problem:Pneumonia due to COVID-19 virus        HPI:  Mr. Frazier is 65 year old  female with past medical history significant for hypothyroidism, dyslipidemia, gastroesophageal reflux disease who presented to the ER with complaints of worsening shortness of breath.  Patient was diagnosed with COVID-19 infection a week ago.  States that her symptoms started worsening since yesterday.  Earlier she was doing well not having any symptoms.  She was taking Tylenol and over-the-counter cold medication.  Patient was not checking her home oxygen saturation.  She denies any other family member being sick.  Patient complains of some nausea, epigastric pain, diarrhea.  Denies any melena, hematemesis denies vomiting denies chest pain, palpitations.    Overview/Hospital Course:  11/14 - presently on 4 liter/minute supplemental oxygen via nasal cannula.  Patient is feeling somewhat better.  No chest pain reported.  Receiving intravenous REM does severe.  Patient received convalescent plasma on November 11, 2020.  Patient is afebrile.  Reports dyspnea on exertion and generalized weakness.  Appetite is on lower side.    11/15/2020 - On 5 liter/minute supplemental oxygen via nasal cannula.  Patient is feeling somewhat better.  No chest pain reported.  Receiving intravenous Remdesivir.  Patient received convalescent plasma on November 11, 2020.  Patient is afebrile. Still with DALEY.        Interval History:    11/15/2020 - On 5 liter/minute supplemental oxygen via nasal cannula.  Patient is feeling somewhat better.  No chest pain reported.  Receiving intravenous Remdesivir.  Patient received convalescent plasma on November  11, 2020.  Patient is afebrile. Still with DALEY.    Review of Systems   Constitutional: Positive for activity change, appetite change and fatigue. Negative for fever.   HENT: Negative for congestion and sore throat.    Eyes: Negative for discharge and visual disturbance.   Respiratory: Positive for shortness of breath. Negative for cough, chest tightness and wheezing.    Cardiovascular: Negative for chest pain, palpitations and leg swelling.   Gastrointestinal: Positive for abdominal pain, diarrhea and nausea. Negative for abdominal distention and constipation.   Endocrine: Negative for cold intolerance and heat intolerance.   Genitourinary: Negative for dysuria, hematuria and urgency.   Musculoskeletal: Positive for myalgias. Negative for arthralgias and gait problem.   Skin: Negative for rash.   Allergic/Immunologic: Negative for immunocompromised state.   Neurological: Positive for headaches. Negative for dizziness and weakness.   Hematological: Does not bruise/bleed easily.   Psychiatric/Behavioral: The patient is not nervous/anxious.      Objective:     Vital Signs (Most Recent):  Temp: 97.8 °F (36.6 °C) (11/15/20 0900)  Pulse: 62 (11/15/20 0900)  Resp: 18 (11/15/20 0900)  BP: 134/68 (11/15/20 0301)  SpO2: (!) 94 % (11/15/20 0900) Vital Signs (24h Range):  Temp:  [96.1 °F (35.6 °C)-97.9 °F (36.6 °C)] 97.8 °F (36.6 °C)  Pulse:  [55-62] 62  Resp:  [17-20] 18  SpO2:  [94 %-95 %] 94 %  BP: (134-152)/(68-72) 134/68     Weight: 80.8 kg (178 lb 2.1 oz)  Body mass index is 32.58 kg/m².  No intake or output data in the 24 hours ending 11/15/20 1007   Physical Exam  Constitutional:       Appearance: Normal appearance.   HENT:      Head: Normocephalic and atraumatic.      Right Ear: External ear normal.      Left Ear: External ear normal.      Mouth/Throat:      Mouth: Mucous membranes are dry.   Eyes:      Extraocular Movements: Extraocular movements intact.      Pupils: Pupils are equal, round, and reactive to light.    Neck:      Musculoskeletal: Normal range of motion and neck supple.   Cardiovascular:      Rate and Rhythm: Normal rate and regular rhythm.      Pulses: Normal pulses.      Heart sounds: Normal heart sounds.   Pulmonary:      Effort: Pulmonary effort is normal.      Breath sounds: Normal breath sounds.   Abdominal:      General: Abdomen is flat. Bowel sounds are normal.      Tenderness: There is abdominal tenderness.   Musculoskeletal: Normal range of motion.   Skin:     General: Skin is warm.      Capillary Refill: Capillary refill takes 2 to 3 seconds.   Neurological:      General: No focal deficit present.      Mental Status: She is alert.   Psychiatric:         Mood and Affect: Mood normal.         Significant Labs:   BMP:   Recent Labs   Lab 11/15/20  0553   *      K 4.3      CO2 24   BUN 21   CREATININE 0.7   CALCIUM 8.1*   MG 2.2     Microbiology Results (last 7 days)     Procedure Component Value Units Date/Time    Blood culture (site 1) [[id number]] Collected: 11/11/20 1630    Order Status: Completed Specimen: Blood from Antecubital, Right Arm Updated: 11/15/20 0612     Blood Culture, Routine No Growth to date      No Growth to date      No Growth to date      No Growth to date    Narrative:      Site # 1, aerobic and anaerobic    Blood culture (site 2) [[id number]] Collected: 11/11/20 1630    Order Status: Completed Specimen: Blood from Peripheral, Left Hand Updated: 11/15/20 0612     Blood Culture, Routine No Growth to date      No Growth to date      No Growth to date      No Growth to date    Narrative:      Site # 2, aerobic only    Clostridium difficile EIA [[id number]] Resulted: 11/14/20 1705    Order Status: No result Updated: 11/14/20 1705    Clostridium difficile EIA [[id number]] Collected: 11/14/20 1645    Order Status: Canceled Specimen: Stool         Significant Imaging:  CXR: A peripheral interstitial infiltrate is identified in the left lung but not in the right lung.  This  is an unusual pattern but viral pneumonitis is still suspected    CXR; There is bibasilar atelectasis versus infiltrate new from prior exam.      Assessment/Plan:      * Pneumonia due to COVID-19 virus  - COVID-19 testin2000  - Infection Control notified     - Isolation:   - Airborne, Contact and Droplet Precautions  - Cohort patients into COVID units  - N95 mask, wear eye protection  - 20 second hand hygiene              - Limit visitors per hospital policy              - Consolidating lab draws, nursing care, provider visits, and interventions    - Diagnostics: (leukopenia, hyponatremia, hyperferritinemia, elevated troponin, elevated d-dimer, age, and comorbidities are significant predictors of poor clinical outcome)  CBC, CMP, Procalcitonin, Ferritin, CRP and Portable CXR   - COVID-19 testing Collection Date:  2020  - Infection Control notified      - Isolation:   - Airborne, Contact and Droplet Precautions  - Cohort patients into COVID units              - Limit visitors per hospital policy              - Consolidating lab draws, nursing care, provider visits, and interventions        - Management:  Supplemental O2 to maintain SpO2 >92%  Telemetry  Continuous/intermittent Pulse Ox  Albuterol treatment PRN   Pulmonology followwing  On dexamethasone 6 mg daily for 10 days  Receiving intravenous Remdesivir.   Status post convalescent plasma transfusion 2020.  Advance Care Planning          - Management:  Supplemental O2 to maintain SpO2 >92%  Telemetry  Continuous/intermittent Pulse Ox  Albuterol treatment PRN    Advance Care Planning     Code Status  Patient is full code         Dehydration  Patient started tolerating oral diet and monitor BMP      Acute hypoxemic respiratory failure  Continue supplemental oxygen via nasal cannula pulmonology is following  Patient currently on 4 LOf nasal cannula      GERD (gastroesophageal reflux disease)  Noted, chronic problem will continue  pantoprazole      Hypothyroidism  Patient has chronic hypothyroidism. TFTs reviewed-   Lab Results   Component Value Date    TSH 3.050 10/01/2020   . Will continue chronic levothyroxine and adjust for and clinical changes.        Dyslipidemia  Noted chronic problem patient is on ezetimibe will continue        VTE Risk Mitigation (From admission, onward)         Ordered     enoxaparin injection 40 mg  Every 24 hours      11/11/20 1519     IP VTE HIGH RISK PATIENT  Once      11/11/20 1519     Place sequential compression device  Until discontinued      11/11/20 1519                Discharge Planning   RHIANNON:  November 17, 2020    Code Status: Full Code   Is the patient medically ready for discharge?:     Reason for patient still in hospital (select all that apply): Patient trending condition  Discharge Plan A: Home                  Praveen Motta MD  Department of Hospital Medicine   [facility]

## 2020-11-15 NOTE — PLAN OF CARE
11/14/20 1947   Patient Assessment/Suction   Level of Consciousness (AVPU) alert   Respiratory Effort Unlabored   Expansion/Accessory Muscles/Retractions no use of accessory muscles   PRE-TX-O2   O2 Device (Oxygen Therapy) nasal cannula   $ Is the patient on Low Flow Oxygen? Yes   Flow (L/min) 5   SpO2 95 %   Pulse Oximetry Type Continuous   $ Pulse Oximetry - Multiple Charge Pulse Oximetry - Multiple   Pulse (!) 57   Resp 18   Incentive Spirometer   $ Incentive Spirometer Charges done with encouragement   Administration (IS) instruction provided, follow-up   Number of Repetitions (IS) 8   Level Incentive Spirometer (mL) 750   Patient Tolerance (IS) fair

## 2020-11-15 NOTE — PROGRESS NOTES
Progress Note  PULMONARY    Admit Date: 11/11/2020   11/15/2020    11/11 consult by Dr Fagan:   History of Present Illness:  Pt healthy, lives in [place] with .  Has good function, developed headaches and n/v/d last 10 days , takes in very little.  Voiding minimally.  Pt developed sob ppt er visit.  covid + on 11/4 .  Breathing well now on nc ox.  Headache severe, not eating. Plan:  November 11th temperature max 101.6, ferritin 458, creatinine 0.7, chest x-ray ground-glass opacity in the lateral left lung-right lung looks clear.   Will give couple liters d5 1/2.   norco for headahce prn   Discussed remdesivir and plasma- kodi late in course but may have some benefit- pt wishes to receive.  sterroids ordered. Discussed bipap/vent if needed.    Monitoring and support.  SUBJECTIVE:     11/15 no new c/o-no nausea      PFSH and Allergies reviewed.    OBJECTIVE:     Vitals (Most recent):  Vitals:    11/15/20 0900   BP:    Pulse: 62   Resp: 18   Temp: 97.8 °F (36.6 °C)       Vitals (24 hour range):  Temp:  [96.1 °F (35.6 °C)-97.9 °F (36.6 °C)]   Pulse:  [55-62]   Resp:  [17-20]   BP: (134-152)/(68-72)   SpO2:  [94 %-95 %]     No intake or output data in the 24 hours ending 11/15/20 1238       Physical Exam:  The patient's neuro status (alertness,orientation,cognitive function,motor skills,), pharyngeal exam (oral lesions, hygiene, abn dentition,), Neck (jvd,mass,thyroid,nodes in neck and above/below clavicle),RESPIRATORY(symmetry,effort,fremitus,percussion,auscultation),  Cor(rhythm,heart tones including gallops,perfusion,edema)ABD(distention,hepatic&splenomegaly,tenderness,masses), Skin(rash,cyanosis),Psyc(affect,judgement,).  Exam negative except for these pertinent findings:    Alert,min rales,chest is symmetric, no distress, normal percussion, normal fremitus and rest good.  Liver non tender,not enlarged.    Radiographs reviewed: view by direct vision cxr 11/13 sl worse than 11/11.  Results for orders placed  during the hospital encounter of 11/11/20   X-Ray Chest 1 View    Narrative EXAMINATION:  XR CHEST 1 VIEW    CLINICAL HISTORY:  COVID-19 pneumonia;    TECHNIQUE:  Single frontal view of the chest was performed.    COMPARISON:  11/11/2020    FINDINGS:  There is left greater than right basilar atelectasis versus infiltrate new from prior exam.  The cardiac silhouette is unchanged.      Impression There is bibasilar atelectasis versus infiltrate new from prior exam.      Electronically signed by: Wendy Frederick MD  Date:    11/13/2020  Time:    14:59   ]    Labs     Recent Labs   Lab 11/15/20  0553   WBC 5.91   HGB 13.2   HCT 41.1        Recent Labs   Lab 11/15/20  0553      K 4.3      CO2 24   BUN 21   CREATININE 0.7   *   CALCIUM 8.1*   MG 2.2   PHOS 3.0   *   *   ALKPHOS 129   BILITOT 0.3   PROT 5.6*   ALBUMIN 2.7*   SEDRATE 20   No results for input(s): PH, PCO2, PO2, HCO3 in the last 24 hours.  Microbiology Results (last 7 days)     Procedure Component Value Units Date/Time    Blood culture (site 1) [[id number]] Collected: 11/11/20 1630    Order Status: Completed Specimen: Blood from Antecubital, Right Arm Updated: 11/15/20 0612     Blood Culture, Routine No Growth to date      No Growth to date      No Growth to date      No Growth to date    Narrative:      Site # 1, aerobic and anaerobic    Blood culture (site 2) [[id number]] Collected: 11/11/20 1630    Order Status: Completed Specimen: Blood from Peripheral, Left Hand Updated: 11/15/20 0612     Blood Culture, Routine No Growth to date      No Growth to date      No Growth to date      No Growth to date    Narrative:      Site # 2, aerobic only    Clostridium difficile EIA [[id number]] Resulted: 11/14/20 1705    Order Status: No result Updated: 11/14/20 1705    Clostridium difficile EIA [[id number]] Collected: 11/14/20 1645    Order Status: Canceled Specimen: Stool           Impression:  Active Hospital Problems     Diagnosis  POA    *Pneumonia due to COVID-19 virus [U07.1, J12.89]  Yes    Acute hypoxemic respiratory failure [J96.01]  Yes    Dehydration [E86.0]  Yes    Dyslipidemia [E78.5]  Yes    Hypothyroidism [E03.9]  Yes    GERD (gastroesophageal reflux disease) [K21.9]  Yes      Resolved Hospital Problems   No resolved problems to display.               Plan:   11/15- afebrile since 11th, off abx, on decadron since 11th, needs 5lpm increased from 4 on 13th,     lft with ast 184 from 38 on 12th. Monitor.                                .

## 2020-11-15 NOTE — PLAN OF CARE
Problem: Physical Therapy Goal  Goal: Physical Therapy Goal  Description: Goals to be met by: 2020     Patient will increase functional independence with mobility by performin. Supine to sit with Contact Guard Assistance  2. Sit to stand transfer with Contact Guard Assistance  3. Bed to chair transfer with Contact Guard Assistance using Rolling Walker  4. Gait  x 150 feet with Contact Guard Assistance using Rolling Walker.   5. Lower extremity exercise program x20 reps    Outcome: Ongoing, Progressing   PT eval and treat. Gait within room 30ft with RW and O2 at 5 liters. Slow rona,apprehensive. Thera ex x 20 reps to LE's. Encouraged exercises throughout day

## 2020-11-15 NOTE — SUBJECTIVE & OBJECTIVE
Interval History:    11/15/2020 - On 5 liter/minute supplemental oxygen via nasal cannula.  Patient is feeling somewhat better.  No chest pain reported.  Receiving intravenous Remdesivir.  Patient received convalescent plasma on November 11, 2020.  Patient is afebrile. Still with DALEY.    Review of Systems   Constitutional: Positive for activity change, appetite change and fatigue. Negative for fever.   HENT: Negative for congestion and sore throat.    Eyes: Negative for discharge and visual disturbance.   Respiratory: Positive for shortness of breath. Negative for cough, chest tightness and wheezing.    Cardiovascular: Negative for chest pain, palpitations and leg swelling.   Gastrointestinal: Positive for abdominal pain, diarrhea and nausea. Negative for abdominal distention and constipation.   Endocrine: Negative for cold intolerance and heat intolerance.   Genitourinary: Negative for dysuria, hematuria and urgency.   Musculoskeletal: Positive for myalgias. Negative for arthralgias and gait problem.   Skin: Negative for rash.   Allergic/Immunologic: Negative for immunocompromised state.   Neurological: Positive for headaches. Negative for dizziness and weakness.   Hematological: Does not bruise/bleed easily.   Psychiatric/Behavioral: The patient is not nervous/anxious.      Objective:     Vital Signs (Most Recent):  Temp: 97.8 °F (36.6 °C) (11/15/20 0900)  Pulse: 62 (11/15/20 0900)  Resp: 18 (11/15/20 0900)  BP: 134/68 (11/15/20 0301)  SpO2: (!) 94 % (11/15/20 0900) Vital Signs (24h Range):  Temp:  [96.1 °F (35.6 °C)-97.9 °F (36.6 °C)] 97.8 °F (36.6 °C)  Pulse:  [55-62] 62  Resp:  [17-20] 18  SpO2:  [94 %-95 %] 94 %  BP: (134-152)/(68-72) 134/68     Weight: 80.8 kg (178 lb 2.1 oz)  Body mass index is 32.58 kg/m².  No intake or output data in the 24 hours ending 11/15/20 1007   Physical Exam  Constitutional:       Appearance: Normal appearance.   HENT:      Head: Normocephalic and atraumatic.      Right Ear:  External ear normal.      Left Ear: External ear normal.      Mouth/Throat:      Mouth: Mucous membranes are dry.   Eyes:      Extraocular Movements: Extraocular movements intact.      Pupils: Pupils are equal, round, and reactive to light.   Neck:      Musculoskeletal: Normal range of motion and neck supple.   Cardiovascular:      Rate and Rhythm: Normal rate and regular rhythm.      Pulses: Normal pulses.      Heart sounds: Normal heart sounds.   Pulmonary:      Effort: Pulmonary effort is normal.      Breath sounds: Normal breath sounds.   Abdominal:      General: Abdomen is flat. Bowel sounds are normal.      Tenderness: There is abdominal tenderness.   Musculoskeletal: Normal range of motion.   Skin:     General: Skin is warm.      Capillary Refill: Capillary refill takes 2 to 3 seconds.   Neurological:      General: No focal deficit present.      Mental Status: She is alert.   Psychiatric:         Mood and Affect: Mood normal.         Significant Labs:   BMP:   Recent Labs   Lab 11/15/20  0553   *      K 4.3      CO2 24   BUN 21   CREATININE 0.7   CALCIUM 8.1*   MG 2.2     Microbiology Results (last 7 days)     Procedure Component Value Units Date/Time    Blood culture (site 1) [[id number]] Collected: 11/11/20 1630    Order Status: Completed Specimen: Blood from Antecubital, Right Arm Updated: 11/15/20 0612     Blood Culture, Routine No Growth to date      No Growth to date      No Growth to date      No Growth to date    Narrative:      Site # 1, aerobic and anaerobic    Blood culture (site 2) [[id number]] Collected: 11/11/20 1630    Order Status: Completed Specimen: Blood from Peripheral, Left Hand Updated: 11/15/20 0612     Blood Culture, Routine No Growth to date      No Growth to date      No Growth to date      No Growth to date    Narrative:      Site # 2, aerobic only    Clostridium difficile EIA [[id number]] Resulted: 11/14/20 1705    Order Status: No result Updated: 11/14/20 1705     Clostridium difficile EIA [[id number]] Collected: 11/14/20 4906    Order Status: Canceled Specimen: Stool         Significant Imaging:  CXR: A peripheral interstitial infiltrate is identified in the left lung but not in the right lung.  This is an unusual pattern but viral pneumonitis is still suspected    CXR; There is bibasilar atelectasis versus infiltrate new from prior exam.

## 2020-11-15 NOTE — RESPIRATORY THERAPY
11/15/20 0736   Patient Assessment/Suction   Level of Consciousness (AVPU) alert   Respiratory Effort Normal;Unlabored   Expansion/Accessory Muscles/Retractions no use of accessory muscles   PRE-TX-O2   O2 Device (Oxygen Therapy) nasal cannula   $ Is the patient on Low Flow Oxygen? Yes   Flow (L/min) 5   SpO2 95 %   Pulse Oximetry Type Continuous   $ Pulse Oximetry - Multiple Charge Pulse Oximetry - Multiple   Pulse (!) 55   Resp 18   Incentive Spirometer   $ Incentive Spirometer Charges done with encouragement   Administration (IS) proper technique demonstrated   Number of Repetitions (IS) 8   Level Incentive Spirometer (mL) 500   Patient Tolerance (IS) fair

## 2020-11-16 LAB
ALBUMIN SERPL BCP-MCNC: 2.5 G/DL (ref 3.5–5.2)
ALP SERPL-CCNC: 127 U/L (ref 55–135)
ALT SERPL W/O P-5'-P-CCNC: 249 U/L (ref 10–44)
ANION GAP SERPL CALC-SCNC: 7 MMOL/L (ref 8–16)
AST SERPL-CCNC: 86 U/L (ref 10–40)
BASOPHILS # BLD AUTO: 0.02 K/UL (ref 0–0.2)
BASOPHILS NFR BLD: 0.3 % (ref 0–1.9)
BILIRUB SERPL-MCNC: 0.4 MG/DL (ref 0.1–1)
BUN SERPL-MCNC: 18 MG/DL (ref 8–23)
CALCIUM SERPL-MCNC: 7.8 MG/DL (ref 8.7–10.5)
CHLORIDE SERPL-SCNC: 108 MMOL/L (ref 95–110)
CO2 SERPL-SCNC: 25 MMOL/L (ref 23–29)
CREAT SERPL-MCNC: 0.7 MG/DL (ref 0.5–1.4)
DIFFERENTIAL METHOD: ABNORMAL
EOSINOPHIL # BLD AUTO: 0 K/UL (ref 0–0.5)
EOSINOPHIL NFR BLD: 0 % (ref 0–8)
ERYTHROCYTE [DISTWIDTH] IN BLOOD BY AUTOMATED COUNT: 13.4 % (ref 11.5–14.5)
EST. GFR  (AFRICAN AMERICAN): >60 ML/MIN/1.73 M^2
EST. GFR  (NON AFRICAN AMERICAN): >60 ML/MIN/1.73 M^2
GLUCOSE SERPL-MCNC: 133 MG/DL (ref 70–110)
HCT VFR BLD AUTO: 43.3 % (ref 37–48.5)
HGB BLD-MCNC: 14.1 G/DL (ref 12–16)
IMM GRANULOCYTES # BLD AUTO: 0.29 K/UL (ref 0–0.04)
IMM GRANULOCYTES NFR BLD AUTO: 4.1 % (ref 0–0.5)
LYMPHOCYTES # BLD AUTO: 1.2 K/UL (ref 1–4.8)
LYMPHOCYTES NFR BLD: 17.4 % (ref 18–48)
MAGNESIUM SERPL-MCNC: 2.3 MG/DL (ref 1.6–2.6)
MCH RBC QN AUTO: 29.3 PG (ref 27–31)
MCHC RBC AUTO-ENTMCNC: 32.6 G/DL (ref 32–36)
MCV RBC AUTO: 90 FL (ref 82–98)
MONOCYTES # BLD AUTO: 0.5 K/UL (ref 0.3–1)
MONOCYTES NFR BLD: 7.1 % (ref 4–15)
NEUTROPHILS # BLD AUTO: 5.1 K/UL (ref 1.8–7.7)
NEUTROPHILS NFR BLD: 71.1 % (ref 38–73)
NRBC BLD-RTO: 0 /100 WBC
PHOSPHATE SERPL-MCNC: 3.1 MG/DL (ref 2.7–4.5)
PLATELET # BLD AUTO: 285 K/UL (ref 150–350)
PMV BLD AUTO: 10.2 FL (ref 9.2–12.9)
POTASSIUM SERPL-SCNC: 4.2 MMOL/L (ref 3.5–5.1)
PROT SERPL-MCNC: 5.4 G/DL (ref 6–8.4)
RBC # BLD AUTO: 4.82 M/UL (ref 4–5.4)
SODIUM SERPL-SCNC: 140 MMOL/L (ref 136–145)
WBC # BLD AUTO: 7.14 K/UL (ref 3.9–12.7)

## 2020-11-16 PROCEDURE — 27000221 HC OXYGEN, UP TO 24 HOURS

## 2020-11-16 PROCEDURE — 94761 N-INVAS EAR/PLS OXIMETRY MLT: CPT

## 2020-11-16 PROCEDURE — 80074 ACUTE HEPATITIS PANEL: CPT

## 2020-11-16 PROCEDURE — 97116 GAIT TRAINING THERAPY: CPT

## 2020-11-16 PROCEDURE — 94799 UNLISTED PULMONARY SVC/PX: CPT

## 2020-11-16 PROCEDURE — 12000002 HC ACUTE/MED SURGE SEMI-PRIVATE ROOM

## 2020-11-16 PROCEDURE — 80053 COMPREHEN METABOLIC PANEL: CPT

## 2020-11-16 PROCEDURE — 83735 ASSAY OF MAGNESIUM: CPT

## 2020-11-16 PROCEDURE — 36415 COLL VENOUS BLD VENIPUNCTURE: CPT

## 2020-11-16 PROCEDURE — 25000003 PHARM REV CODE 250: Performed by: INTERNAL MEDICINE

## 2020-11-16 PROCEDURE — 63600175 PHARM REV CODE 636 W HCPCS: Performed by: INTERNAL MEDICINE

## 2020-11-16 PROCEDURE — 84100 ASSAY OF PHOSPHORUS: CPT

## 2020-11-16 PROCEDURE — 85025 COMPLETE CBC W/AUTO DIFF WBC: CPT

## 2020-11-16 PROCEDURE — 99232 PR SUBSEQUENT HOSPITAL CARE,LEVL II: ICD-10-PCS | Mod: ,,, | Performed by: INTERNAL MEDICINE

## 2020-11-16 PROCEDURE — 99232 SBSQ HOSP IP/OBS MODERATE 35: CPT | Mod: ,,, | Performed by: INTERNAL MEDICINE

## 2020-11-16 RX ADMIN — PANTOPRAZOLE SODIUM 40 MG: 40 TABLET, DELAYED RELEASE ORAL at 08:11

## 2020-11-16 RX ADMIN — THERA TABS 1 TABLET: TAB at 08:11

## 2020-11-16 RX ADMIN — Medication 500 MG: at 08:11

## 2020-11-16 RX ADMIN — EZETIMIBE 10 MG: 10 TABLET ORAL at 08:11

## 2020-11-16 RX ADMIN — HYDROCODONE BITARTRATE AND ACETAMINOPHEN 1 TABLET: 5; 325 TABLET ORAL at 12:11

## 2020-11-16 RX ADMIN — ENOXAPARIN SODIUM 40 MG: 40 INJECTION SUBCUTANEOUS at 04:11

## 2020-11-16 RX ADMIN — REMDESIVIR 100 MG: 100 INJECTION, POWDER, LYOPHILIZED, FOR SOLUTION INTRAVENOUS at 04:11

## 2020-11-16 RX ADMIN — DEXAMETHASONE 6 MG: 4 TABLET ORAL at 08:11

## 2020-11-16 RX ADMIN — LACTOBACILLUS ACIDOPHILUS / LACTOBACILLUS BULGARICUS 1 EACH: 100 MILLION CFU STRENGTH GRANULES at 08:11

## 2020-11-16 RX ADMIN — CHOLECALCIFEROL (VITAMIN D3) 25 MCG (1,000 UNIT) TABLET 1000 UNITS: TABLET at 08:11

## 2020-11-16 RX ADMIN — LEVOTHYROXINE SODIUM 50 MCG: 50 TABLET ORAL at 08:11

## 2020-11-16 NOTE — SUBJECTIVE & OBJECTIVE
Interval History:    11/16/2020 - Still on 5 liter/minute supplemental oxygen via nasal cannula.  Patient feeling stronger. No chest pain reported.  Receiving intravenous Remdesivir.  Patient received convalescent plasma on November 11, 2020.  Patient is afebrile. Still with DALEY.    Review of Systems   Constitutional: Positive for activity change, appetite change and fatigue. Negative for fever.   HENT: Negative for congestion and sore throat.    Eyes: Negative for discharge and visual disturbance.   Respiratory: Positive for shortness of breath. Negative for cough, chest tightness and wheezing.    Cardiovascular: Negative for chest pain, palpitations and leg swelling.   Gastrointestinal: Positive for abdominal pain, diarrhea and nausea. Negative for abdominal distention and constipation.   Endocrine: Negative for cold intolerance and heat intolerance.   Genitourinary: Negative for dysuria, hematuria and urgency.   Musculoskeletal: Positive for myalgias. Negative for arthralgias and gait problem.   Skin: Negative for rash.   Allergic/Immunologic: Negative for immunocompromised state.   Neurological: Positive for headaches. Negative for dizziness and weakness.   Hematological: Does not bruise/bleed easily.   Psychiatric/Behavioral: The patient is not nervous/anxious.      Objective:     Vital Signs (Most Recent):  Temp: 98.5 °F (36.9 °C) (11/16/20 0829)  Pulse: 67 (11/16/20 0829)  Resp: 18 (11/16/20 0829)  BP: 132/75 (11/16/20 0829)  SpO2: 97 % (11/16/20 0829) Vital Signs (24h Range):  Temp:  [97.1 °F (36.2 °C)-98.6 °F (37 °C)] 98.5 °F (36.9 °C)  Pulse:  [52-74] 67  Resp:  [18-20] 18  SpO2:  [94 %-99 %] 97 %  BP: (132-162)/(65-79) 132/75     Weight: 80.8 kg (178 lb 2.1 oz)  Body mass index is 32.58 kg/m².    Intake/Output Summary (Last 24 hours) at 11/16/2020 0964  Last data filed at 11/15/2020 2033  Gross per 24 hour   Intake 480 ml   Output --   Net 480 ml      Physical Exam  Constitutional:       Appearance:  Normal appearance.   HENT:      Head: Normocephalic and atraumatic.      Right Ear: External ear normal.      Left Ear: External ear normal.      Mouth/Throat:      Mouth: Mucous membranes are dry.   Eyes:      Extraocular Movements: Extraocular movements intact.      Pupils: Pupils are equal, round, and reactive to light.   Neck:      Musculoskeletal: Normal range of motion and neck supple.   Cardiovascular:      Rate and Rhythm: Normal rate and regular rhythm.      Pulses: Normal pulses.      Heart sounds: Normal heart sounds.   Pulmonary:      Effort: Pulmonary effort is normal.      Breath sounds: Normal breath sounds.   Abdominal:      General: Abdomen is flat. Bowel sounds are normal.      Tenderness: There is abdominal tenderness.   Musculoskeletal: Normal range of motion.   Skin:     General: Skin is warm.      Capillary Refill: Capillary refill takes 2 to 3 seconds.   Neurological:      General: No focal deficit present.      Mental Status: She is alert.   Psychiatric:         Mood and Affect: Mood normal.         Significant Labs:   BMP:   Recent Labs   Lab 11/16/20  0622   *      K 4.2      CO2 25   BUN 18   CREATININE 0.7   CALCIUM 7.8*   MG 2.3     Microbiology Results (last 7 days)     Procedure Component Value Units Date/Time    Blood culture (site 1) [[id number]] Collected: 11/11/20 1630    Order Status: Completed Specimen: Blood from Antecubital, Right Arm Updated: 11/16/20 0612     Blood Culture, Routine No Growth to date      No Growth to date      No Growth to date      No Growth to date      No Growth to date    Narrative:      Site # 1, aerobic and anaerobic    Blood culture (site 2) [[id number]] Collected: 11/11/20 1630    Order Status: Completed Specimen: Blood from Peripheral, Left Hand Updated: 11/16/20 0612     Blood Culture, Routine No Growth to date      No Growth to date      No Growth to date      No Growth to date      No Growth to date    Narrative:      Site # 2,  aerobic only    Clostridium difficile EIA [[id number]] Resulted: 11/14/20 1705    Order Status: No result Updated: 11/14/20 1705    Clostridium difficile EIA [[id number]] Collected: 11/14/20 1645    Order Status: Canceled Specimen: Stool         Significant Imaging:  CXR: A peripheral interstitial infiltrate is identified in the left lung but not in the right lung.  This is an unusual pattern but viral pneumonitis is still suspected    CXR; There is bibasilar atelectasis versus infiltrate new from prior exam.

## 2020-11-16 NOTE — PROGRESS NOTES
[facility] Hospital Medicine  Progress Note    Patient Name: Michelle Frazier  MRN: [id number]  Patient Class: IP- Inpatient   Admission Date: 11/11/2020  Length of Stay: 3 days  Attending Physician: Praveen Motta MD  Primary Care Provider: Matt Beltre Jr, MD        Subjective:     Principal Problem:Pneumonia due to COVID-19 virus        HPI:  Mr. Frazier is 65 year old  female with past medical history significant for hypothyroidism, dyslipidemia, gastroesophageal reflux disease who presented to the ER with complaints of worsening shortness of breath.  Patient was diagnosed with COVID-19 infection a week ago.  States that her symptoms started worsening since yesterday.  Earlier she was doing well not having any symptoms.  She was taking Tylenol and over-the-counter cold medication.  Patient was not checking her home oxygen saturation.  She denies any other family member being sick.  Patient complains of some nausea, epigastric pain, diarrhea.  Denies any melena, hematemesis denies vomiting denies chest pain, palpitations.    Overview/Hospital Course:  11/14 - presently on 4 liter/minute supplemental oxygen via nasal cannula.  Patient is feeling somewhat better.  No chest pain reported.  Receiving intravenous REM does severe.  Patient received convalescent plasma on November 11, 2020.  Patient is afebrile.  Reports dyspnea on exertion and generalized weakness.  Appetite is on lower side.    11/15/2020 - On 5 liter/minute supplemental oxygen via nasal cannula.  Patient is feeling somewhat better.  No chest pain reported.  Receiving intravenous Remdesivir.  Patient received convalescent plasma on November 11, 2020.  Patient is afebrile. Still with DALEY.    11/16/2020 - Still on 5 liter/minute supplemental oxygen via nasal cannula.  Patient feeling stronger. No chest pain reported.  Receiving intravenous Remdesivir.  Patient received convalescent plasma on November 11, 2020.  Patient is afebrile.  Still with DALEY.        Interval History:    11/16/2020 - Still on 5 liter/minute supplemental oxygen via nasal cannula.  Patient feeling stronger. No chest pain reported.  Receiving intravenous Remdesivir.  Patient received convalescent plasma on November 11, 2020.  Patient is afebrile. Still with DALEY.    Review of Systems   Constitutional: Positive for activity change, appetite change and fatigue. Negative for fever.   HENT: Negative for congestion and sore throat.    Eyes: Negative for discharge and visual disturbance.   Respiratory: Positive for shortness of breath. Negative for cough, chest tightness and wheezing.    Cardiovascular: Negative for chest pain, palpitations and leg swelling.   Gastrointestinal: Positive for abdominal pain, diarrhea and nausea. Negative for abdominal distention and constipation.   Endocrine: Negative for cold intolerance and heat intolerance.   Genitourinary: Negative for dysuria, hematuria and urgency.   Musculoskeletal: Positive for myalgias. Negative for arthralgias and gait problem.   Skin: Negative for rash.   Allergic/Immunologic: Negative for immunocompromised state.   Neurological: Positive for headaches. Negative for dizziness and weakness.   Hematological: Does not bruise/bleed easily.   Psychiatric/Behavioral: The patient is not nervous/anxious.      Objective:     Vital Signs (Most Recent):  Temp: 98.5 °F (36.9 °C) (11/16/20 0829)  Pulse: 67 (11/16/20 0829)  Resp: 18 (11/16/20 0829)  BP: 132/75 (11/16/20 0829)  SpO2: 97 % (11/16/20 0829) Vital Signs (24h Range):  Temp:  [97.1 °F (36.2 °C)-98.6 °F (37 °C)] 98.5 °F (36.9 °C)  Pulse:  [52-74] 67  Resp:  [18-20] 18  SpO2:  [94 %-99 %] 97 %  BP: (132-162)/(65-79) 132/75     Weight: 80.8 kg (178 lb 2.1 oz)  Body mass index is 32.58 kg/m².    Intake/Output Summary (Last 24 hours) at 11/16/2020 0935  Last data filed at 11/15/2020 2033  Gross per 24 hour   Intake 480 ml   Output --   Net 480 ml      Physical Exam  Constitutional:        Appearance: Normal appearance.   HENT:      Head: Normocephalic and atraumatic.      Right Ear: External ear normal.      Left Ear: External ear normal.      Mouth/Throat:      Mouth: Mucous membranes are dry.   Eyes:      Extraocular Movements: Extraocular movements intact.      Pupils: Pupils are equal, round, and reactive to light.   Neck:      Musculoskeletal: Normal range of motion and neck supple.   Cardiovascular:      Rate and Rhythm: Normal rate and regular rhythm.      Pulses: Normal pulses.      Heart sounds: Normal heart sounds.   Pulmonary:      Effort: Pulmonary effort is normal.      Breath sounds: Normal breath sounds.   Abdominal:      General: Abdomen is flat. Bowel sounds are normal.      Tenderness: There is abdominal tenderness.   Musculoskeletal: Normal range of motion.   Skin:     General: Skin is warm.      Capillary Refill: Capillary refill takes 2 to 3 seconds.   Neurological:      General: No focal deficit present.      Mental Status: She is alert.   Psychiatric:         Mood and Affect: Mood normal.         Significant Labs:   BMP:   Recent Labs   Lab 11/16/20  0622   *      K 4.2      CO2 25   BUN 18   CREATININE 0.7   CALCIUM 7.8*   MG 2.3     Microbiology Results (last 7 days)     Procedure Component Value Units Date/Time    Blood culture (site 1) [[id number]] Collected: 11/11/20 1630    Order Status: Completed Specimen: Blood from Antecubital, Right Arm Updated: 11/16/20 0612     Blood Culture, Routine No Growth to date      No Growth to date      No Growth to date      No Growth to date      No Growth to date    Narrative:      Site # 1, aerobic and anaerobic    Blood culture (site 2) [[id number]] Collected: 11/11/20 1630    Order Status: Completed Specimen: Blood from Peripheral, Left Hand Updated: 11/16/20 0612     Blood Culture, Routine No Growth to date      No Growth to date      No Growth to date      No Growth to date      No Growth to date    Narrative:       Site # 2, aerobic only    Clostridium difficile EIA [[id number]] Resulted: 20    Order Status: No result Updated: 20    Clostridium difficile EIA [[id number]] Collected: 20    Order Status: Canceled Specimen: Stool         Significant Imaging:  CXR: A peripheral interstitial infiltrate is identified in the left lung but not in the right lung.  This is an unusual pattern but viral pneumonitis is still suspected    CXR; There is bibasilar atelectasis versus infiltrate new from prior exam.      Assessment/Plan:      * Pneumonia due to COVID-19 virus  - COVID-19 testin2000  - Infection Control notified     - Isolation:   - Airborne, Contact and Droplet Precautions  - Cohort patients into COVID units  - N95 mask, wear eye protection  - 20 second hand hygiene              - Limit visitors per hospital policy              - Consolidating lab draws, nursing care, provider visits, and interventions    - Diagnostics: (leukopenia, hyponatremia, hyperferritinemia, elevated troponin, elevated d-dimer, age, and comorbidities are significant predictors of poor clinical outcome)  CBC, CMP, Procalcitonin, Ferritin, CRP and Portable CXR   - COVID-19 testing Collection Date:  2020  - Infection Control notified      - Isolation:   - Airborne, Contact and Droplet Precautions  - Cohort patients into COVID units              - Limit visitors per hospital policy              - Consolidating lab draws, nursing care, provider visits, and interventions        - Management:  Supplemental O2 to maintain SpO2 >92%  Telemetry  Continuous/intermittent Pulse Ox  Albuterol treatment PRN   Pulmonology followwing  On dexamethasone 6 mg daily for 10 days  Receiving intravenous Remdesivir.   Status post convalescent plasma transfusion 2020.  Advance Care Planning          - Management:  Supplemental O2 to maintain SpO2 >92%  Telemetry  Continuous/intermittent Pulse Ox  Albuterol treatment  PRN    Advance Care Planning     Code Status  Patient is full code         Dehydration  Patient started tolerating oral diet and monitor BMP      Acute hypoxemic respiratory failure  Continue supplemental oxygen via nasal cannula pulmonology is following  Patient currently on 4 LOf nasal cannula      GERD (gastroesophageal reflux disease)  Noted, chronic problem will continue pantoprazole      Hypothyroidism  Patient has chronic hypothyroidism. TFTs reviewed-   Lab Results   Component Value Date    TSH 3.050 10/01/2020   . Will continue chronic levothyroxine and adjust for and clinical changes.        Dyslipidemia  Noted chronic problem patient is on ezetimibe will continue        VTE Risk Mitigation (From admission, onward)         Ordered     enoxaparin injection 40 mg  Every 24 hours      11/11/20 1519     IP VTE HIGH RISK PATIENT  Once      11/11/20 1519     Place sequential compression device  Until discontinued      11/11/20 1519                Discharge Planning   RHIANNON:  11/17/20    Code Status: Full Code   Is the patient medically ready for discharge?:     Reason for patient still in hospital (select all that apply): Patient trending condition  Discharge Plan A: Home        Praveen Motta MD  Department of Hospital Medicine   [facility]

## 2020-11-16 NOTE — PLAN OF CARE
Afebrile. VSS.   Decreased to 3L O2 via nasal canula.  NSR on tele monitor.  Drinking and eating well.   Plan of care reviewed with patient and verbalizes understanding.   Will continue to monitor.

## 2020-11-16 NOTE — PLAN OF CARE
Afebrile. VSS. Remains on 5L O2 via nasal canula. Drinking and eating well. Frequent and safety checks completed. Plan of care reviewed with patient and verbalizes understanding. Will continue to monitor.

## 2020-11-16 NOTE — PLAN OF CARE
May D/C special contact isolation - no specimen collected.  Continue COVID isolation.  Call Infection Control for questions.

## 2020-11-16 NOTE — RESPIRATORY THERAPY
11/16/20 0745   PRE-TX-O2   O2 Device (Oxygen Therapy) nasal cannula   $ Is the patient on Low Flow Oxygen? Yes   Flow (L/min) 5   SpO2 98 %   Pulse Oximetry Type Continuous   $ Pulse Oximetry - Multiple Charge Pulse Oximetry - Multiple   Pulse 74   Resp 18

## 2020-11-16 NOTE — PLAN OF CARE
11/15/20 1933   Patient Assessment/Suction   Level of Consciousness (AVPU) alert   Respiratory Effort Normal;Unlabored   Expansion/Accessory Muscles/Retractions no use of accessory muscles   All Lung Fields Breath Sounds diminished   Rhythm/Pattern, Respiratory unlabored   Cough Frequency no cough   PRE-TX-O2   O2 Device (Oxygen Therapy) nasal cannula   $ Is the patient on Low Flow Oxygen? Yes   Flow (L/min) 5   SpO2 96 %   Pulse Oximetry Type Continuous   $ Pulse Oximetry - Multiple Charge Pulse Oximetry - Multiple   Pulse 60   Resp 20   Incentive Spirometer   $ Incentive Spirometer Charges done with encouragement   Administration (IS) instruction provided, follow-up;proper technique demonstrated   Number of Repetitions (IS) 10   Level Incentive Spirometer (mL) 750   Patient Tolerance (IS) good

## 2020-11-16 NOTE — PROGRESS NOTES
Progress Note  PULMONARY    Admit Date: 11/11/2020 11/16/2020 11/11 consult by Dr Fagan:   History of Present Illness:  Pt healthy, lives in [place] with .  Has good function, developed headaches and n/v/d last 10 days , takes in very little.  Voiding minimally.  Pt developed sob ppt er visit.  covid + on 11/4 .  Breathing well now on nc ox.  Headache severe, not eating. Plan:  November 11th temperature max 101.6, ferritin 458, creatinine 0.7, chest x-ray ground-glass opacity in the lateral left lung-right lung looks clear.   Will give couple liters d5 1/2.   norco for headahce prn   Discussed remdesivir and plasma- kodi late in course but may have some benefit- pt wishes to receive.  sterroids ordered. Discussed bipap/vent if needed.    Monitoring and support.  SUBJECTIVE:     11/15 no new c/o-no nausea  November 16th no new complaints.    PFSH and Allergies reviewed.    OBJECTIVE:     Vitals (Most recent):  Vitals:    11/16/20 1105   BP:    Pulse: 79   Resp:    Temp:        Vitals (24 hour range):  Temp:  [97.1 °F (36.2 °C)-98.6 °F (37 °C)]   Pulse:  [52-79]   Resp:  [18-20]   BP: (132-162)/(65-79)   SpO2:  [94 %-99 %]       Intake/Output Summary (Last 24 hours) at 11/16/2020 1139  Last data filed at 11/15/2020 2033  Gross per 24 hour   Intake 480 ml   Output --   Net 480 ml          Physical Exam:  The patient's neuro status (alertness,orientation,cognitive function,motor skills,), pharyngeal exam (oral lesions, hygiene, abn dentition,), Neck (jvd,mass,thyroid,nodes in neck and above/below clavicle),RESPIRATORY(symmetry,effort,fremitus,percussion,auscultation),  Cor(rhythm,heart tones including gallops,perfusion,edema)ABD(distention,hepatic&splenomegaly,tenderness,masses), Skin(rash,cyanosis),Psyc(affect,judgement,).  Exam negative except for these pertinent findings:    Alert,min rales,chest is symmetric, no distress, normal percussion, normal fremitus and rest good.  Liver non tender,not  enlarged.    Radiographs reviewed: view by direct vision cxr 11/13 sl worse than 11/11.  Results for orders placed during the hospital encounter of 11/11/20   X-Ray Chest 1 View    Narrative EXAMINATION:  XR CHEST 1 VIEW    CLINICAL HISTORY:  COVID-19 pneumonia;    TECHNIQUE:  Single frontal view of the chest was performed.    COMPARISON:  11/11/2020    FINDINGS:  There is left greater than right basilar atelectasis versus infiltrate new from prior exam.  The cardiac silhouette is unchanged.      Impression There is bibasilar atelectasis versus infiltrate new from prior exam.      Electronically signed by: Wendy Frederick MD  Date:    11/13/2020  Time:    14:59   ]    Labs     Recent Labs   Lab 11/16/20 0622   WBC 7.14   HGB 14.1   HCT 43.3        Recent Labs   Lab 11/16/20 0622      K 4.2      CO2 25   BUN 18   CREATININE 0.7   *   CALCIUM 7.8*   MG 2.3   PHOS 3.1   AST 86*   *   ALKPHOS 127   BILITOT 0.4   PROT 5.4*   ALBUMIN 2.5*   No results for input(s): PH, PCO2, PO2, HCO3 in the last 24 hours.  Microbiology Results (last 7 days)     Procedure Component Value Units Date/Time    Blood culture (site 1) [[id number]] Collected: 11/11/20 1630    Order Status: Completed Specimen: Blood from Antecubital, Right Arm Updated: 11/16/20 0612     Blood Culture, Routine No Growth to date      No Growth to date      No Growth to date      No Growth to date      No Growth to date    Narrative:      Site # 1, aerobic and anaerobic    Blood culture (site 2) [[id number]] Collected: 11/11/20 1630    Order Status: Completed Specimen: Blood from Peripheral, Left Hand Updated: 11/16/20 0612     Blood Culture, Routine No Growth to date      No Growth to date      No Growth to date      No Growth to date      No Growth to date    Narrative:      Site # 2, aerobic only    Clostridium difficile EIA [[id number]] Resulted: 11/14/20 1705    Order Status: No result Updated: 11/14/20 1705    Clostridium  difficile EIA [[id number]] Collected: 11/14/20 2674    Order Status: Canceled Specimen: Stool           Impression:  Active Hospital Problems    Diagnosis  POA    *Pneumonia due to COVID-19 virus [U07.1, J12.89]  Yes    Acute hypoxemic respiratory failure [J96.01]  Yes    Dehydration [E86.0]  Yes    Dyslipidemia [E78.5]  Yes    Hypothyroidism [E03.9]  Yes    GERD (gastroesophageal reflux disease) [K21.9]  Yes      Resolved Hospital Problems   No resolved problems to display.               Plan:   11/15- afebrile since 11th, off abx, on decadron since 11th, needs 5lpm increased from 4 on 13th,     lft with ast 184 from 38 on 12th. Monitor.      November 16th-on 5 L of oxygen, eating drinking, mobilizing, no fever since the 11th.  On dexamethasone.  Liver functions are now improving with AST down to 86.  Now 3 lpm - home likely am?                      .

## 2020-11-16 NOTE — PT/OT/SLP PROGRESS
Physical Therapy Treatment    Patient Name:  Michelle Frazier   MRN:  [id number]    Recommendations:     Discharge Recommendations:  home health PT   Discharge Equipment Recommendations: walker, rolling   Barriers to discharge: None    Assessment:     Michelle Frazier is a 65 y.o. female admitted with a medical diagnosis of Pneumonia due to COVID-19 virus.  She presents with the following impairments/functional limitations:  weakness, impaired endurance, impaired functional mobilty, gait instability, impaired balance, decreased lower extremity function, decreased ROM .  Patient agreeable to PT treatment his morning to include gait training.  Patient presented supine in bed and was able to transfer to sitting with SBA and stood with CGA.  Patient then ambulated x 25 feet, x 50 feet with no AD with min assist and O2 at 4L.    Rehab Prognosis: Good; patient would benefit from acute skilled PT services to address these deficits and reach maximum level of function.    Recent Surgery: * No surgery found *      Plan:     During this hospitalization, patient to be seen 6 x/week to address the identified rehab impairments via gait training, therapeutic activities, therapeutic exercises and progress toward the following goals:    · Plan of Care Expires:  12/30/20    Subjective     Chief Complaint: fatigue  Patient/Family Comments/goals: go home  Pain/Comfort:  ·        Objective:     Communicated with nurse prior to session.  Patient found supine with bed alarm, oxygen, peripheral IV, SCD, telemetry upon PT entry to room.     General Precautions: Standard, fall, droplet, contact, airborne   Orthopedic Precautions:N/A   Braces:       Functional Mobility:  · Bed Mobility:     · Supine to Sit: stand by assistance  · Sit to Supine: stand by assistance  · Transfers:     · Sit to Stand:  contact guard assistance with no AD  · Gait: x 25 feet, x 50 feet with no AD with min assist      AM-PAC 6 CLICK MOBILITY          Therapeutic Activities and  Exercises:   gait training x 25 feet, x 50 feet with no AD with min assist due to slight balance deficits.    Patient left supine with call button in reach, bed alarm on and nurse notified..    GOALS:   Multidisciplinary Problems     Physical Therapy Goals        Problem: Physical Therapy Goal    Goal Priority Disciplines Outcome Goal Variances Interventions   Physical Therapy Goal     PT, PT/OT Ongoing, Progressing     Description: Goals to be met by: 2020     Patient will increase functional independence with mobility by performin. Supine to sit with Contact Guard Assistance  2. Sit to stand transfer with Contact Guard Assistance  3. Bed to chair transfer with Contact Guard Assistance using Rolling Walker  4. Gait  x 150 feet with Contact Guard Assistance using Rolling Walker.   5. Lower extremity exercise program x20 reps                     Time Tracking:     PT Received On: 20  PT Start Time: 1141     PT Stop Time: 1159  PT Total Time (min): 18 min     Billable Minutes: Gait Training 18    Treatment Type: Treatment  PT/PTA: PT     PTA Visit Number: 0     Kunal Reshmailligan, PT  2020

## 2020-11-17 ENCOUNTER — TELEPHONE (OUTPATIENT)
Dept: PULMONOLOGY | Facility: CLINIC | Age: 65
End: 2020-11-17

## 2020-11-17 ENCOUNTER — NURSE TRIAGE (OUTPATIENT)
Dept: ADMINISTRATIVE | Facility: CLINIC | Age: 65
End: 2020-11-17

## 2020-11-17 ENCOUNTER — TELEPHONE (OUTPATIENT)
Dept: FAMILY MEDICINE | Facility: CLINIC | Age: 65
End: 2020-11-17

## 2020-11-17 VITALS
HEART RATE: 66 BPM | SYSTOLIC BLOOD PRESSURE: 134 MMHG | RESPIRATION RATE: 16 BRPM | DIASTOLIC BLOOD PRESSURE: 67 MMHG | HEIGHT: 62 IN | TEMPERATURE: 97 F | BODY MASS INDEX: 32.78 KG/M2 | OXYGEN SATURATION: 94 % | WEIGHT: 178.13 LBS

## 2020-11-17 LAB
BACTERIA BLD CULT: NORMAL
BACTERIA BLD CULT: NORMAL
ERYTHROCYTE [SEDIMENTATION RATE] IN BLOOD BY WESTERGREN METHOD: 15 MM/HR (ref 0–20)
FERRITIN SERPL-MCNC: 575 NG/ML (ref 20–300)
HAV IGM SERPL QL IA: NEGATIVE
HBV CORE IGM SERPL QL IA: NEGATIVE
HBV SURFACE AG SERPL QL IA: NEGATIVE
HCV AB SERPL QL IA: NEGATIVE

## 2020-11-17 PROCEDURE — 94761 N-INVAS EAR/PLS OXIMETRY MLT: CPT

## 2020-11-17 PROCEDURE — 63600175 PHARM REV CODE 636 W HCPCS: Performed by: INTERNAL MEDICINE

## 2020-11-17 PROCEDURE — 97116 GAIT TRAINING THERAPY: CPT

## 2020-11-17 PROCEDURE — 25000003 PHARM REV CODE 250: Performed by: INTERNAL MEDICINE

## 2020-11-17 PROCEDURE — 85651 RBC SED RATE NONAUTOMATED: CPT

## 2020-11-17 PROCEDURE — 82728 ASSAY OF FERRITIN: CPT

## 2020-11-17 PROCEDURE — 36415 COLL VENOUS BLD VENIPUNCTURE: CPT

## 2020-11-17 RX ORDER — CHOLECALCIFEROL (VITAMIN D3) 25 MCG
1000 TABLET ORAL DAILY
Qty: 30 TABLET | Refills: 0 | Status: SHIPPED | OUTPATIENT
Start: 2020-11-18 | End: 2023-03-16 | Stop reason: ALTCHOICE

## 2020-11-17 RX ORDER — DEXAMETHASONE 6 MG/1
6 TABLET ORAL DAILY
Qty: 3 TABLET | Refills: 0 | Status: SHIPPED | OUTPATIENT
Start: 2020-11-18 | End: 2020-11-28

## 2020-11-17 RX ORDER — ASCORBIC ACID 500 MG
500 TABLET ORAL DAILY
Qty: 30 TABLET | Refills: 0 | COMMUNITY
Start: 2020-11-17 | End: 2021-10-11

## 2020-11-17 RX ADMIN — EZETIMIBE 10 MG: 10 TABLET ORAL at 09:11

## 2020-11-17 RX ADMIN — PANTOPRAZOLE SODIUM 40 MG: 40 TABLET, DELAYED RELEASE ORAL at 09:11

## 2020-11-17 RX ADMIN — DEXAMETHASONE 6 MG: 4 TABLET ORAL at 09:11

## 2020-11-17 RX ADMIN — CHOLECALCIFEROL (VITAMIN D3) 25 MCG (1,000 UNIT) TABLET 1000 UNITS: TABLET at 09:11

## 2020-11-17 RX ADMIN — LACTOBACILLUS ACIDOPHILUS / LACTOBACILLUS BULGARICUS 1 EACH: 100 MILLION CFU STRENGTH GRANULES at 09:11

## 2020-11-17 RX ADMIN — Medication 500 MG: at 09:11

## 2020-11-17 RX ADMIN — LEVOTHYROXINE SODIUM 50 MCG: 50 TABLET ORAL at 09:11

## 2020-11-17 RX ADMIN — THERA TABS 1 TABLET: TAB at 09:11

## 2020-11-17 NOTE — DISCHARGE SUMMARY
[facility] Medicine  Discharge Summary      Patient Name: Michelle Frazier  MRN: [id number]  Admission Date: 11/11/2020  Hospital Length of Stay: 4 days  Discharge Date and Time:  11/17/2020 9:33 AM  Attending Physician: Praveen Motta MD   Discharging Provider: Praveen Motta MD  Primary Care Provider: Matt Beltre Jr, MD      HPI:   Mr. Frazier is 65 year old  female with past medical history significant for hypothyroidism, dyslipidemia, gastroesophageal reflux disease who presented to the ER with complaints of worsening shortness of breath.  Patient was diagnosed with COVID-19 infection a week ago.  States that her symptoms started worsening since yesterday.  Earlier she was doing well not having any symptoms.  She was taking Tylenol and over-the-counter cold medication.  Patient was not checking her home oxygen saturation.  She denies any other family member being sick.  Patient complains of some nausea, epigastric pain, diarrhea.  Denies any melena, hematemesis denies vomiting denies chest pain, palpitations.    Hospital Course:   11/14 - presently on 4 liter/minute supplemental oxygen via nasal cannula.  Patient is feeling somewhat better.  No chest pain reported.  Receiving intravenous REM does severe.  Patient received convalescent plasma on November 11, 2020.  Patient is afebrile.  Reports dyspnea on exertion and generalized weakness.  Appetite is on lower side.    11/15/2020 - On 5 liter/minute supplemental oxygen via nasal cannula.  Patient is feeling somewhat better.  No chest pain reported.  Receiving intravenous Remdesivir.  Patient received convalescent plasma on November 11, 2020.  Patient is afebrile. Still with DALEY.    11/16/2020 - Still on 5 liter/minute supplemental oxygen via nasal cannula.  Patient feeling stronger. No chest pain reported.  Receiving intravenous Remdesivir.  Patient received convalescent plasma on November 11, 2020.  Patient is afebrile. Still with  DALEY.     during hospital stay patient was managed on medicine-surgery floor where COVID-19 respiratory/droplet/airborne/contact precautions were observed.  Patient received dexamethasone therapy, intravenous Remdesivir and convalescent plasma transfusion.  Slowly patient's symptoms improved.  Patient's oxygen requirement has improved with time.  Home supplemental oxygen has been arranged.  During hospital stay patient was closely followed by Pulmonary Medicine.  [facility] COVID-19 discharge plan of care reviewed with the patient.    Consults:   Consults (From admission, onward)        Status Ordering Provider     Inpatient consult to Pulmonology  Once     Provider:  (Not yet assigned)    Completed ELSA KNOWLES     IP consult to case management  Once     Provider:  (Not yet assigned)    Completed ELSA KNOWLES     Pharmacy Remdesivir Consult  Once     Provider:  (Not yet assigned)    Acknowledged TEE DUFF        CXR: A peripheral interstitial infiltrate is identified in the left lung but not in the right lung.  This is an unusual pattern but viral pneumonitis is still suspected     CXR; There is bibasilar atelectasis versus infiltrate new from prior exam.    Final Active Diagnoses:    Diagnosis Date Noted POA    PRINCIPAL PROBLEM:  Pneumonia due to COVID-19 virus [U07.1, J12.89] 11/11/2020 Yes    Acute hypoxemic respiratory failure [J96.01] 11/11/2020 Yes    Dehydration [E86.0] 11/11/2020 Yes    Dyslipidemia [E78.5] 10/01/2020 Yes    Hypothyroidism [E03.9]  Yes    GERD (gastroesophageal reflux disease) [K21.9]  Yes      Problems Resolved During this Admission:       Discharged Condition: good    Disposition:     Follow Up:  Follow-up Information     Matt Beltre Jr, MD In 1 week.    Specialty: Internal Medicine  Contact information:  [address]   [place] [address]  [phone]             Tee Duff MD In 2 weeks.    Specialty: Pulmonary Disease  Contact information:  [address]  [address]  [facility] [address]  [phone]                 Patient Instructions:      Diet Cardiac     COVID-19 Surveillance Program     Order Specific Question Answer Comments   Does patient have a smartphone? Yes    Does patient have the [facility] on their smartphone? Yes    While in surveillance program, will patient be using home oxygen? Yes      Activity as tolerated   Order Comments: Fall precautions       Significant Diagnostic Studies: Labs:   CMP   Recent Labs   Lab 11/16/20  0622      K 4.2      CO2 25   *   BUN 18   CREATININE 0.7   CALCIUM 7.8*   PROT 5.4*   ALBUMIN 2.5*   BILITOT 0.4   ALKPHOS 127   AST 86*   *   ANIONGAP 7*   ESTGFRAFRICA >60   EGFRNONAA >60    and CBC   Recent Labs   Lab 11/16/20  0622   WBC 7.14   HGB 14.1   HCT 43.3          Pending Diagnostic Studies:     Procedure Component Value Units Date/Time    Hepatitis Panel, Acute [[id number]] Collected: 11/16/20 1107    Order Status: Sent Lab Status: In process Updated: 11/16/20 1940    Specimen: Blood          Medications:  Reconciled Home Medications:      Medication List      START taking these medications    ascorbic acid (vitamin C) 500 MG tablet  Commonly known as: VITAMIN C  Take 1 tablet (500 mg total) by mouth once daily.     dexAMETHasone 6 MG tablet  Commonly known as: DECADRON  Take 1 tablet (6 mg total) by mouth once daily. for 10 days  Start taking on: November 18, 2020     pulse oximeter device  Commonly known as: pulse oximeter  by Apply Externally route 2 (two) times a day. Use twice daily at 8 AM and 3 PM and record the value in Violet[facility] as directed.     vitamin D 1000 units Tab  Commonly known as: VITAMIN D3  Take 1 tablet (1,000 Units total) by mouth once daily.  Start taking on: November 18, 2020        CONTINUE taking these medications    cetirizine 10 MG tablet  Commonly known as: ZYRTEC  Take 10 mg by mouth once daily.     diclofenac sodium 1 % Gel  Commonly known as: VOLTAREN  Apply 2 g  topically once daily.     ezetimibe 10 mg tablet  Commonly known as: ZETIA  Take 1 tablet (10 mg total) by mouth once daily.     fluticasone propionate 50 mcg/actuation nasal spray  Commonly known as: FLONASE  2 sprays (100 mcg total) by Each Nostril route daily as needed.     levothyroxine 50 MCG tablet  Commonly known as: SYNTHROID  Take 1 tablet (50 mcg total) by mouth once daily.     pantoprazole 40 MG tablet  Commonly known as: PROTONIX  Take 1 tablet (40 mg total) by mouth once daily.            Indwelling Lines/Drains at time of discharge:   Lines/Drains/Airways     None                 Time spent on the discharge of patient: 33 minutes  Patient was seen and examined on the date of discharge and determined to be suitable for discharge.         Praveen Motta MD  Department of Hospital Medicine  [facility]

## 2020-11-17 NOTE — RESPIRATORY THERAPY
11/17/20 1035   Home Oxygen Qualification   Room Air SpO2 At Rest 94 %   Room Air SpO2 on Exertion (!) 88 %   SpO2 During Exertion on O2 92 %   Heart Rate on O2 92 bpm   Exertion O2 LPM 2 LPM   SpO2 on Recovery 93 %   Recovery Heart Rate 90 bpm   Recovery O2 LPM 2 LPM   Home O2 Eval Comments Pt walked around room.  Dropped to 88 after 2 minutes.  Pt SOB during walk.

## 2020-11-17 NOTE — PLAN OF CARE
The pt is discharging home and is clear for discharge from case management. Nelly Washington, NAOMI     11/17/20 0045   Final Note   Assessment Type Final Discharge Note   Anticipated Discharge Disposition Home   Hospital Follow Up  Appt(s) scheduled? Yes

## 2020-11-17 NOTE — PT/OT/SLP PROGRESS
Physical Therapy Treatment    Patient Name:  Michelle Frazier   MRN:  [id number]    Recommendations:     Discharge Recommendations:  home health PT   Discharge Equipment Recommendations: walker, rolling   Barriers to discharge: None    Assessment:     Michelle Frazier is a 65 y.o. female admitted with a medical diagnosis of Pneumonia due to COVID-19 virus.  She presents with the following impairments/functional limitations:  weakness, impaired endurance, impaired functional mobilty, gait instability, impaired balance, decreased lower extremity function, decreased ROM .  Patient agreeable to PT treatment to include gait training this morning.  Patient presented supine in bed and was able to transfer to sitting with supervision then then stood with SBA.  Patient then ambulated x 60 feet with no AD with CGA due to balance deficits.  Patient should be instructed in use of RW again next treatment.    Rehab Prognosis: Good; patient would benefit from acute skilled PT services to address these deficits and reach maximum level of function.    Recent Surgery: * No surgery found *      Plan:     During this hospitalization, patient to be seen 6 x/week to address the identified rehab impairments via gait training, therapeutic activities, therapeutic exercises and progress toward the following goals:    · Plan of Care Expires:  12/30/20    Subjective     Chief Complaint: fatigue  Patient/Family Comments/goals: go home  Pain/Comfort:  ·        Objective:     Communicated with nurse prior to session.  Patient found supine with bed alarm, oxygen, pulse ox (continuous), telemetry upon PT entry to room.     General Precautions: Standard, fall, droplet, contact, airborne   Orthopedic Precautions:N/A   Braces:       Functional Mobility:  · Bed Mobility:     · Supine to Sit: supervision  · Sit to Supine: supervision  · Transfers:     · Sit to Stand:  stand by assistance with no AD  · Gait: x 60 feet with RW with CGA and O2      AM-PAC 6 CLICK  MOBILITY          Therapeutic Activities and Exercises:   gait training x 60 feet with no AD with CGA and supplemental O2    Patient left supine with call button in reach, nurse notified and spouse present..    GOALS:   Multidisciplinary Problems     Physical Therapy Goals        Problem: Physical Therapy Goal    Goal Priority Disciplines Outcome Goal Variances Interventions   Physical Therapy Goal     PT, PT/OT Ongoing, Progressing     Description: Goals to be met by: 2020     Patient will increase functional independence with mobility by performin. Supine to sit with Contact Guard Assistance  2. Sit to stand transfer with Contact Guard Assistance  3. Bed to chair transfer with Contact Guard Assistance using Rolling Walker  4. Gait  x 150 feet with Contact Guard Assistance using Rolling Walker.   5. Lower extremity exercise program x20 reps                     Time Tracking:     PT Received On: 20  PT Start Time: 1115     PT Stop Time: 1132  PT Total Time (min): 17 min     Billable Minutes: Gait Training 17    Treatment Type: Treatment  PT/PTA: PT     PTA Visit Number: 0     Chris Megilligan, PT  2020

## 2020-11-17 NOTE — PLAN OF CARE
Discharge instructions discussed with pt. Patient verbalized understanding. All personal belongings accounted for. PIV taken out with cath intact. Pt tolerated well. Pt transported off floor via staff transport to front entrance.

## 2020-11-17 NOTE — DISCHARGE INSTRUCTIONS
Instructions for Patients with Confirmed or Suspected COVID-19    If you are awaiting your test result, you will either be called or it will be released to the patient portal.  If you have any questions about your test, please visit [email or web address] or call our COVID-19 information line at [phone].      Instructions for non-hospitalized or discharged patients with confirmed or suspected COVID-19:       Stay home except to get medical care.    Separate yourself from other people and animals in your home.    Call ahead before visiting your doctor.    Wear a face mask.    Cover your coughs and sneezes.    Clean your hands often.    Avoid sharing personal household items.    Clean all high-touch surfaces every day.    Monitor your symptoms. Seek prompt medical attention if your illness is worsening (e.g., difficulty breathing). Before seeking care, call your healthcare provider.    If you have a medical emergency and must call 911, notify the dispatcher that you have or are being evaluated for COVID-19. If possible, put on a face mask before emergency medical services arrive.    Use the following symptom-based strategy to return to normal activity following a suspected or confirmed case of COVID-19. Continue isolation until:   o At least 3 days (72 hours) have passed since recovery defined as resolution of fever without the use of fever-reducing medications and improvement in respiratory symptoms (e.g. cough, shortness of breath), and   o At least 10 days have passed since the first positive test.       As one of the next steps, you will receive a call or text from the [place] Department of Health ([facility]) COVID-19 Tracing Team. See the contact information below so you know not to ignore the health departments call. It is important that you contact them back immediately so they can help.     Contact Tracer Number:  [phone]  Caller ID for most carriers: [place] Dept [facility]    What is  contact tracing?   Contact tracing is a process that helps identify everyone who has been in close contact with an infected person. Contact tracers let those people know they may have been exposed and guide them on next steps. Confidentiality is important for everyone; no one will be told who may have exposed them to the virus.   Your involvement is important. The more we know about where and how this virus is spreading, the better chance we have at stopping it from spreading further.  What does exposure mean?   Exposure means you have been within 6 feet for more than 15 minutes with a person who has or had COVID-19.  What kind of questions do the contact tracers ask?   A contact tracer will confirm your basic contact information including name, address, phone number, and next of kin, as well as asking about any symptoms you may have had. Theyll also ask you how you think you may have gotten sick, such as places where you may have been exposed to the virus, and people you were with. Those names will never be shared with anyone outside of that call, and will only be used to help trace and stop the spread of the virus.   I have privacy concerns. How will the state use my information?   Your privacy about your health is important. All calls are completed using call centers that use the appropriate health privacy protection measures (HIPAA compliance), meaning that your patient information is safe. No one will ever ask you any questions related to immigration status. Your health comes first.   Do I have to participate?   You do not have to participate, but we strongly encourage you to. Contact tracing can help us catch and control new outbreaks as theyre developing to keep your friends and family safe.   What if I dont hear from anyone?   If you dont receive a call within 24 hours, you can call the number above right away to inquire about your status. That line is open from 8:00 am - 8:00 p.m., 7 days a  week.  Contact tracing saves lives! Together, we have the power to beat this virus and keep our loved ones and neighbors safe.       Instructions for household members, intimate partners and caregivers in a non-healthcare setting of a patient with confirmed or suspected COVID-19:         Close contacts should monitor their health and call their healthcare provider right away if they develop symptoms suggestive of COVID-19 (e.g., fever, cough, shortness of breath).    Stay home except to get medical care. Separate yourself from other people and animals in the home.   Monitor the patients symptoms. If the patient is getting sicker, call his or her healthcare provider. If the patient has a medical emergency and you need to call 911, notify the dispatch personnel that the patient has or is being evaluated for COVID-19.    Wear a facemask when around other people such as sharing a room or vehicle and before entering a healthcare provider's office.   Cover coughs and sneezes with a tissue. Throw used tissues in a lined trash can immediately and wash hands.   Clean hands often with soap and water for at least 20 seconds or with an alcohol-based hand , rubbing hands together until they feel dry. Avoid touching your eyes, nose, and mouth with unwashed hands.   Clean all high-touch; surfaces every day, including counters, tabletops, doorknobs, bathroom fixtures, toilets, phones, keyboards, tablets, bedside tables, etc. Use a household cleaning spray or wipe according to label instructions.   Avoid sharing personal household items such as dishes, drinking glasses, cups, towels, bedding, etc. After these items are used, they should be washed thoroughly with soap and water.   Continue isolation until:   At least 3 days (72 hours) have passed since recovery defined as resolution of fever without the use of fever-reducing medications and improvement in respiratory symptoms (e.g. cough, shortness of breath),  and    At least 10 days have passed since the patients first positive test.    https://www.cdc.gov/coronavirus/2019-ncov/your-health/index.htm

## 2020-11-17 NOTE — PLAN OF CARE
The pt is clear for discharge from case management. Nelly Washington LCSW     11/17/20 8720   Final Note   Assessment Type Final Discharge Note   Anticipated Discharge Disposition Home

## 2020-11-17 NOTE — NURSING
Pt was instructed on how to use home O2. Pt return demonstrated and verbalized understanding. Pt adequate for discharge pending release from pulmonary.

## 2020-11-17 NOTE — PLAN OF CARE
Pt qualifies for home O2. Awaiting orders. Reena with [facility] updated Nelly Washington, ADLOW     11/17/20 1151   Post-Acute Status   Post-Acute Authorization HME   HME Status Awaiting Clarification Orders

## 2020-11-17 NOTE — PLAN OF CARE
PDMP reviewed; no aberrant behavior identified, prescription authorized.   Pt is calm and cooperative, iv access maintained, fall precautions maintained, tele monitor in place, po intake tolerated well, pt rested well, air bourne precautions maintained, will continue to monitor.

## 2020-11-17 NOTE — TELEPHONE ENCOUNTER
Spoke to pt, gave appt for 11/24 @ 2pm with da godfrey----- Message from Aggie Swanson sent at 11/17/2020 10:32 AM CST -----  Regarding: Appt Access  Contact: Angela  Patient: Michelle Frazier  Phone: [phone]    Angela  from [facility] requesting hospital follow up appt for Michelle Frazier. No appt availability until Dec 24. Pt was inpatient due to covid from 11/14-11/17. Please call pt for appt access. Thank you

## 2020-11-17 NOTE — PLAN OF CARE
Cm sent order message to Respiratory for home o2 education.       11/17/20 1314   Discharge Reassessment   Assessment Type Discharge Planning Reassessment

## 2020-11-17 NOTE — PLAN OF CARE
CM is awaiting home O2 evaluation before delivering home Pulse ox . Nelly Washington, NAOMI     11/17/20 1039   Post-Acute Status   Post-Acute Authorization HME   E Status Referrals Sent

## 2020-11-17 NOTE — PLAN OF CARE
Approval from Reena with [facility] to pull 2 home O2 tanks and she spoke to the pts spouse about home delivery. Pts pulse ox and home O2 given to pts nurse Sofya and delivery ticket returned to depot. Nelly Washington, NAOMI     11/17/20 1314   Post-Acute Status   Post-Acute Authorization HME   E Status Set-up Complete

## 2020-11-17 NOTE — TELEPHONE ENCOUNTER
HFU DC'd 11/17/20  Per CM @ [facility], pt scheduled for HFU 12/2/20.  CM requested 2+ weeks out, pt is Covid+.  Sched 12/2/20 and notified the CM.

## 2020-11-18 ENCOUNTER — PATIENT MESSAGE (OUTPATIENT)
Dept: ADMINISTRATIVE | Facility: OTHER | Age: 65
End: 2020-11-18

## 2020-11-18 ENCOUNTER — PATIENT OUTREACH (OUTPATIENT)
Dept: ADMINISTRATIVE | Facility: CLINIC | Age: 65
End: 2020-11-18

## 2020-11-18 ENCOUNTER — NURSE TRIAGE (OUTPATIENT)
Dept: ADMINISTRATIVE | Facility: CLINIC | Age: 65
End: 2020-11-18

## 2020-11-18 NOTE — PATIENT INSTRUCTIONS
Pneumonia (Adult)  Pneumonia is an infection deep within the lungs. It is in the small air sacs (alveoli). Pneumonia may be caused by a virus or bacteria. Pneumonia caused by bacteria is usually treated with an antibiotic. Severe cases may need to be treated in the hospital. Milder cases can be treated at home. Symptoms usually start to get better during the first 2 days of treatment.    Home care  Follow these guidelines when caring for yourself at home:  · Rest at home for the first 2 to 3 days, or until you feel stronger. Dont let yourself get overly tired when you go back to your activities.  · Stay away from cigarette smoke - yours or other peoples.  · You may use acetaminophen or ibuprofen to control fever or pain, unless another medicine was prescribed. If you have chronic liver or kidney disease, talk with your healthcare provider before using these medicines. Also talk with your provider if youve had a stomach ulcer or gastrointestinal bleeding. Dont give aspirin to anyone younger than 18 years of age who is ill with a fever. It may cause severe liver damage.  · Your appetite may be poor, so a light diet is fine.  · Drink 6 to 8 glasses of fluids every day to make sure you are getting enough fluids. Beverages can include water, sport drinks, sodas without caffeine, juices, tea, or soup. Fluids will help loosen secretions in the lung. This will make it easier for you to cough up the phlegm (sputum). If you also have heart or kidney disease, check with your healthcare provider before you drink extra fluids.  · Take antibiotic medicine prescribed until it is all gone, even if you are feeling better after a few days.  Follow-up care  Follow up with your healthcare provider in the next 2 to 3 days, or as advised. This is to be sure the medicine is helping you get better.  If you are 65 or older, you should get a pneumococcal vaccine and a yearly flu (influenza) shot. You should also get these vaccines if  you have chronic lung disease like asthma, emphysema, or COPD. Recently, a second type of pneumonia vaccine has become available for everyone over 65 years old. This is in addition to the previous vaccine. Ask your provider about this.  When to seek medical advice  Call your healthcare provider right away if any of these occur:  · You dont get better within the first 48 hours of treatment  · Shortness of breath gets worse  · Rapid breathing (more than 25 breaths per minute)  · Coughing up blood  · Chest pain gets worse with breathing  · Fever of 100.4°F (38°C) or higher that doesnt get better with fever medicine  · Weakness, dizziness, or fainting that gets worse  · Thirst or dry mouth that gets worse  · Sinus pain, headache, or a stiff neck  · Chest pain not caused by coughing  Date Last Reviewed: 1/1/2017  © 7703-0221 [employer], [employer]. [address] [place] [address]. All rights reserved. This information is not intended as a substitute for professional medical care. Always follow your healthcare professional's instructions.

## 2020-11-18 NOTE — TELEPHONE ENCOUNTER
Pt submitted VS before 2nd welcome call attempt made. Attempted to contact pt to get consent submitted but no answer. Left voicemail and sent [employer] message. Pt is enrolled in program by submitting VS and symptoms this morning.    Smartphone: yes    [facility]: yes    Program consent: yes    Pulse oximeter status: yes    Verified emergency contacts: yes    Program Overview: Reviewed , no response process, and importance of correct emergency contacts in event that well-being check is warranted. Patient will call [phone] 24/7 to speak with an OnCall nurse, if needed. Pt aware that if Sp02 <94% or if they have any worsening symptoms, they need to go to the emergency department. If they are having a medical emergency, they will call 911. Otherwise, patient will continue to submit data as scheduled. Reviewed importance of wearing mask if self or family members leave the house.     Patient had no further questions.      Reason for Disposition   Caller has cancelled the call before the first contact    Protocols used: NO CONTACT OR DUPLICATE CONTACT CALL-A-OH

## 2020-11-18 NOTE — TELEPHONE ENCOUNTER
Covid-19 surveillance program enrollment call attempted. No contact made, left VM message. Sent [employer] message.     Reason for Disposition   Message left on unidentified voice mail.  Phone number verified.    Additional Information   Negative: Caller is angry or rude (e.g., hangs up, verbally abusive, yelling)   Negative: Caller hangs up   Negative: Caller has already spoken with the PCP and has no further questions.   Negative: Caller has already spoken with another triager and has no further questions.   Negative: Caller has already spoken with another triager or PCP AND has further questions AND triager able to answer questions.   Negative: Busy signal.  First attempt to contact caller.  Follow-up call scheduled within 15 minutes.   Negative: No answer.  First attempt to contact caller.  Follow-up call scheduled within 15 minutes.   Negative: Message left on identified voice mail    Protocols used: NO CONTACT OR DUPLICATE CONTACT CALL-A-

## 2020-11-18 NOTE — TELEPHONE ENCOUNTER
"Patient escalated to Covid Surveillance que for SOB at rest a "2".  Attempted to contact patient twice.  No contact.  Left voice message and My Chart message sent  "

## 2020-11-18 NOTE — TELEPHONE ENCOUNTER
Called and spoke to  and he siad that she was sleeping so no contact. He was told to call us back if she had any problems. Pt had med escalations Reason for Disposition   Message left with person in household    Protocols used: NO CONTACT OR DUPLICATE CONTACT CALL-A-[place]

## 2020-11-19 ENCOUNTER — NURSE TRIAGE (OUTPATIENT)
Dept: ADMINISTRATIVE | Facility: CLINIC | Age: 65
End: 2020-11-19

## 2020-11-19 NOTE — TELEPHONE ENCOUNTER
Covid-19 surveillance program escalation due to no response after 8 am push notification. Follow up call attempted. No contact made. Left VM message with [facility] phone number. Sent [employer] message.     Reason for Disposition   Message left on identified voicemail    Additional Information   Negative: Caller has already spoken with the PCP (or office), and has no further questions   Negative: Caller has already spoken with another triager and has no further questions   Negative: Caller has already spoken with another triager or PCP (or office), and has further questions and triager able to answer questions.   Negative: Busy signal.  First attempt to contact caller.  Follow-up call scheduled within 15 minutes.   Negative: No answer.  First attempt to contact caller.  Follow-up call scheduled within 15 minutes.    Protocols used: NO CONTACT OR DUPLICATE CONTACT CALL-A-[place]

## 2020-11-19 NOTE — TELEPHONE ENCOUNTER
Spoke to  and he said that he will ask if still wants to participate. She will have to put in vitals tomorrow are will no 3 no repsonses. Please let us know if she doesn't want to participate  Reason for Disposition   Message left with person in household.    Protocols used: NO CONTACT OR DUPLICATE CONTACT CALL-A-AH

## 2020-11-20 ENCOUNTER — PATIENT MESSAGE (OUTPATIENT)
Dept: ADMINISTRATIVE | Facility: OTHER | Age: 65
End: 2020-11-20

## 2020-11-20 ENCOUNTER — NURSE TRIAGE (OUTPATIENT)
Dept: ADMINISTRATIVE | Facility: CLINIC | Age: 65
End: 2020-11-20

## 2020-11-20 NOTE — TELEPHONE ENCOUNTER
Pt contacted for Surveillance escalation - SOB 2/2 @rest and w/activity. Denies any fever or SOB, pt able to speak in full sentences without noted SOB or cough. Covid 19 protocol used and pt advised on home care. Nt also instructed pt on cough and deep breath exercises. Pt instructed to call [facility] for worsening of symptoms or health questions.    Reason for Disposition   [1] COVID-19 infection diagnosed or suspected AND [2] mild symptoms (fever, cough) AND [3] no trouble breathing or other complications    Additional Information   Negative: Severe difficulty breathing (e.g., struggling for each breath, speaks in single words)   Negative: Difficult to awaken or acting confused (e.g., disoriented, slurred speech)   Negative: Bluish (or gray) lips or face now   Negative: Shock suspected (e.g., cold/pale/clammy skin, too weak to stand, low BP, rapid pulse)   Negative: Sounds like a life-threatening emergency to the triager   Negative: [1] COVID-19 suspected (e.g., cough, fever, shortness of breath) AND [2] mild symptoms AND [3] public health department recommends testing   Negative: [1] COVID-19 exposure AND [2] no symptoms   Negative: COVID-19 and Breastfeeding, questions about   Negative: SEVERE or constant chest pain (Exception: mild central chest pain, present only when coughing)   Negative: MODERATE difficulty breathing (e.g., speaks in phrases, SOB even at rest, pulse 100-120)   Negative: Patient sounds very sick or weak to the triager   Negative: MILD difficulty breathing (e.g., minimal/no SOB at rest, SOB with walking, pulse <100)   Negative: Chest pain   Negative: Fever > 103 F (39.4 C)   Negative: [1] Fever > 101 F (38.3 C) AND [2] age > 60   Negative: [1] Fever > 100.0 F (37.8 C) AND [2] bedridden (e.g., nursing home patient, CVA, chronic illness, recovering from surgery)   Negative: HIGH RISK patient (e.g., age > 64 years, diabetes, heart or lung disease, weak immune system)   Negative:  Fever present > 3 days (72 hours)   Negative: [1] Fever returns after gone for over 24 hours AND [2] symptoms worse or not improved   Negative: [1] Continuous (nonstop) coughing interferes with work or school AND [2] no improvement using cough treatment per protocol   Negative: Cough present > 3 weeks    Protocols used: CORONAVIRUS (COVID-19) - DIAGNOSED OR [id number]

## 2020-11-21 ENCOUNTER — PATIENT MESSAGE (OUTPATIENT)
Dept: ADMINISTRATIVE | Facility: OTHER | Age: 65
End: 2020-11-21

## 2020-11-22 ENCOUNTER — PATIENT MESSAGE (OUTPATIENT)
Dept: ADMINISTRATIVE | Facility: OTHER | Age: 65
End: 2020-11-22

## 2020-11-23 ENCOUNTER — PATIENT MESSAGE (OUTPATIENT)
Dept: ADMINISTRATIVE | Facility: OTHER | Age: 65
End: 2020-11-23

## 2020-11-24 ENCOUNTER — PATIENT MESSAGE (OUTPATIENT)
Dept: ADMINISTRATIVE | Facility: OTHER | Age: 65
End: 2020-11-24

## 2020-11-24 ENCOUNTER — OFFICE VISIT (OUTPATIENT)
Dept: PULMONOLOGY | Facility: CLINIC | Age: 65
End: 2020-11-24
Payer: MEDICARE

## 2020-11-24 VITALS
OXYGEN SATURATION: 97 % | SYSTOLIC BLOOD PRESSURE: 117 MMHG | WEIGHT: 187.63 LBS | DIASTOLIC BLOOD PRESSURE: 78 MMHG | HEIGHT: 63 IN | HEART RATE: 88 BPM | BODY MASS INDEX: 33.25 KG/M2

## 2020-11-24 DIAGNOSIS — R06.02 SHORTNESS OF BREATH: Primary | ICD-10-CM

## 2020-11-24 DIAGNOSIS — B02.9 HERPES ZOSTER WITHOUT COMPLICATION: ICD-10-CM

## 2020-11-24 DIAGNOSIS — B37.0 CANDIDA INFECTION, ORAL: ICD-10-CM

## 2020-11-24 PROCEDURE — 99999 PR PBB SHADOW E&M-EST. PATIENT-LVL IV: CPT | Mod: PBBFAC,,, | Performed by: NURSE PRACTITIONER

## 2020-11-24 PROCEDURE — 99999 PR PBB SHADOW E&M-EST. PATIENT-LVL IV: ICD-10-PCS | Mod: PBBFAC,,, | Performed by: NURSE PRACTITIONER

## 2020-11-24 PROCEDURE — 99214 OFFICE O/P EST MOD 30 MIN: CPT | Mod: PBBFAC,PO | Performed by: NURSE PRACTITIONER

## 2020-11-24 PROCEDURE — 99213 PR OFFICE/OUTPT VISIT, EST, LEVL III, 20-29 MIN: ICD-10-PCS | Mod: S$PBB,,, | Performed by: NURSE PRACTITIONER

## 2020-11-24 PROCEDURE — 99213 OFFICE O/P EST LOW 20 MIN: CPT | Mod: S$PBB,,, | Performed by: NURSE PRACTITIONER

## 2020-11-24 RX ORDER — NYSTATIN 100000 [USP'U]/ML
4 SUSPENSION ORAL 4 TIMES DAILY
Qty: 160 ML | Refills: 0 | Status: SHIPPED | OUTPATIENT
Start: 2020-11-24 | End: 2020-12-04

## 2020-11-24 RX ORDER — CAPSAICIN 0.75 MG/G
CREAM TOPICAL 3 TIMES DAILY
Qty: 57 G | Refills: 0 | Status: SHIPPED | OUTPATIENT
Start: 2020-11-24 | End: 2021-05-10

## 2020-11-24 RX ORDER — ALBUTEROL SULFATE 90 UG/1
2 AEROSOL, METERED RESPIRATORY (INHALATION) EVERY 6 HOURS PRN
Qty: 18 G | Refills: 2 | Status: SHIPPED | OUTPATIENT
Start: 2020-11-24 | End: 2021-08-04 | Stop reason: SDUPTHER

## 2020-11-24 NOTE — PATIENT INSTRUCTIONS
We discussed options to treat shingles rash    Continue supplemental oxygen during the day as needed and at night    Chest x-ray before next appointment in a month.     Continue to use incentive spirometer daily    Use albuterol inhaler when needed    Oral rinse 4 times a day until mouth sores improve

## 2020-11-24 NOTE — PROGRESS NOTES
2020    Michelle VALENZUELA [facility] Follow Up: Covid 19 pneumonia    Chief Complaint   Patient presents with    Hospital Follow Up       HPI: 2020- new onset rash, onset 5 days. Started with back ache that improved with over the counter aleve and a few red spots on lower back that spread across entire back and around to abdomen on left side. Describes as burning, tingling pins sticking in skin 7 on 0-10 pain scale.     SOB- worse when wearing mask and exertion walking up stairs, wearing supplemental oxygen at 2 L during day as needed.  Associated with chest tightness worse when bending over and fatigue.   No cough,     Dr. Fagan consult:  consult by Dr Fagan:   History of Present Illness:  Pt healthy, lives in [place] with .  Has good function, developed headaches and n/v/d last 10 days , takes in very little.  Voiding minimally.  Pt developed sob ppt er visit.  covid + on  .  Breathing well now on nc ox.  Headache severe, not eating. Plan:   temperature max 101.6, ferritin 458, creatinine 0.7, chest x-ray ground-glass opacity in the lateral left lung-right lung looks clear.   Will give couple liters d5 1/2.   norco for headahce prn   Discussed remdesivir and plasma- kodi late in course but may have some benefit- pt wishes to receive.  sterroids ordered. Discussed bipap/vent if needed.         The chief compliant  problem is new to me  PFSH:  Past Medical History:   Diagnosis Date    Arthralgia of multiple joints 10/1/2020    Diverticulosis of intestine 10/1/2020    Dyslipidemia 10/1/2020    GERD (gastroesophageal reflux disease)     Hypothyroidism          Past Surgical History:   Procedure Laterality Date    APPENDECTOMY      CARPAL TUNNEL RELEASE Right     COLONOSCOPY      EYE SURGERY      cataracts     Social History     Tobacco Use    Smoking status: Former Smoker     Types: Cigarettes     Quit date: 2012     Years since quittin.4    Smokeless tobacco:  "Never Used   Substance Use Topics    Alcohol use: No     Frequency: Never    Drug use: No     Family History   Problem Relation Age of Onset    Arthritis Mother     Hearing loss Mother     Vision loss Mother     Arthritis Father     Colon cancer Paternal Grandmother         >80    Breast cancer Paternal Grandmother      Review of patient's allergies indicates:  No Known Allergies  I have reviewed past medical, family, and social history. I have reviewed previous nurse notes.    Performance Status:The patient's activity level is housebound activities.          Review of Systems   Constitutional: Negative for activity change, appetite change, chills, diaphoresis, fatigue, and unexpected weight change. Positive for fatigue  HENT: Negative for dental problem, postnasal drip, rhinorrhea, sinus pressure, sinus pain, sneezing, trouble swallowing and voice change.  Positive for sore throat and painful sores in mouth.   Respiratory: Negative for apnea, cough, wheezing and stridor.  Positive for chest tightness, shortness of breath,   Cardiovascular: Negative for chest pain, palpitations and leg swelling.   Gastrointestinal: Negative for abdominal distention, abdominal pain, constipation and nausea.   Musculoskeletal: Negative for gait problem, myalgias and neck pain.   Skin: Negative for color change and pallor. bright red   Allergic/Immunologic: Negative for environmental allergies and food allergies.   Neurological: Negative for dizziness, speech difficulty, weakness, light-headedness, numbness and headaches.   Hematological: Negative for adenopathy. Does not bruise/bleed easily.   Psychiatric/Behavioral: Negative for dysphoric mood and sleep disturbance. The patient is not nervous/anxious.           Exam:Comprehensive exam done. /78 (BP Location: Left arm, Patient Position: Sitting, BP Method: Medium (Automatic))   Pulse 88   Ht 5' 2.5" (1.588 m)   Wt 85.1 kg (187 lb 9.8 oz)   SpO2 97% Comment: on room " air at rest  BMI 33.77 kg/m²   Exam included Vitals as listed  Constitutional:  oriented to person, place, and time.  appears well-developed. No distress.   Nose: Nose normal.   Mouth/Throat: Uvula is midline, oropharynx is clear and moist and mucous membranes are normal. No dental caries. No oropharyngeal exudate, posterior oropharyngeal edema, posterior oropharyngeal erythema or tonsillar abscesses.  M[place]atti (M) score 2  Eyes: Pupils are equal, round, and reactive to light.   Neck: No JVD present. No thyromegaly present.   Cardiovascular: Normal rate, regular rhythm and normal heart sounds. Exam reveals no gallop and no friction rub.   No murmur heard.  Pulmonary/Chest: Effort normal and breath sounds inspiratory clear with faint inspiratory sounds. No accessory muscle usage or stridor. No apnea and no tachypnea. No respiratory distress, decreased breath sounds, wheezes, rhonchi, rales, or tenderness.   Abdominal: Soft. exhibits no mass. There is no tenderness. No hepatosplenomegaly, hernias and normoactive bowel sounds  Musculoskeletal: Normal range of motion. exhibits no edema.   Lymphadenopathy:   no cervical adenopathy.   no axillary adenopathy.   Neurological:  alert and oriented to person, place, and time. not disoriented.   Skin: Skin is warm and dry. Capillary refill takes less 2 sec. No cyanosis or erythema. No pallor. Nails show no clubbing.   Psychiatric: normal mood and affect. behavior is normal. Judgment and thought content normal.       Radiographs (ct chest and cxr) reviewed: view by direct vision   X-Ray Chest 1 View  11/13/2020   There is bibasilar atelectasis versus infiltrate new from prior exam.     X-Ray Chest AP Portable 11/11/2020   A peripheral interstitial infiltrate is identified in the left lung but not in the right lung.  This is an unusual pattern but viral pneumonitis is still suspected           Labs reviewed    Lab Results   Component Value Date    WBC 7.14 11/16/2020    RBC  4.82 11/16/2020    HGB 14.1 11/16/2020    HCT 43.3 11/16/2020    MCV 90 11/16/2020    MCH 29.3 11/16/2020    MCHC 32.6 11/16/2020    RDW 13.4 11/16/2020     11/16/2020    MPV 10.2 11/16/2020    GRAN 5.1 11/16/2020    GRAN 71.1 11/16/2020    LYMPH 1.2 11/16/2020    LYMPH 17.4 (L) 11/16/2020    MONO 0.5 11/16/2020    MONO 7.1 11/16/2020    EOS 0.0 11/16/2020    BASO 0.02 11/16/2020    EOSINOPHIL 0.0 11/16/2020    BASOPHIL 0.3 11/16/2020 11/4/20 POC Rapid COVID Negative PositiveAbnormal       PFT was not done  Pulmonary Functions Testing Results:        Plan:  Clinical impression is apparently straight forward and impression with management as below.    Michelle was seen today for hospital follow up.    Diagnoses and all orders for this visit:    Shortness of breath  -     albuterol (PROVENTIL/VENTOLIN HFA) 90 mcg/actuation inhaler; Inhale 2 puffs into the lungs every 6 (six) hours as needed for Wheezing or Shortness of Breath. Rescue    Candida infection, oral  -     nystatin (MYCOSTATIN) 100,000 unit/mL suspension; Take 4 mLs (400,000 Units total) by mouth 4 (four) times daily. for 10 days    Herpes zoster without complication  -     capsicum 0.075% (ZOSTRIX) 0.075 % topical cream; Apply topically 3 (three) times daily.        Follow up in about 4 weeks (around 12/22/2020), or if symptoms worsen or fail to improve.    Discussed with patient above for education the following:      Patient Instructions   We discussed options to treat shingles rash    Continue supplemental oxygen during the day as needed and at night    Chest x-ray before next appointment in a month.     Continue to use incentive spirometer daily    Use albuterol inhaler when needed    Oral rinse 4 times a day until mouth sores improve

## 2020-11-24 NOTE — LETTER
November 24, 2020        No Recipients             [facility]  [address], [address]  [place] [address]  Phone: [phone]  Fax: [phone]   Patient: Michelle Frazier           Date of Visit: 11/24/2020     To whom it concerns,    Mrs. Frazier is seen today by [facility]. She unable to return to work at this time. A follow up appointment is made on 12/28/2020.       Sincerely,          Roseanne Mayo, NP            CC  No Recipients    Enclosure

## 2020-11-25 ENCOUNTER — PATIENT MESSAGE (OUTPATIENT)
Dept: ADMINISTRATIVE | Facility: OTHER | Age: 65
End: 2020-11-25

## 2020-11-26 ENCOUNTER — PATIENT MESSAGE (OUTPATIENT)
Dept: ADMINISTRATIVE | Facility: OTHER | Age: 65
End: 2020-11-26

## 2020-11-26 ENCOUNTER — NURSE TRIAGE (OUTPATIENT)
Dept: ADMINISTRATIVE | Facility: CLINIC | Age: 65
End: 2020-11-26

## 2020-11-27 ENCOUNTER — PATIENT MESSAGE (OUTPATIENT)
Dept: ADMINISTRATIVE | Facility: OTHER | Age: 65
End: 2020-11-27

## 2020-11-28 ENCOUNTER — PATIENT MESSAGE (OUTPATIENT)
Dept: ADMINISTRATIVE | Facility: OTHER | Age: 65
End: 2020-11-28

## 2020-11-28 ENCOUNTER — NURSE TRIAGE (OUTPATIENT)
Dept: ADMINISTRATIVE | Facility: CLINIC | Age: 65
End: 2020-11-28

## 2020-11-29 ENCOUNTER — PATIENT MESSAGE (OUTPATIENT)
Dept: ADMINISTRATIVE | Facility: OTHER | Age: 65
End: 2020-11-29

## 2020-11-30 ENCOUNTER — PATIENT MESSAGE (OUTPATIENT)
Dept: ADMINISTRATIVE | Facility: OTHER | Age: 65
End: 2020-11-30

## 2020-12-01 ENCOUNTER — PATIENT MESSAGE (OUTPATIENT)
Dept: ADMINISTRATIVE | Facility: OTHER | Age: 65
End: 2020-12-01

## 2020-12-02 ENCOUNTER — OFFICE VISIT (OUTPATIENT)
Dept: FAMILY MEDICINE | Facility: CLINIC | Age: 65
End: 2020-12-02
Payer: MEDICARE

## 2020-12-02 VITALS
OXYGEN SATURATION: 97 % | DIASTOLIC BLOOD PRESSURE: 90 MMHG | WEIGHT: 186 LBS | HEIGHT: 63 IN | TEMPERATURE: 98 F | HEART RATE: 96 BPM | SYSTOLIC BLOOD PRESSURE: 156 MMHG | BODY MASS INDEX: 32.96 KG/M2

## 2020-12-02 DIAGNOSIS — U07.1 PNEUMONIA DUE TO COVID-19 VIRUS: Primary | ICD-10-CM

## 2020-12-02 DIAGNOSIS — R03.0 ELEVATED BLOOD-PRESSURE READING WITHOUT DIAGNOSIS OF HYPERTENSION: ICD-10-CM

## 2020-12-02 DIAGNOSIS — J12.82 PNEUMONIA DUE TO COVID-19 VIRUS: Primary | ICD-10-CM

## 2020-12-02 DIAGNOSIS — R60.0 PEDAL EDEMA: ICD-10-CM

## 2020-12-02 PROCEDURE — 99213 OFFICE O/P EST LOW 20 MIN: CPT | Mod: CR,S$GLB,, | Performed by: INTERNAL MEDICINE

## 2020-12-02 PROCEDURE — 99213 PR OFFICE/OUTPT VISIT, EST, LEVL III, 20-29 MIN: ICD-10-PCS | Mod: CR,S$GLB,, | Performed by: INTERNAL MEDICINE

## 2020-12-02 NOTE — PROGRESS NOTES
Subjective:       Patient ID: Michelle Farzier is a 65 y.o. female.    Chief Complaint: Follow-up (hospital followup)    Here for hospital follow up; admitted about 3 weeks ago for COVID pneumonia with discharge 2 weeks ago.  She was treated with remdesivir and convaslescent plasma with improvement.  Discharged with dexamethasone and oxygen.  She has completed the steroids and is using oxygen mostly at night, sometimes during the day depending on activity.  Feels like she is getting stronger daily.  Using IS regularly.  She has had f/u with Pulm and has another appointment scheduled.  She has noted some swelling in ankles/feet in the last week.      Review of Systems   Constitutional: Positive for fatigue. Negative for activity change, appetite change, chills, diaphoresis, fever and unexpected weight change.   HENT: Negative for congestion, ear discharge, ear pain, hearing loss, nosebleeds, postnasal drip, rhinorrhea, sinus pressure, sinus pain, sneezing, sore throat, tinnitus, trouble swallowing and voice change.    Eyes: Negative for photophobia, pain, discharge, redness, itching and visual disturbance.   Respiratory: Positive for shortness of breath. Negative for apnea, cough, choking, chest tightness, wheezing and stridor.    Cardiovascular: Negative for chest pain, palpitations and leg swelling.   Gastrointestinal: Negative for abdominal distention, abdominal pain, anal bleeding, blood in stool, constipation, diarrhea, nausea, rectal pain and vomiting.   Endocrine: Negative for cold intolerance, heat intolerance, polydipsia, polyphagia and polyuria.   Genitourinary: Negative for decreased urine volume, difficulty urinating, dysuria, frequency, genital sores, hematuria and urgency.   Musculoskeletal: Positive for back pain, joint swelling (feet and ankles) and neck pain. Negative for arthralgias, gait problem, myalgias and neck stiffness.   Skin: Positive for rash (shingles). Negative for color change, pallor and  wound.   Allergic/Immunologic: Negative for environmental allergies and food allergies.   Neurological: Negative for dizziness, tremors, seizures, syncope, facial asymmetry, speech difficulty, weakness, light-headedness, numbness and headaches.   Hematological: Negative for adenopathy. Does not bruise/bleed easily.   Psychiatric/Behavioral: Positive for sleep disturbance. Negative for confusion, decreased concentration, dysphoric mood, hallucinations, self-injury and suicidal ideas. The patient is not nervous/anxious.        Past Medical History:   Diagnosis Date    Arthralgia of multiple joints 10/1/2020    Diverticulosis of intestine 10/1/2020    Dyslipidemia 10/1/2020    GERD (gastroesophageal reflux disease)     Hypothyroidism       Past Surgical History:   Procedure Laterality Date    APPENDECTOMY      CARPAL TUNNEL RELEASE Right     COLONOSCOPY      EYE SURGERY      cataracts       Family History   Problem Relation Age of Onset    Arthritis Mother     Hearing loss Mother     Vision loss Mother     Arthritis Father     Colon cancer Paternal Grandmother         >80    Breast cancer Paternal Grandmother        Social History     Socioeconomic History    Marital status:      Spouse name: Not on file    Number of children: Not on file    Years of education: Not on file    Highest education level: Not on file   Occupational History    Not on file   Social Needs    Financial resource strain: Not very hard    Food insecurity     Worry: Never true     Inability: Never true    Transportation needs     Medical: No     Non-medical: No   Tobacco Use    Smoking status: Former Smoker     Types: Cigarettes     Quit date: 2012     Years since quittin.5    Smokeless tobacco: Never Used   Substance and Sexual Activity    Alcohol use: No     Frequency: Never    Drug use: No    Sexual activity: Yes     Partners: Male     Birth control/protection: Post-menopausal     Comment:     Lifestyle    Physical activity     Days per week: 2 days     Minutes per session: 30 min    Stress: To some extent   Relationships    Social connections     Talks on phone: Never     Gets together: Never     Attends Nondenominational service: Not on file     Active member of club or organization: Yes     Attends meetings of clubs or organizations: More than 4 times per year     Relationship status:    Other Topics Concern    Not on file   Social History Narrative    Live with        Current Outpatient Medications   Medication Sig Dispense Refill    albuterol (PROVENTIL/VENTOLIN HFA) 90 mcg/actuation inhaler Inhale 2 puffs into the lungs every 6 (six) hours as needed for Wheezing or Shortness of Breath. Rescue 18 g 2    ascorbic acid, vitamin C, (VITAMIN C) 500 MG tablet Take 1 tablet (500 mg total) by mouth once daily. 30 tablet 0    capsicum 0.075% (ZOSTRIX) 0.075 % topical cream Apply topically 3 (three) times daily. 57 g 0    cetirizine (ZYRTEC) 10 MG tablet Take 10 mg by mouth once daily.      diclofenac sodium (VOLTAREN) 1 % Gel Apply 2 g topically once daily.      ezetimibe (ZETIA) 10 mg tablet Take 1 tablet (10 mg total) by mouth once daily. 90 tablet 1    fluticasone propionate (FLONASE) 50 mcg/actuation nasal spray 2 sprays (100 mcg total) by Each Nostril route daily as needed. 48 g 3    levothyroxine (SYNTHROID) 50 MCG tablet Take 1 tablet (50 mcg total) by mouth once daily. 90 tablet 1    nystatin (MYCOSTATIN) 100,000 unit/mL suspension Take 4 mLs (400,000 Units total) by mouth 4 (four) times daily. for 10 days 160 mL 0    pantoprazole (PROTONIX) 40 MG tablet Take 1 tablet (40 mg total) by mouth once daily. 90 tablet 1    pulse oximeter (PULSE OXIMETER) device by Apply Externally route 2 (two) times a day. Use twice daily at 8 AM and 3 PM and record the value in [facility] as directed. 1 each 0    vitamin D (VITAMIN D3) 1000 units Tab Take 1 tablet (1,000 Units total) by mouth once  "daily. 30 tablet 0     No current facility-administered medications for this visit.        Review of patient's allergies indicates:  No Known Allergies  Objective:    HPI     Follow-up      Additional comments: hospital followup          Last edited by Jia Lara MA on 12/2/2020  3:47 PM. (History)      Blood pressure (!) 156/90, pulse 96, temperature 98.1 °F (36.7 °C), temperature source Temporal, height 5' 2.5" (1.588 m), weight 84.4 kg (186 lb), SpO2 97 %. Body mass index is 33.48 kg/m².   Physical Exam  Vitals signs and nursing note reviewed.   Constitutional:       General: She is not in acute distress.     Appearance: She is well-developed. She is not ill-appearing, toxic-appearing or diaphoretic.   HENT:      Head: Normocephalic and atraumatic.   Eyes:      General: No scleral icterus.        Right eye: No discharge.         Left eye: No discharge.      Conjunctiva/sclera: Conjunctivae normal.   Neck:      Vascular: No carotid bruit.   Cardiovascular:      Rate and Rhythm: Normal rate and regular rhythm.      Heart sounds: Normal heart sounds. No murmur.      Comments: LE varicosities noted  Pulmonary:      Effort: Pulmonary effort is normal. No respiratory distress.      Breath sounds: Normal breath sounds. No decreased breath sounds, wheezing, rhonchi or rales.   Abdominal:      General: There is no distension.      Palpations: Abdomen is soft.      Tenderness: There is no abdominal tenderness. There is no guarding or rebound.   Musculoskeletal:      Right lower leg: Edema (pedal) present.      Left lower leg: Edema (pedal) present.   Skin:     General: Skin is warm and dry.   Neurological:      Mental Status: She is alert.      Motor: No tremor.   Psychiatric:         Mood and Affect: Mood normal.         Speech: Speech normal.         Behavior: Behavior normal.             Assessment:       1. Pneumonia due to COVID-19 virus    2. Pedal edema    3. Elevated blood-pressure reading without diagnosis of " hypertension        Plan:       Michelle was seen today for follow-up.    Diagnoses and all orders for this visit:    Pneumonia due to COVID-19 virus  Comments:  Recovering    Pedal edema  Comments:  Discussed compression socks, elevating feet at rest.     Elevated blood-pressure reading without diagnosis of hypertension  Comments:  Watch salt, caffeine intake.  Recheck next visit

## 2020-12-28 ENCOUNTER — TELEPHONE (OUTPATIENT)
Dept: PULMONOLOGY | Facility: CLINIC | Age: 65
End: 2020-12-28

## 2020-12-28 ENCOUNTER — OFFICE VISIT (OUTPATIENT)
Dept: PULMONOLOGY | Facility: CLINIC | Age: 65
End: 2020-12-28
Payer: MEDICARE

## 2020-12-28 VITALS
OXYGEN SATURATION: 96 % | HEIGHT: 63 IN | DIASTOLIC BLOOD PRESSURE: 94 MMHG | SYSTOLIC BLOOD PRESSURE: 157 MMHG | WEIGHT: 187.81 LBS | BODY MASS INDEX: 33.28 KG/M2 | HEART RATE: 102 BPM

## 2020-12-28 DIAGNOSIS — R06.02 SHORTNESS OF BREATH: ICD-10-CM

## 2020-12-28 DIAGNOSIS — J45.21 MILD INTERMITTENT ASTHMA WITH ACUTE EXACERBATION: ICD-10-CM

## 2020-12-28 DIAGNOSIS — J45.998 POST-VIRAL REACTIVE AIRWAY DISEASE: Primary | ICD-10-CM

## 2020-12-28 PROCEDURE — 99213 PR OFFICE/OUTPT VISIT, EST, LEVL III, 20-29 MIN: ICD-10-PCS | Mod: S$PBB,,, | Performed by: NURSE PRACTITIONER

## 2020-12-28 PROCEDURE — 99213 OFFICE O/P EST LOW 20 MIN: CPT | Mod: S$PBB,,, | Performed by: NURSE PRACTITIONER

## 2020-12-28 PROCEDURE — 99999 PR PBB SHADOW E&M-EST. PATIENT-LVL IV: ICD-10-PCS | Mod: PBBFAC,,, | Performed by: NURSE PRACTITIONER

## 2020-12-28 PROCEDURE — 99214 OFFICE O/P EST MOD 30 MIN: CPT | Mod: PBBFAC,PO | Performed by: NURSE PRACTITIONER

## 2020-12-28 PROCEDURE — 99999 PR PBB SHADOW E&M-EST. PATIENT-LVL IV: CPT | Mod: PBBFAC,,, | Performed by: NURSE PRACTITIONER

## 2020-12-28 NOTE — LETTER
Pt Name: Michelle Frazier  YOB: 1955   Employer: [employer] reference to record [employer]         Doctors comments: Please advise status of patients ability to [id number].  Employee is unable to return to work at this time. Employee will need to be re-evaluated on: Jan 11, 2020      Additional comments:            Provider Signature/Printed Name:Roseanne Mayo NP Date:12/28/2020     [facility]  [address], [address]  [place] [address]

## 2020-12-28 NOTE — PROGRESS NOTES
"12/28/2020    Michelle Frazier      Chief Complaint   Patient presents with    Follow-up     1m f/u " disability paperwork"     Shortness of Breath     more at night when laying down       HPI:   12/28/2020- rash is improved, no longer painful with scabbed blisters.   SOB- recurrent problem, improved during the day, Worse with exertion, has severe SOB when laying down at night. causes severe anxiety gasping for air 2x times in last 4 weeks.  Improved with albuterol rescue inhaler. Using oxygen at night,       11/24/2020- new onset rash, onset 5 days. Started with back ache that improved with over the counter aleve and a few red spots on lower back that spread across entire back and around to abdomen on left side. Describes as burning, tingling pins sticking in skin 7 on 0-10 pain scale.     SOB- worse when wearing mask and exertion walking up stairs, wearing supplemental oxygen at 2 L during day as needed.  Associated with chest tightness worse when bending over and fatigue.   No cough,     Dr. Fagan consult: 11/11 consult by Dr Fagan:   History of Present Illness:  Pt healthy, lives in [place] with .  Has good function, developed headaches and n/v/d last 10 days , takes in very little.  Voiding minimally.  Pt developed sob ppt er visit.  covid + on 11/4 .  Breathing well now on nc ox.  Headache severe, not eating. Plan:  November 11th temperature max 101.6, ferritin 458, creatinine 0.7, chest x-ray ground-glass opacity in the lateral left lung-right lung looks clear.   Will give couple liters d5 1/2.   norco for headahce prn   Discussed remdesivir and plasma- kodi late in course but may have some benefit- pt wishes to receive.  sterroids ordered. Discussed bipap/vent if needed.         The chief compliant  problem is improving  PFSH:  Past Medical History:   Diagnosis Date    Arthralgia of multiple joints 10/1/2020    Diverticulosis of intestine 10/1/2020    Dyslipidemia 10/1/2020    GERD (gastroesophageal " reflux disease)     Hypothyroidism          Past Surgical History:   Procedure Laterality Date    APPENDECTOMY      CARPAL TUNNEL RELEASE Right     COLONOSCOPY      EYE SURGERY      cataracts     Social History     Tobacco Use    Smoking status: Former Smoker     Types: Cigarettes     Quit date: 2012     Years since quittin.5    Smokeless tobacco: Never Used   Substance Use Topics    Alcohol use: No     Frequency: Never    Drug use: No     Family History   Problem Relation Age of Onset    Arthritis Mother     Hearing loss Mother     Vision loss Mother     Arthritis Father     Colon cancer Paternal Grandmother         >80    Breast cancer Paternal Grandmother      Review of patient's allergies indicates:  No Known Allergies  I have reviewed past medical, family, and social history. I have reviewed previous nurse notes.    Performance Status:The patient's activity level is housebound activities.          Review of Systems   Constitutional: Negative for activity change, appetite change, chills, diaphoresis, fatigue, and unexpected weight change. Positive for fatigue  HENT: Negative for dental problem, postnasal drip, rhinorrhea, sinus pressure, sinus pain, sneezing, trouble swallowing and voice change.  Positive for sore throat and painful sores in mouth.   Respiratory: Negative for apnea, cough, wheezing and stridor.  Positive for chest tightness, shortness of breath,   Cardiovascular: Negative for chest pain, palpitations and leg swelling.   Gastrointestinal: Negative for abdominal distention, abdominal pain, constipation and nausea.   Musculoskeletal: Negative for gait problem, myalgias and neck pain.   Skin: Negative for color change and pallor. bright red   Allergic/Immunologic: Negative for environmental allergies and food allergies.   Neurological: Negative for dizziness, speech difficulty, weakness, light-headedness, numbness and headaches.   Hematological: Negative for adenopathy. Does  "not bruise/bleed easily.   Psychiatric/Behavioral: Negative for dysphoric mood and sleep disturbance. The patient is not nervous/anxious.           Exam:Comprehensive exam done. BP (!) 157/94 (BP Location: Right arm, Patient Position: Sitting, BP Method: Medium (Automatic))   Pulse 102   Ht 5' 2.5" (1.588 m)   Wt 85.2 kg (187 lb 13.3 oz)   SpO2 96% Comment: on room air at rest  BMI 33.81 kg/m²   Exam included Vitals as listed  Constitutional:  oriented to person, place, and time.  appears well-developed. No distress.   Nose: Nose normal.   Mouth/Throat: Uvula is midline, oropharynx is clear and moist and mucous membranes are normal. No dental caries. No oropharyngeal exudate, posterior oropharyngeal edema, posterior oropharyngeal erythema or tonsillar abscesses.  Mallapatti (M) score 2  Eyes: Pupils are equal, round, and reactive to light.   Neck: No JVD present. No thyromegaly present.   Cardiovascular: Normal rate, regular rhythm and normal heart sounds. Exam reveals no gallop and no friction rub.   No murmur heard.  Pulmonary/Chest: Effort normal and breath sounds clear. No accessory muscle usage or stridor. No apnea and no tachypnea. No respiratory distress, decreased breath sounds, wheezes, rhonchi, rales, or tenderness.   Abdominal: Soft. exhibits no mass. There is no tenderness. No hepatosplenomegaly, hernias and normoactive bowel sounds  Musculoskeletal: Normal range of motion. exhibits no edema.   Lymphadenopathy:   no cervical adenopathy.   Neurological:  alert and oriented to person, place, and time. not disoriented.   Skin: Skin is warm and dry. Capillary refill takes less 2 sec. No cyanosis or erythema. No pallor. Nails show no clubbing.   Psychiatric: normal mood and affect. behavior is normal. Judgment and thought content normal.       Radiographs (ct chest and cxr) reviewed: view by direct vision   X-Ray Chest 1 View  11/13/2020   There is bibasilar atelectasis versus infiltrate new from prior " exam.     X-Ray Chest AP Portable 11/11/2020   A peripheral interstitial infiltrate is identified in the left lung but not in the right lung.  This is an unusual pattern but viral pneumonitis is still suspected           Labs reviewed    Lab Results   Component Value Date    WBC 7.14 11/16/2020    RBC 4.82 11/16/2020    HGB 14.1 11/16/2020    HCT 43.3 11/16/2020    MCV 90 11/16/2020    MCH 29.3 11/16/2020    MCHC 32.6 11/16/2020    RDW 13.4 11/16/2020     11/16/2020    MPV 10.2 11/16/2020    GRAN 5.1 11/16/2020    GRAN 71.1 11/16/2020    LYMPH 1.2 11/16/2020    LYMPH 17.4 (L) 11/16/2020    MONO 0.5 11/16/2020    MONO 7.1 11/16/2020    EOS 0.0 11/16/2020    BASO 0.02 11/16/2020    EOSINOPHIL 0.0 11/16/2020    BASOPHIL 0.3 11/16/2020 11/4/20 POC Rapid COVID Negative PositiveAbnormal       PFT was not done  Pulmonary Functions Testing Results:        Plan:  Clinical impression is apparently straight forward and impression with management as below.    Michelle was seen today for follow-up and shortness of breath.    Diagnoses and all orders for this visit:    Post-viral reactive airway disease    Mild intermittent asthma with acute exacerbation  -     fluticasone-umeclidin-vilanter (TRELEGY ELLIPTA) 100-62.5-25 mcg DsDv; Inhale 1 puff into the lungs once daily.    Shortness of breath  -     Complete PFT with bronchodilator; Future  -     fluticasone-umeclidin-vilanter (TRELEGY ELLIPTA) 100-62.5-25 mcg DsDv; Inhale 1 puff into the lungs once daily.        Follow up in about 3 months (around 3/28/2021), or if symptoms worsen or fail to improve.    Discussed with patient above for education the following:      Patient Instructions   Starting new medication  Trelegy 100 1 puff once a day every day, rinse mouth after using due to risk for thrush if mouth or tongue has white sores contact clinic    Steroid therapy does cause swelling in hands and feet.    Lung strength test to evaluate lung strength following COVID 19  pneumonia    When ready notify clinic if you wish to stop supplemental oxygen. If lung function test shows damage additional testing is needed for you to keep oxygen. If lung function test is normal will send discontinue order

## 2020-12-28 NOTE — PATIENT INSTRUCTIONS
Starting new medication  Trelegy 100 1 puff once a day every day, rinse mouth after using due to risk for thrush if mouth or tongue has white sores contact clinic    Steroid therapy does cause swelling in hands and feet.    Lung strength test to evaluate lung strength following COVID 19 pneumonia    When ready notify clinic if you wish to stop supplemental oxygen. If lung function test shows damage additional testing is needed for you to keep oxygen. If lung function test is normal will send discontinue order

## 2020-12-29 ENCOUNTER — HOSPITAL ENCOUNTER (OUTPATIENT)
Dept: PULMONOLOGY | Facility: HOSPITAL | Age: 65
Discharge: HOME | End: 2020-12-29
Attending: NURSE PRACTITIONER
Payer: MEDICARE

## 2020-12-29 DIAGNOSIS — R06.02 SHORTNESS OF BREATH: ICD-10-CM

## 2020-12-29 LAB
BRPFT: NORMAL
DLCO ADJ PRE: 16.69 ML/(MIN*MMHG)
DLCO SINGLE BREATH LLN: 14.78
DLCO SINGLE BREATH PRE REF: 81.4 %
DLCO SINGLE BREATH REF: 20.51
DLCOC SBVA LLN: 2.9
DLCOC SBVA PRE REF: 81.5 %
DLCOC SBVA REF: 4.49
DLCOC SINGLE BREATH LLN: 14.78
DLCOC SINGLE BREATH PRE REF: 81.4 %
DLCOC SINGLE BREATH REF: 20.51
DLCOVA LLN: 2.9
DLCOVA PRE REF: 81.5 %
DLCOVA PRE: 3.66 ML/(MIN*MMHG*L)
DLCOVA REF: 4.49
DLVAADJ PRE: 3.66 ML/(MIN*MMHG*L)
ERVN2 LLN: -16449.31
ERVN2 PRE REF: 75.2 %
ERVN2 PRE: 0.52 L
ERVN2 REF: 0.69
FEF 25 75 CHG: 22.8 %
FEF 25 75 LLN: 0.92
FEF 25 75 POST REF: 170.2 %
FEF 25 75 PRE REF: 138.6 %
FEF 25 75 REF: 1.91
FET100 CHG: -39.2 %
FEV1 CHG: -1.8 %
FEV1 FVC CHG: 9.7 %
FEV1 FVC LLN: 66
FEV1 FVC POST REF: 111.4 %
FEV1 FVC PRE REF: 101.6 %
FEV1 FVC REF: 79
FEV1 LLN: 1.59
FEV1 POST REF: 114.9 %
FEV1 PRE REF: 117 %
FEV1 REF: 2.15
FRCN2 LLN: 1.76
FRCN2 PRE REF: 75.3 %
FRCN2 REF: 2.58
FVC CHG: -10.4 %
FVC LLN: 2.03
FVC POST REF: 102.6 %
FVC PRE REF: 114.6 %
FVC REF: 2.74
IVC PRE: 3.08 L
IVC SINGLE BREATH LLN: 2.03
IVC SINGLE BREATH PRE REF: 112.3 %
IVC SINGLE BREATH REF: 2.74
MVV LLN: 66
MVV PRE REF: 64.1 %
MVV REF: 78
PEF CHG: 0.7 %
PEF LLN: 4
PEF POST REF: 144.4 %
PEF PRE REF: 143.4 %
PEF REF: 5.6
POST FEF 25 75: 3.25 L/S
POST FET 100: 4.17 SEC
POST FEV1 FVC: 87.84 %
POST FEV1: 2.47 L
POST FVC: 2.81 L
POST PEF: 8.09 L/S
PRE DLCO: 16.69 ML/(MIN*MMHG)
PRE FEF 25 75: 2.65 L/S
PRE FET 100: 6.86 SEC
PRE FEV1 FVC: 80.1 %
PRE FEV1: 2.52 L
PRE FRC N2: 1.95 L
PRE FVC: 3.14 L
PRE MVV: 50 L/MIN
PRE PEF: 8.03 L/S
RVN2 LLN: 1.32
RVN2 PRE REF: 64.1 %
RVN2 PRE: 1.22 L
RVN2 REF: 1.9
RVN2TLCN2 LLN: 31.81
RVN2TLCN2 PRE REF: 67.5 %
RVN2TLCN2 PRE: 27.93 %
RVN2TLCN2 REF: 41.4
TLCN2 LLN: 3.58
TLCN2 PRE REF: 95.3 %
TLCN2 PRE: 4.36 L
TLCN2 REF: 4.57
VA PRE: 4.56 L
VA SINGLE BREATH LLN: 4.42
VA SINGLE BREATH PRE REF: 103.2 %
VA SINGLE BREATH REF: 4.42
VCMAXN2 LLN: 2.03
VCMAXN2 PRE REF: 114.6 %
VCMAXN2 PRE: 3.14 L
VCMAXN2 REF: 2.74

## 2020-12-29 PROCEDURE — 94727 GAS DIL/WSHOT DETER LNG VOL: CPT | Mod: 26,,, | Performed by: INTERNAL MEDICINE

## 2020-12-29 PROCEDURE — 94729 DIFFUSING CAPACITY: CPT | Mod: 26,,, | Performed by: INTERNAL MEDICINE

## 2020-12-29 PROCEDURE — 94727 GAS DIL/WSHOT DETER LNG VOL: CPT

## 2020-12-29 PROCEDURE — 94727 PR PULM FUNCTION TEST BY GAS: ICD-10-PCS | Mod: 26,,, | Performed by: INTERNAL MEDICINE

## 2020-12-29 PROCEDURE — 94729 DIFFUSING CAPACITY: CPT

## 2020-12-29 PROCEDURE — 94060 EVALUATION OF WHEEZING: CPT

## 2020-12-29 PROCEDURE — 94729 PR C02/MEMBANE DIFFUSE CAPACITY: ICD-10-PCS | Mod: 26,,, | Performed by: INTERNAL MEDICINE

## 2020-12-29 PROCEDURE — 94060 EVALUATION OF WHEEZING: CPT | Mod: 26,,, | Performed by: INTERNAL MEDICINE

## 2020-12-29 PROCEDURE — 94060 PR EVAL OF BRONCHOSPASM: ICD-10-PCS | Mod: 26,,, | Performed by: INTERNAL MEDICINE

## 2021-01-07 ENCOUNTER — TELEPHONE (OUTPATIENT)
Dept: PULMONOLOGY | Facility: CLINIC | Age: 66
End: 2021-01-07

## 2021-01-13 DIAGNOSIS — J45.21 MILD INTERMITTENT ASTHMA WITH ACUTE EXACERBATION: ICD-10-CM

## 2021-01-13 DIAGNOSIS — R06.02 SHORTNESS OF BREATH: ICD-10-CM

## 2021-01-15 DIAGNOSIS — R06.02 SHORTNESS OF BREATH: ICD-10-CM

## 2021-01-15 DIAGNOSIS — J45.21 MILD INTERMITTENT ASTHMA WITH ACUTE EXACERBATION: ICD-10-CM

## 2021-01-18 ENCOUNTER — OFFICE VISIT (OUTPATIENT)
Dept: FAMILY MEDICINE | Facility: CLINIC | Age: 66
End: 2021-01-18
Payer: MEDICARE

## 2021-01-18 VITALS
SYSTOLIC BLOOD PRESSURE: 120 MMHG | HEART RATE: 97 BPM | HEIGHT: 63 IN | WEIGHT: 186 LBS | TEMPERATURE: 99 F | DIASTOLIC BLOOD PRESSURE: 80 MMHG | BODY MASS INDEX: 32.96 KG/M2 | OXYGEN SATURATION: 96 %

## 2021-01-18 DIAGNOSIS — J01.00 ACUTE MAXILLARY SINUSITIS, RECURRENCE NOT SPECIFIED: Primary | ICD-10-CM

## 2021-01-18 PROCEDURE — 99213 PR OFFICE/OUTPT VISIT, EST, LEVL III, 20-29 MIN: ICD-10-PCS | Mod: S$GLB,,, | Performed by: INTERNAL MEDICINE

## 2021-01-18 PROCEDURE — 99213 OFFICE O/P EST LOW 20 MIN: CPT | Mod: S$GLB,,, | Performed by: INTERNAL MEDICINE

## 2021-01-18 RX ORDER — CEFDINIR 300 MG/1
300 CAPSULE ORAL 2 TIMES DAILY
Qty: 20 CAPSULE | Refills: 0 | Status: SHIPPED | OUTPATIENT
Start: 2021-01-18 | End: 2021-01-28

## 2021-04-29 ENCOUNTER — PATIENT MESSAGE (OUTPATIENT)
Dept: RESEARCH | Facility: HOSPITAL | Age: 66
End: 2021-04-29

## 2021-05-04 ENCOUNTER — OFFICE VISIT (OUTPATIENT)
Dept: PULMONOLOGY | Facility: CLINIC | Age: 66
End: 2021-05-04
Payer: MEDICARE

## 2021-05-04 VITALS
BODY MASS INDEX: 32.76 KG/M2 | WEIGHT: 184.88 LBS | HEART RATE: 66 BPM | HEIGHT: 63 IN | DIASTOLIC BLOOD PRESSURE: 87 MMHG | OXYGEN SATURATION: 98 % | SYSTOLIC BLOOD PRESSURE: 143 MMHG

## 2021-05-04 DIAGNOSIS — J45.51 SEVERE PERSISTENT ASTHMA WITH ACUTE EXACERBATION: Primary | ICD-10-CM

## 2021-05-04 DIAGNOSIS — J45.50 SEVERE PERSISTENT ASTHMA WITHOUT COMPLICATION: ICD-10-CM

## 2021-05-04 PROCEDURE — 99214 OFFICE O/P EST MOD 30 MIN: CPT | Mod: PBBFAC,PO | Performed by: NURSE PRACTITIONER

## 2021-05-04 PROCEDURE — 99999 PR PBB SHADOW E&M-EST. PATIENT-LVL IV: CPT | Mod: PBBFAC,,, | Performed by: NURSE PRACTITIONER

## 2021-05-04 PROCEDURE — 99214 OFFICE O/P EST MOD 30 MIN: CPT | Mod: S$PBB,,, | Performed by: NURSE PRACTITIONER

## 2021-05-04 PROCEDURE — 99214 PR OFFICE/OUTPT VISIT, EST, LEVL IV, 30-39 MIN: ICD-10-PCS | Mod: S$PBB,,, | Performed by: NURSE PRACTITIONER

## 2021-05-04 PROCEDURE — 99999 PR PBB SHADOW E&M-EST. PATIENT-LVL IV: ICD-10-PCS | Mod: PBBFAC,,, | Performed by: NURSE PRACTITIONER

## 2021-05-04 RX ORDER — PREDNISONE 20 MG/1
20 TABLET ORAL DAILY
Qty: 9 TABLET | Refills: 1 | Status: SHIPPED | OUTPATIENT
Start: 2021-05-04 | End: 2021-10-03 | Stop reason: SDUPTHER

## 2021-05-04 RX ORDER — DEXAMETHASONE 6 MG/1
TABLET ORAL
COMMUNITY
Start: 2020-11-17 | End: 2021-05-10

## 2021-05-04 RX ORDER — ALBUTEROL SULFATE 90 UG/1
AEROSOL, METERED RESPIRATORY (INHALATION)
COMMUNITY
Start: 2020-11-24 | End: 2022-07-06 | Stop reason: SDUPTHER

## 2021-05-04 RX ORDER — CEFDINIR 300 MG/1
CAPSULE ORAL
COMMUNITY
Start: 2021-01-18 | End: 2021-05-10

## 2021-05-04 RX ORDER — NYSTATIN 100000 [USP'U]/ML
SUSPENSION ORAL
COMMUNITY
Start: 2020-11-24 | End: 2021-05-10

## 2021-05-04 RX ORDER — FLUTICASONE FUROATE, UMECLIDINIUM BROMIDE AND VILANTEROL TRIFENATATE 200; 62.5; 25 UG/1; UG/1; UG/1
1 POWDER RESPIRATORY (INHALATION) DAILY
Qty: 60 EACH | Refills: 11 | Status: SHIPPED | OUTPATIENT
Start: 2021-05-04 | End: 2021-06-02 | Stop reason: SDUPTHER

## 2021-05-04 RX ORDER — AZITHROMYCIN 500 MG/1
500 TABLET, FILM COATED ORAL DAILY
Qty: 3 TABLET | Refills: 1 | Status: SHIPPED | OUTPATIENT
Start: 2021-05-04 | End: 2021-05-07

## 2021-05-04 RX ORDER — AMOXICILLIN AND CLAVULANATE POTASSIUM 500; 125 MG/1; MG/1
TABLET, FILM COATED ORAL
COMMUNITY
Start: 2020-06-17 | End: 2021-05-10

## 2021-05-10 ENCOUNTER — OFFICE VISIT (OUTPATIENT)
Dept: FAMILY MEDICINE | Facility: CLINIC | Age: 66
End: 2021-05-10
Payer: MEDICARE

## 2021-05-10 VITALS
DIASTOLIC BLOOD PRESSURE: 84 MMHG | WEIGHT: 184 LBS | HEIGHT: 63 IN | BODY MASS INDEX: 32.6 KG/M2 | OXYGEN SATURATION: 98 % | HEART RATE: 75 BPM | SYSTOLIC BLOOD PRESSURE: 132 MMHG | TEMPERATURE: 98 F

## 2021-05-10 DIAGNOSIS — H92.01 RIGHT EAR PAIN: ICD-10-CM

## 2021-05-10 DIAGNOSIS — Z86.010 PERSONAL HISTORY OF COLONIC POLYPS: ICD-10-CM

## 2021-05-10 DIAGNOSIS — E78.00 PURE HYPERCHOLESTEROLEMIA: ICD-10-CM

## 2021-05-10 DIAGNOSIS — K21.9 GASTROESOPHAGEAL REFLUX DISEASE, UNSPECIFIED WHETHER ESOPHAGITIS PRESENT: ICD-10-CM

## 2021-05-10 DIAGNOSIS — E03.9 ACQUIRED HYPOTHYROIDISM: Primary | ICD-10-CM

## 2021-05-10 PROBLEM — J96.01 ACUTE HYPOXEMIC RESPIRATORY FAILURE: Status: RESOLVED | Noted: 2020-11-11 | Resolved: 2021-05-10

## 2021-05-10 PROBLEM — U07.1 PNEUMONIA DUE TO COVID-19 VIRUS: Status: RESOLVED | Noted: 2020-11-11 | Resolved: 2021-05-10

## 2021-05-10 PROBLEM — J12.82 PNEUMONIA DUE TO COVID-19 VIRUS: Status: RESOLVED | Noted: 2020-11-11 | Resolved: 2021-05-10

## 2021-05-10 PROCEDURE — 99214 OFFICE O/P EST MOD 30 MIN: CPT | Mod: S$GLB,,, | Performed by: INTERNAL MEDICINE

## 2021-05-10 PROCEDURE — 99214 PR OFFICE/OUTPT VISIT, EST, LEVL IV, 30-39 MIN: ICD-10-PCS | Mod: S$GLB,,, | Performed by: INTERNAL MEDICINE

## 2021-05-10 RX ORDER — LEVOTHYROXINE SODIUM 50 UG/1
50 TABLET ORAL DAILY
Qty: 90 TABLET | Refills: 1 | Status: SHIPPED | OUTPATIENT
Start: 2021-05-10 | End: 2021-10-11 | Stop reason: SDUPTHER

## 2021-05-10 RX ORDER — PANTOPRAZOLE SODIUM 40 MG/1
40 TABLET, DELAYED RELEASE ORAL DAILY
Qty: 90 TABLET | Refills: 1 | Status: SHIPPED | OUTPATIENT
Start: 2021-05-10 | End: 2021-10-11 | Stop reason: SDUPTHER

## 2021-05-10 RX ORDER — EZETIMIBE 10 MG/1
10 TABLET ORAL DAILY
Qty: 90 TABLET | Refills: 1 | Status: SHIPPED | OUTPATIENT
Start: 2021-05-10 | End: 2021-10-11 | Stop reason: SDUPTHER

## 2021-05-11 LAB
ALBUMIN SERPL-MCNC: 4.1 G/DL (ref 3.8–4.8)
ALBUMIN/GLOB SERPL: 2.4 {RATIO} (ref 1.2–2.2)
ALP SERPL-CCNC: 127 IU/L (ref 39–117)
ALT SERPL-CCNC: 12 IU/L (ref 0–32)
AST SERPL-CCNC: 15 IU/L (ref 0–40)
BILIRUB SERPL-MCNC: 0.3 MG/DL (ref 0–1.2)
BUN SERPL-MCNC: 15 MG/DL (ref 8–27)
BUN/CREAT SERPL: 14 (ref 12–28)
CALCIUM SERPL-MCNC: 9.2 MG/DL (ref 8.7–10.3)
CHLORIDE SERPL-SCNC: 104 MMOL/L (ref 96–106)
CHOLEST SERPL-MCNC: 280 MG/DL (ref 100–199)
CO2 SERPL-SCNC: 27 MMOL/L (ref 20–29)
CREAT SERPL-MCNC: 1.04 MG/DL (ref 0.57–1)
GLOBULIN SER CALC-MCNC: 1.7 G/DL (ref 1.5–4.5)
GLUCOSE SERPL-MCNC: 92 MG/DL (ref 65–99)
HDLC SERPL-MCNC: 74 MG/DL
LDLC SERPL CALC-MCNC: 191 MG/DL (ref 0–99)
POTASSIUM SERPL-SCNC: 4.6 MMOL/L (ref 3.5–5.2)
PROT SERPL-MCNC: 5.8 G/DL (ref 6–8.5)
SODIUM SERPL-SCNC: 144 MMOL/L (ref 134–144)
TRIGL SERPL-MCNC: 91 MG/DL (ref 0–149)
TSH SERPL DL<=0.005 MIU/L-ACNC: 2.42 UIU/ML (ref 0.45–4.5)
VLDLC SERPL CALC-MCNC: 15 MG/DL (ref 5–40)

## 2021-06-02 DIAGNOSIS — J45.51 SEVERE PERSISTENT ASTHMA WITH ACUTE EXACERBATION: ICD-10-CM

## 2021-06-02 DIAGNOSIS — J45.50 SEVERE PERSISTENT ASTHMA WITHOUT COMPLICATION: ICD-10-CM

## 2021-06-02 RX ORDER — FLUTICASONE FUROATE, UMECLIDINIUM BROMIDE AND VILANTEROL TRIFENATATE 200; 62.5; 25 UG/1; UG/1; UG/1
1 POWDER RESPIRATORY (INHALATION) DAILY
Qty: 3 EACH | Refills: 3 | Status: SHIPPED | OUTPATIENT
Start: 2021-06-02 | End: 2022-07-06 | Stop reason: SDUPTHER

## 2021-08-04 ENCOUNTER — OFFICE VISIT (OUTPATIENT)
Dept: PULMONOLOGY | Facility: CLINIC | Age: 66
End: 2021-08-04
Payer: MEDICARE

## 2021-08-04 VITALS
WEIGHT: 186.94 LBS | SYSTOLIC BLOOD PRESSURE: 141 MMHG | OXYGEN SATURATION: 99 % | HEIGHT: 63 IN | BODY MASS INDEX: 33.12 KG/M2 | DIASTOLIC BLOOD PRESSURE: 79 MMHG | HEART RATE: 72 BPM

## 2021-08-04 DIAGNOSIS — R06.02 SHORTNESS OF BREATH: Primary | ICD-10-CM

## 2021-08-04 DIAGNOSIS — J45.50 SEVERE PERSISTENT ASTHMA WITHOUT COMPLICATION: ICD-10-CM

## 2021-08-04 PROCEDURE — 99213 PR OFFICE/OUTPT VISIT, EST, LEVL III, 20-29 MIN: ICD-10-PCS | Mod: S$PBB,,, | Performed by: NURSE PRACTITIONER

## 2021-08-04 PROCEDURE — 99999 PR PBB SHADOW E&M-EST. PATIENT-LVL IV: ICD-10-PCS | Mod: PBBFAC,,, | Performed by: NURSE PRACTITIONER

## 2021-08-04 PROCEDURE — 99214 OFFICE O/P EST MOD 30 MIN: CPT | Mod: PBBFAC,PO | Performed by: NURSE PRACTITIONER

## 2021-08-04 PROCEDURE — 99999 PR PBB SHADOW E&M-EST. PATIENT-LVL IV: CPT | Mod: PBBFAC,,, | Performed by: NURSE PRACTITIONER

## 2021-08-04 PROCEDURE — 99213 OFFICE O/P EST LOW 20 MIN: CPT | Mod: S$PBB,,, | Performed by: NURSE PRACTITIONER

## 2021-08-04 RX ORDER — ALBUTEROL SULFATE 90 UG/1
2 AEROSOL, METERED RESPIRATORY (INHALATION) EVERY 4 HOURS PRN
Qty: 54 G | Refills: 3 | Status: SHIPPED | OUTPATIENT
Start: 2021-08-04 | End: 2021-10-11 | Stop reason: SDUPTHER

## 2021-08-04 RX ORDER — CETIRIZINE HYDROCHLORIDE 10 MG/1
CAPSULE, LIQUID FILLED ORAL
COMMUNITY
End: 2022-06-23 | Stop reason: CLARIF

## 2021-08-04 RX ORDER — IBUPROFEN 100 MG/5ML
SUSPENSION, ORAL (FINAL DOSE FORM) ORAL
COMMUNITY
End: 2023-03-16 | Stop reason: ALTCHOICE

## 2021-08-04 RX ORDER — CHOLECALCIFEROL (VITD3)/VIT K2 25-90 MCG
TABLET,DISINTEGRATING ORAL
COMMUNITY
End: 2023-03-16 | Stop reason: ALTCHOICE

## 2021-10-03 DIAGNOSIS — J45.51 SEVERE PERSISTENT ASTHMA WITH ACUTE EXACERBATION: ICD-10-CM

## 2021-10-04 RX ORDER — PREDNISONE 20 MG/1
20 TABLET ORAL DAILY
Qty: 9 TABLET | Refills: 0 | Status: SHIPPED | OUTPATIENT
Start: 2021-10-04 | End: 2022-01-03 | Stop reason: SDUPTHER

## 2021-10-11 ENCOUNTER — OFFICE VISIT (OUTPATIENT)
Dept: FAMILY MEDICINE | Facility: CLINIC | Age: 66
End: 2021-10-11
Payer: MEDICARE

## 2021-10-11 VITALS
HEIGHT: 63 IN | BODY MASS INDEX: 33.13 KG/M2 | TEMPERATURE: 98 F | HEART RATE: 79 BPM | WEIGHT: 187 LBS | SYSTOLIC BLOOD PRESSURE: 134 MMHG | DIASTOLIC BLOOD PRESSURE: 88 MMHG | OXYGEN SATURATION: 98 %

## 2021-10-11 DIAGNOSIS — Z23 NEED FOR PROPHYLACTIC VACCINATION AGAINST STREPTOCOCCUS PNEUMONIAE (PNEUMOCOCCUS) AND INFLUENZA: ICD-10-CM

## 2021-10-11 DIAGNOSIS — Z12.31 BREAST CANCER SCREENING BY MAMMOGRAM: ICD-10-CM

## 2021-10-11 DIAGNOSIS — B35.1 ONYCHOMYCOSIS OF TOENAIL: ICD-10-CM

## 2021-10-11 DIAGNOSIS — E78.00 PURE HYPERCHOLESTEROLEMIA: ICD-10-CM

## 2021-10-11 DIAGNOSIS — E03.9 ACQUIRED HYPOTHYROIDISM: Primary | ICD-10-CM

## 2021-10-11 DIAGNOSIS — K21.9 GASTROESOPHAGEAL REFLUX DISEASE, UNSPECIFIED WHETHER ESOPHAGITIS PRESENT: ICD-10-CM

## 2021-10-11 PROCEDURE — 99214 OFFICE O/P EST MOD 30 MIN: CPT | Mod: 25,S$GLB,, | Performed by: INTERNAL MEDICINE

## 2021-10-11 PROCEDURE — 99214 PR OFFICE/OUTPT VISIT, EST, LEVL IV, 30-39 MIN: ICD-10-PCS | Mod: 25,S$GLB,, | Performed by: INTERNAL MEDICINE

## 2021-10-11 PROCEDURE — 90670 PNEUMOCOCCAL CONJUGATE VACCINE 13-VALENT LESS THAN 5YO & GREATER THAN: ICD-10-PCS | Mod: S$GLB,,, | Performed by: INTERNAL MEDICINE

## 2021-10-11 PROCEDURE — G0009 PNEUMOCOCCAL CONJUGATE VACCINE 13-VALENT LESS THAN 5YO & GREATER THAN: ICD-10-PCS | Mod: S$GLB,,, | Performed by: INTERNAL MEDICINE

## 2021-10-11 PROCEDURE — G0008 FLU VACCINE - QUADRIVALENT - HIGH DOSE (65+) PRESERVATIVE FREE IM: ICD-10-PCS | Mod: S$GLB,,, | Performed by: INTERNAL MEDICINE

## 2021-10-11 PROCEDURE — G0008 ADMIN INFLUENZA VIRUS VAC: HCPCS | Mod: S$GLB,,, | Performed by: INTERNAL MEDICINE

## 2021-10-11 PROCEDURE — G0009 ADMIN PNEUMOCOCCAL VACCINE: HCPCS | Mod: S$GLB,,, | Performed by: INTERNAL MEDICINE

## 2021-10-11 PROCEDURE — 90670 PCV13 VACCINE IM: CPT | Mod: S$GLB,,, | Performed by: INTERNAL MEDICINE

## 2021-10-11 PROCEDURE — 90662 FLU VACCINE - QUADRIVALENT - HIGH DOSE (65+) PRESERVATIVE FREE IM: ICD-10-PCS | Mod: S$GLB,,, | Performed by: INTERNAL MEDICINE

## 2021-10-11 PROCEDURE — 90662 IIV NO PRSV INCREASED AG IM: CPT | Mod: S$GLB,,, | Performed by: INTERNAL MEDICINE

## 2021-10-11 RX ORDER — EZETIMIBE 10 MG/1
10 TABLET ORAL DAILY
Qty: 90 TABLET | Refills: 1 | Status: SHIPPED | OUTPATIENT
Start: 2021-10-11 | End: 2023-04-26

## 2021-10-11 RX ORDER — LEVOTHYROXINE SODIUM 50 UG/1
50 TABLET ORAL DAILY
Qty: 90 TABLET | Refills: 1 | Status: SHIPPED | OUTPATIENT
Start: 2021-10-11 | End: 2022-03-16 | Stop reason: SDUPTHER

## 2021-10-11 RX ORDER — PANTOPRAZOLE SODIUM 40 MG/1
40 TABLET, DELAYED RELEASE ORAL DAILY
Qty: 90 TABLET | Refills: 1 | Status: SHIPPED | OUTPATIENT
Start: 2021-10-11 | End: 2022-03-16 | Stop reason: SDUPTHER

## 2021-10-11 RX ORDER — MULTIVIT WITH MINERALS/HERBS
1 TABLET ORAL DAILY
COMMUNITY
End: 2023-03-16 | Stop reason: ALTCHOICE

## 2021-10-27 ENCOUNTER — HOSPITAL ENCOUNTER (OUTPATIENT)
Dept: RADIOLOGY | Facility: HOSPITAL | Age: 66
Discharge: HOME | End: 2021-10-27
Attending: INTERNAL MEDICINE
Payer: MEDICARE

## 2021-10-27 ENCOUNTER — PATIENT MESSAGE (OUTPATIENT)
Dept: FAMILY MEDICINE | Facility: CLINIC | Age: 66
End: 2021-10-27
Payer: MEDICARE

## 2021-10-27 DIAGNOSIS — Z12.31 BREAST CANCER SCREENING BY MAMMOGRAM: ICD-10-CM

## 2021-10-27 PROCEDURE — 77067 SCR MAMMO BI INCL CAD: CPT | Mod: TC,PO

## 2021-12-31 ENCOUNTER — LAB VISIT (OUTPATIENT)
Dept: PRIMARY CARE CLINIC | Facility: OTHER | Age: 66
End: 2021-12-31
Attending: INTERNAL MEDICINE
Payer: MEDICARE

## 2021-12-31 DIAGNOSIS — Z20.822 ENCOUNTER FOR LABORATORY TESTING FOR COVID-19 VIRUS: ICD-10-CM

## 2021-12-31 PROCEDURE — U0003 INFECTIOUS AGENT DETECTION BY NUCLEIC ACID (DNA OR RNA); SEVERE ACUTE RESPIRATORY SYNDROME CORONAVIRUS 2 (SARS-COV-2) (CORONAVIRUS DISEASE [COVID-19]), AMPLIFIED PROBE TECHNIQUE, MAKING USE OF HIGH THROUGHPUT TECHNOLOGIES AS DESCRIBED BY CMS-2020-01-R: HCPCS | Performed by: INTERNAL MEDICINE

## 2022-01-03 DIAGNOSIS — J45.51 SEVERE PERSISTENT ASTHMA WITH ACUTE EXACERBATION: ICD-10-CM

## 2022-01-04 LAB
SARS-COV-2 RNA RESP QL NAA+PROBE: NOT DETECTED
SARS-COV-2- CYCLE NUMBER: NORMAL

## 2022-01-04 RX ORDER — PREDNISONE 20 MG/1
20 TABLET ORAL DAILY
Qty: 9 TABLET | Refills: 0 | Status: SHIPPED | OUTPATIENT
Start: 2022-01-04 | End: 2022-03-24 | Stop reason: SDUPTHER

## 2022-03-16 ENCOUNTER — OFFICE VISIT (OUTPATIENT)
Dept: FAMILY MEDICINE | Facility: CLINIC | Age: 67
End: 2022-03-16
Payer: MEDICARE

## 2022-03-16 VITALS
HEART RATE: 64 BPM | BODY MASS INDEX: 34.02 KG/M2 | WEIGHT: 192 LBS | HEIGHT: 63 IN | OXYGEN SATURATION: 98 % | TEMPERATURE: 97 F | SYSTOLIC BLOOD PRESSURE: 130 MMHG | DIASTOLIC BLOOD PRESSURE: 84 MMHG

## 2022-03-16 DIAGNOSIS — E78.00 PURE HYPERCHOLESTEROLEMIA: Primary | ICD-10-CM

## 2022-03-16 DIAGNOSIS — K21.9 GASTROESOPHAGEAL REFLUX DISEASE, UNSPECIFIED WHETHER ESOPHAGITIS PRESENT: ICD-10-CM

## 2022-03-16 DIAGNOSIS — E03.9 ACQUIRED HYPOTHYROIDISM: ICD-10-CM

## 2022-03-16 PROCEDURE — 99213 OFFICE O/P EST LOW 20 MIN: CPT | Mod: AQ,S$GLB,, | Performed by: INTERNAL MEDICINE

## 2022-03-16 PROCEDURE — 99213 PR OFFICE/OUTPT VISIT, EST, LEVL III, 20-29 MIN: ICD-10-PCS | Mod: AQ,S$GLB,, | Performed by: INTERNAL MEDICINE

## 2022-03-16 RX ORDER — DIPHENHYDRAMINE HCL 25 MG
25 TABLET ORAL NIGHTLY PRN
COMMUNITY
End: 2022-06-23 | Stop reason: CLARIF

## 2022-03-16 RX ORDER — PANTOPRAZOLE SODIUM 40 MG/1
40 TABLET, DELAYED RELEASE ORAL DAILY
Qty: 90 TABLET | Refills: 1 | Status: SHIPPED | OUTPATIENT
Start: 2022-03-16 | End: 2022-03-30 | Stop reason: SDUPTHER

## 2022-03-16 RX ORDER — LEVOTHYROXINE SODIUM 50 UG/1
50 TABLET ORAL DAILY
Qty: 90 TABLET | Refills: 1 | Status: SHIPPED | OUTPATIENT
Start: 2022-03-16 | End: 2022-03-21

## 2022-03-16 NOTE — PROGRESS NOTES
Subjective:       Patient ID: Michelle Frazier is a 66 y.o. female.    Chief Complaint: Thyroid Problem (5 months follow up) and Hyperlipidemia    Here for routine follow up and med refills.      Thyroid Problem  Presents for follow-up visit. Patient reports no anxiety, cold intolerance, constipation, diaphoresis, diarrhea, fatigue, heat intolerance, menstrual problem, palpitations or tremors. The symptoms have been stable. Her past medical history is significant for hyperlipidemia.     Review of Systems   Constitutional: Negative for activity change, appetite change, chills, diaphoresis, fatigue, fever and unexpected weight change.   HENT: Positive for rhinorrhea, sinus pressure and sinus pain. Negative for congestion, ear discharge, ear pain, hearing loss, mouth sores, nosebleeds, postnasal drip, sneezing, sore throat, tinnitus, trouble swallowing and voice change.    Eyes: Negative for photophobia, pain, discharge, redness, itching and visual disturbance.   Respiratory: Negative for apnea, cough, choking, chest tightness, shortness of breath and wheezing.    Cardiovascular: Negative for chest pain, palpitations and leg swelling.   Gastrointestinal: Negative for abdominal distention, abdominal pain, blood in stool, constipation, diarrhea, nausea and vomiting.   Endocrine: Negative for cold intolerance, heat intolerance, polydipsia and polyuria.   Genitourinary: Negative for decreased urine volume, difficulty urinating, dyspareunia, dysuria, enuresis, frequency, genital sores, hematuria, menstrual problem, pelvic pain, urgency, vaginal bleeding, vaginal discharge and vaginal pain.   Musculoskeletal: Negative for arthralgias, back pain, gait problem, joint swelling, myalgias, neck pain and neck stiffness.   Skin: Negative for rash and wound.   Allergic/Immunologic: Negative for environmental allergies, food allergies and immunocompromised state.   Neurological: Positive for headaches. Negative for dizziness, tremors,  seizures, syncope, facial asymmetry, speech difficulty, weakness, light-headedness and numbness.   Hematological: Negative for adenopathy. Does not bruise/bleed easily.   Psychiatric/Behavioral: Negative for confusion, decreased concentration, dysphoric mood, hallucinations, self-injury, sleep disturbance and suicidal ideas. The patient is not nervous/anxious.        Past Medical History:   Diagnosis Date    Arthralgia of multiple joints 10/1/2020    Diverticulosis of intestine 10/1/2020    Dyslipidemia 10/1/2020    GERD (gastroesophageal reflux disease)     Hypothyroidism       Past Surgical History:   Procedure Laterality Date    APPENDECTOMY      CARPAL TUNNEL RELEASE Right     COLONOSCOPY      EYE SURGERY      cataracts       Family History   Problem Relation Age of Onset    Arthritis Mother     Hearing loss Mother     Vision loss Mother     Arthritis Father     Colon cancer Paternal Grandmother         >80    Breast cancer Paternal Grandmother        Social History     Socioeconomic History    Marital status:    Tobacco Use    Smoking status: Former Smoker     Types: Cigarettes     Quit date: 2012     Years since quittin.8    Smokeless tobacco: Never Used   Substance and Sexual Activity    Alcohol use: No    Drug use: No    Sexual activity: Yes     Partners: Male     Birth control/protection: Post-menopausal     Comment:    Social History Narrative    Live with        Current Outpatient Medications   Medication Sig Dispense Refill    albuterol (PROVENTIL/VENTOLIN HFA) 90 mcg/actuation inhaler       ascorbic acid, vitamin C, (VITAMIN C) 1000 MG tablet Vitamin C      b complex vitamins tablet Take 1 tablet by mouth once daily.      cetirizine (ZYRTEC) 10 mg Cap Zyrtec      diclofenac sodium (VOLTAREN) 1 % Gel Apply 2 g topically once daily.      diphenhydrAMINE (BENADRYL ALLERGY) 25 mg tablet Take 25 mg by mouth nightly as needed for Insomnia.       "ezetimibe (ZETIA) 10 mg tablet Take 1 tablet (10 mg total) by mouth once daily. 90 tablet 1    fluticasone-umeclidin-vilanter (TRELEGY ELLIPTA) 200-62.5-25 mcg inhaler Inhale 1 puff into the lungs once daily. 3 each 3    predniSONE (DELTASONE) 20 MG tablet Take 1 tablet (20 mg total) by mouth once daily. 9 tablet 0    pulse oximeter (PULSE OXIMETER) device by Apply Externally route 2 (two) times a day. Use twice daily at 8 AM and 3 PM and record the value in [facility] as directed. 1 each 0    vitamin D (VITAMIN D3) 1000 units Tab Take 1 tablet (1,000 Units total) by mouth once daily. 30 tablet 0    vitamin D3-vitamin K2 (D3 + K2 DOTS) 1000-90 unit-mcg TbDL D3 + K2 DOTS      zinc sulfate (ZINC-220 ORAL) Take 1 tablet by mouth once daily.      levothyroxine (SYNTHROID) 50 MCG tablet Take 1 tablet (50 mcg total) by mouth once daily. 90 tablet 1    pantoprazole (PROTONIX) 40 MG tablet Take 1 tablet (40 mg total) by mouth once daily. 90 tablet 1     No current facility-administered medications for this visit.       Review of patient's allergies indicates:  No Known Allergies  Objective:    HPI     Thyroid Problem      Additional comments: 5 months follow up          Last edited by Jia Lara MA on 3/16/2022  8:23 AM. (History)      Blood pressure 130/84, pulse 64, temperature 96.6 °F (35.9 °C), temperature source Temporal, height 5' 2.5" (1.588 m), weight 87.1 kg (192 lb), SpO2 98 %. Body mass index is 34.56 kg/m².   Physical Exam        Assessment:       1. Pure hypercholesterolemia    2. Acquired hypothyroidism    3. Gastroesophageal reflux disease, unspecified whether esophagitis present        Plan:       Michelle was seen today for thyroid problem and hyperlipidemia.    Diagnoses and all orders for this visit:    Pure hypercholesterolemia  -     Comprehensive Metabolic Panel; Future  -     Lipid Panel; Future  -     Comprehensive Metabolic Panel  -     Lipid Panel    Acquired hypothyroidism  -     " levothyroxine (SYNTHROID) 50 MCG tablet; Take 1 tablet (50 mcg total) by mouth once daily.  -     TSH; Future  -     TSH    Gastroesophageal reflux disease, unspecified whether esophagitis present  Comments:  Controlled with PPI  Orders:  -     pantoprazole (PROTONIX) 40 MG tablet; Take 1 tablet (40 mg total) by mouth once daily.

## 2022-03-17 PROBLEM — Z78.9 STATIN INTOLERANCE: Status: ACTIVE | Noted: 2022-03-17

## 2022-03-17 LAB
ALBUMIN SERPL-MCNC: 4.3 G/DL (ref 3.8–4.8)
ALBUMIN/GLOB SERPL: 2.3 {RATIO} (ref 1.2–2.2)
ALP SERPL-CCNC: 129 IU/L (ref 44–121)
ALT SERPL-CCNC: 13 IU/L (ref 0–32)
AST SERPL-CCNC: 18 IU/L (ref 0–40)
BILIRUB SERPL-MCNC: 0.4 MG/DL (ref 0–1.2)
BUN SERPL-MCNC: 14 MG/DL (ref 8–27)
BUN/CREAT SERPL: 14 (ref 12–28)
CALCIUM SERPL-MCNC: 9.3 MG/DL (ref 8.7–10.3)
CHLORIDE SERPL-SCNC: 105 MMOL/L (ref 96–106)
CHOLEST SERPL-MCNC: 313 MG/DL (ref 100–199)
CO2 SERPL-SCNC: 23 MMOL/L (ref 20–29)
CREAT SERPL-MCNC: 0.98 MG/DL (ref 0.57–1)
EST. GFR  (NO RACE VARIABLE): 63 ML/MIN/1.73
GLOBULIN SER CALC-MCNC: 1.9 G/DL (ref 1.5–4.5)
GLUCOSE SERPL-MCNC: 99 MG/DL (ref 65–99)
HDLC SERPL-MCNC: 84 MG/DL
LDLC SERPL CALC-MCNC: 221 MG/DL (ref 0–99)
POTASSIUM SERPL-SCNC: 4 MMOL/L (ref 3.5–5.2)
PROT SERPL-MCNC: 6.2 G/DL (ref 6–8.5)
SODIUM SERPL-SCNC: 144 MMOL/L (ref 134–144)
TRIGL SERPL-MCNC: 60 MG/DL (ref 0–149)
TSH SERPL DL<=0.005 MIU/L-ACNC: 2.61 UIU/ML (ref 0.45–4.5)
VLDLC SERPL CALC-MCNC: 8 MG/DL (ref 5–40)

## 2022-03-24 ENCOUNTER — OFFICE VISIT (OUTPATIENT)
Dept: PULMONOLOGY | Facility: CLINIC | Age: 67
End: 2022-03-24
Payer: MEDICARE

## 2022-03-24 ENCOUNTER — LAB VISIT (OUTPATIENT)
Dept: LAB | Facility: HOSPITAL | Age: 67
End: 2022-03-24
Attending: NURSE PRACTITIONER
Payer: MEDICARE

## 2022-03-24 VITALS
BODY MASS INDEX: 33.91 KG/M2 | WEIGHT: 191.38 LBS | OXYGEN SATURATION: 97 % | SYSTOLIC BLOOD PRESSURE: 131 MMHG | DIASTOLIC BLOOD PRESSURE: 69 MMHG | HEART RATE: 71 BPM | HEIGHT: 63 IN

## 2022-03-24 DIAGNOSIS — J45.50 SEVERE PERSISTENT ASTHMA WITHOUT COMPLICATION: Primary | ICD-10-CM

## 2022-03-24 DIAGNOSIS — J45.50 SEVERE PERSISTENT ASTHMA WITHOUT COMPLICATION: ICD-10-CM

## 2022-03-24 DIAGNOSIS — J45.51 SEVERE PERSISTENT ASTHMA WITH ACUTE EXACERBATION: ICD-10-CM

## 2022-03-24 DIAGNOSIS — J45.40 MODERATE PERSISTENT ASTHMA WITHOUT COMPLICATION: ICD-10-CM

## 2022-03-24 LAB
BASOPHILS # BLD AUTO: 0.04 K/UL (ref 0–0.2)
BASOPHILS NFR BLD: 0.8 % (ref 0–1.9)
DIFFERENTIAL METHOD: ABNORMAL
EOSINOPHIL # BLD AUTO: 0.1 K/UL (ref 0–0.5)
EOSINOPHIL NFR BLD: 1 % (ref 0–8)
ERYTHROCYTE [DISTWIDTH] IN BLOOD BY AUTOMATED COUNT: 13.4 % (ref 11.5–14.5)
HCT VFR BLD AUTO: 43.4 % (ref 37–48.5)
HGB BLD-MCNC: 14.2 G/DL (ref 12–16)
IGE SERPL-ACNC: <35 IU/ML (ref 0–100)
IMM GRANULOCYTES # BLD AUTO: 0.02 K/UL (ref 0–0.04)
IMM GRANULOCYTES NFR BLD AUTO: 0.4 % (ref 0–0.5)
LYMPHOCYTES # BLD AUTO: 1.2 K/UL (ref 1–4.8)
LYMPHOCYTES NFR BLD: 24.9 % (ref 18–48)
MCH RBC QN AUTO: 31.1 PG (ref 27–31)
MCHC RBC AUTO-ENTMCNC: 32.7 G/DL (ref 32–36)
MCV RBC AUTO: 95 FL (ref 82–98)
MONOCYTES # BLD AUTO: 0.3 K/UL (ref 0.3–1)
MONOCYTES NFR BLD: 6.4 % (ref 4–15)
NEUTROPHILS # BLD AUTO: 3.3 K/UL (ref 1.8–7.7)
NEUTROPHILS NFR BLD: 66.5 % (ref 38–73)
NRBC BLD-RTO: 0 /100 WBC
PLATELET # BLD AUTO: 218 K/UL (ref 150–450)
PMV BLD AUTO: 9.7 FL (ref 9.2–12.9)
RBC # BLD AUTO: 4.56 M/UL (ref 4–5.4)
WBC # BLD AUTO: 4.97 K/UL (ref 3.9–12.7)

## 2022-03-24 PROCEDURE — 99214 PR OFFICE/OUTPT VISIT, EST, LEVL IV, 30-39 MIN: ICD-10-PCS | Mod: S$PBB,,, | Performed by: NURSE PRACTITIONER

## 2022-03-24 PROCEDURE — 99214 OFFICE O/P EST MOD 30 MIN: CPT | Mod: PBBFAC,PO | Performed by: NURSE PRACTITIONER

## 2022-03-24 PROCEDURE — 99214 OFFICE O/P EST MOD 30 MIN: CPT | Mod: S$PBB,,, | Performed by: NURSE PRACTITIONER

## 2022-03-24 PROCEDURE — 36415 COLL VENOUS BLD VENIPUNCTURE: CPT | Performed by: NURSE PRACTITIONER

## 2022-03-24 PROCEDURE — 99999 PR PBB SHADOW E&M-EST. PATIENT-LVL IV: ICD-10-PCS | Mod: PBBFAC,,, | Performed by: NURSE PRACTITIONER

## 2022-03-24 PROCEDURE — 99999 PR PBB SHADOW E&M-EST. PATIENT-LVL IV: CPT | Mod: PBBFAC,,, | Performed by: NURSE PRACTITIONER

## 2022-03-24 PROCEDURE — 82785 ASSAY OF IGE: CPT | Performed by: NURSE PRACTITIONER

## 2022-03-24 PROCEDURE — 85025 COMPLETE CBC W/AUTO DIFF WBC: CPT | Performed by: NURSE PRACTITIONER

## 2022-03-24 RX ORDER — ALBUTEROL SULFATE 0.83 MG/ML
2.5 SOLUTION RESPIRATORY (INHALATION) EVERY 6 HOURS PRN
Qty: 120 ML | Refills: 5 | Status: SHIPPED | OUTPATIENT
Start: 2022-03-24 | End: 2023-03-24

## 2022-03-24 RX ORDER — PREDNISONE 20 MG/1
TABLET ORAL
Qty: 12 TABLET | Refills: 0 | Status: SHIPPED | OUTPATIENT
Start: 2022-03-24 | End: 2022-03-24 | Stop reason: SDUPTHER

## 2022-03-24 RX ORDER — PREDNISONE 20 MG/1
20 TABLET ORAL DAILY
Qty: 12 TABLET | Refills: 0 | Status: SHIPPED | OUTPATIENT
Start: 2022-03-24 | End: 2022-06-23 | Stop reason: CLARIF

## 2022-03-24 RX ORDER — ALBUTEROL SULFATE 0.83 MG/ML
2.5 SOLUTION RESPIRATORY (INHALATION) EVERY 6 HOURS PRN
Qty: 240 ML | Refills: 5 | Status: SHIPPED | OUTPATIENT
Start: 2022-03-24 | End: 2022-03-24 | Stop reason: SDUPTHER

## 2022-03-24 RX ORDER — ALBUTEROL SULFATE 0.83 MG/ML
2.5 SOLUTION RESPIRATORY (INHALATION) EVERY 6 HOURS PRN
Qty: 120 ML | Refills: 5 | Status: SHIPPED | OUTPATIENT
Start: 2022-03-24 | End: 2022-03-24 | Stop reason: SDUPTHER

## 2022-03-24 NOTE — PROGRESS NOTES
3/24/2022    Michelle Frazier      Chief Complaint   Patient presents with    6m f/u    Asthma       HPI:   3/24/2022- States asthma has improved while on Trelegy 200 daily, noticed her triggers are sudden changes in weather and strong smells.   Complaint of chest tightness- recurrent complaint, 3 times weekly, improves with albuterol rescue, worse when feeling stressed. Associated with wheeze.   Cough- non productive, worse when laying down, nocturnal arousals most nights of week.   Had pneumonia vaccine.     Had to take prednisone for asthma attack last January, felt much better after 3 days of prednisone.   Has asthma attack while showering due to exposure to , considered going to ER so severe, took multiple inhalation of albuterol inhaler to improve.        8/4/2021- States SOB is improved, improves with albuterol rescue inhaler 3x weekly, worse when noxious smells are inhaled such as fresh paint. Used inhaler this morning with chest tightness. On Trelegy with great benefit. Cough- improved, worse when sinus drain. No current complaint of wheeze.     5/4/2021- SOB- currently a daily complaint, worse with exertion, lives with stairs and has to rest after walking up stairs.   Cough- worse at night, worsened in last 2 months, stopped taking Trelegy daily until January 2021. Improves with albuterol rescue having to use 2x daily. States worse when she smells strong odors or gets worse.   Associated with chest tightness and wheeze.     12/28/2020- rash is improved, no longer painful with scabbed blisters.   SOB- recurrent problem, improved during the day, Worse with exertion, has severe SOB when laying down at night. causes severe anxiety gasping for air 2x times in last 4 weeks.  Improved with albuterol rescue inhaler. Using oxygen at night,     11/24/2020- new onset rash, onset 5 days. Started with back ache that improved with over the counter aleve and a few red spots on lower back that spread across entire  back and around to abdomen on left side. Describes as burning, tingling pins sticking in skin 7 on 0-10 pain scale.     SOB- worse when wearing mask and exertion walking up stairs, wearing supplemental oxygen at 2 L during day as needed.  Associated with chest tightness worse when bending over and fatigue.   No cough,     Dr. Fagan consult:  consult by Dr Fagan:   History of Present Illness:  Pt healthy, lives in [place] with .  Has good function, developed headaches and n/v/d last 10 days , takes in very little.  Voiding minimally.  Pt developed sob ppt er visit.  covid + on  .  Breathing well now on nc ox.  Headache severe, not eating. Plan:   temperature max 101.6, ferritin 458, creatinine 0.7, chest x-ray ground-glass opacity in the lateral left lung-right lung looks clear.   Will give couple liters d5 1/2.   norco for headahce prn   Discussed remdesivir and plasma- kodi late in course but may have some benefit- pt wishes to receive.  sterroids ordered. Discussed bipap/vent if needed.         The chief compliant  problem varies with instablilty at time    PFSH:  Past Medical History:   Diagnosis Date    Arthralgia of multiple joints 10/1/2020    Diverticulosis of intestine 10/1/2020    Dyslipidemia 10/1/2020    GERD (gastroesophageal reflux disease)     Hypothyroidism          Past Surgical History:   Procedure Laterality Date    APPENDECTOMY      CARPAL TUNNEL RELEASE Right     COLONOSCOPY      EYE SURGERY      cataracts     Social History     Tobacco Use    Smoking status: Former Smoker     Types: Cigarettes     Quit date: 2012     Years since quittin.8    Smokeless tobacco: Never Used   Substance Use Topics    Alcohol use: No    Drug use: No     Family History   Problem Relation Age of Onset    Arthritis Mother     Hearing loss Mother     Vision loss Mother     Arthritis Father     Colon cancer Paternal Grandmother         >80    Breast cancer Paternal  "Grandmother      Review of patient's allergies indicates:  No Known Allergies  I have reviewed past medical, family, and social history. I have reviewed previous nurse notes.    Performance Status:The patient's activity level is housebound activities.      Review of Systems:  a review of eleven systems covering constitutional, Eye, HEENT, Psych, Respiratory, Cardiac, GI, , Musculoskeletal, Endocrine, Dermatologic was negative except for pertinent findings as listed ABOVE and below: pertinent positive as above, rest is good  Shortness of breath, chest tightness, wheeze.          Exam:Comprehensive exam done. /69 (BP Location: Right arm, Patient Position: Sitting, BP Method: Medium (Automatic))   Pulse 71   Ht 5' 2.5" (1.588 m)   Wt 86.8 kg (191 lb 5.8 oz)   SpO2 97% Comment: on room air at rest  BMI 34.44 kg/m²   Exam included Vitals as listed, and patient's appearance and affect and alertness and mood, oral exam for yeast and hygiene and pharynx lesions and Mallapatti (M) score, neck with inspection for jvd and masses and thyroid abnormalities and lymph nodes (supraclavicular and infraclavicular nodes and axillary also examined and noted if abn), chest exam included symmetry and effort and fremitus and percussion and auscultation, cardiac exam included rhythm and gallops and murmur and rubs and jvd and edema, abdominal exam for mass and hepatosplenomegaly and tenderness and hernias and bowel sounds, Musculoskeletal exam with muscle tone and posture and mobility/gait and  strength, and skin for rashes and cyanosis and pallor and turgor, extremity for clubbing.  Findings were normal except for pertinent findings listed below: M2,         Radiographs (ct chest and cxr) reviewed: view by direct vision   X-Ray Chest 1 View  11/13/2020   There is bibasilar atelectasis versus infiltrate new from prior exam.     X-Ray Chest AP Portable 11/11/2020   A peripheral interstitial infiltrate is identified in the " left lung but not in the right lung.  This is an unusual pattern but viral pneumonitis is still suspected       Labs reviewed    Lab Results   Component Value Date    WBC 7.14 11/16/2020    RBC 4.82 11/16/2020    HGB 14.1 11/16/2020    HCT 43.3 11/16/2020    MCV 90 11/16/2020    MCH 29.3 11/16/2020    MCHC 32.6 11/16/2020    RDW 13.4 11/16/2020     11/16/2020    MPV 10.2 11/16/2020    GRAN 5.1 11/16/2020    GRAN 71.1 11/16/2020    LYMPH 1.2 11/16/2020    LYMPH 17.4 (L) 11/16/2020    MONO 0.5 11/16/2020    MONO 7.1 11/16/2020    EOS 0.0 11/16/2020    BASO 0.02 11/16/2020    EOSINOPHIL 0.0 11/16/2020    BASOPHIL 0.3 11/16/2020 11/4/20 POC Rapid COVID Negative PositiveAbnormal       PFT reviewed  Pulmonary Functions Testing Results:  Spirometry with bronchodilator, lung volume by gas dilution, and diffusion capacity measured December 29, 2020. The FEV1 to FVC ratio was 80% indicating no airflow obstruction was measured by spirometry. The FEV1 was 117% of predicted at 2.5 L. There was    no improvement following bronchodilator. Total lung capacity on lung volume by gas dilution was 95% of predicted and normal. Diffusion also was normal at 81% predicted.Study is normal. Clinical correlation recommended. There was no bronchodilator   response measured.       Plan:  Clinical impression is apparently straight forward and impression with management as below.    Michelle was seen today for 6m f/u and asthma.    Diagnoses and all orders for this visit:    Severe persistent asthma without complication  -     predniSONE (DELTASONE) 20 MG tablet; Take 1 tablet (20 mg total) by mouth once daily. Take one pill a day for three days, repeat for shortness of breath  -     NEBULIZER FOR HOME USE  -     albuterol (PROVENTIL) 2.5 mg /3 mL (0.083 %) nebulizer solution; Take 3 mLs (2.5 mg total) by nebulization every 6 (six) hours as needed for Wheezing or Shortness of Breath. Rescue  -     IGE; Future  -     CBC auto differential;  Future          Follow up in about 3 months (around 6/24/2022), or if symptoms worsen or fail to improve.    Discussed with patient above for education the following:      Patient Instructions   Continue current ASthma regiment    Asthma Action plan    Prednisone 20 mg pills, Take one pill a day for three days, repeat for shortness of breath or wheeze    Albuterol Inhaler 1-2 puffs every 4 hours, for cough or shortness of breath    Use nebulizer every 4 hours as needed.     Have blood test. If you qualify for biologic therapy I will order new medication.

## 2022-03-24 NOTE — PATIENT INSTRUCTIONS
Continue current ASthma regiment    Asthma Action plan    Prednisone 20 mg pills, Take one pill a day for three days, repeat for shortness of breath or wheeze    Albuterol Inhaler 1-2 puffs every 4 hours, for cough or shortness of breath    Use nebulizer every 4 hours as needed.     Have blood test. If you qualify for biologic therapy I will order new medication.

## 2022-03-25 ENCOUNTER — PATIENT MESSAGE (OUTPATIENT)
Dept: PULMONOLOGY | Facility: CLINIC | Age: 67
End: 2022-03-25
Payer: MEDICARE

## 2022-03-30 DIAGNOSIS — K21.9 GASTROESOPHAGEAL REFLUX DISEASE, UNSPECIFIED WHETHER ESOPHAGITIS PRESENT: ICD-10-CM

## 2022-03-30 RX ORDER — PANTOPRAZOLE SODIUM 40 MG/1
40 TABLET, DELAYED RELEASE ORAL DAILY
Qty: 90 TABLET | Refills: 1 | Status: SHIPPED | OUTPATIENT
Start: 2022-03-30 | End: 2023-04-06

## 2022-06-23 ENCOUNTER — ANESTHESIA EVENT (OUTPATIENT)
Dept: SURGERY | Facility: HOSPITAL | Age: 67
End: 2022-06-23
Payer: MEDICARE

## 2022-06-23 ENCOUNTER — ANESTHESIA (OUTPATIENT)
Dept: SURGERY | Facility: HOSPITAL | Age: 67
End: 2022-06-23
Payer: MEDICARE

## 2022-06-23 ENCOUNTER — HOSPITAL ENCOUNTER (OUTPATIENT)
Facility: HOSPITAL | Age: 67
Discharge: HOME | End: 2022-06-24
Attending: EMERGENCY MEDICINE | Admitting: INTERNAL MEDICINE
Payer: MEDICARE

## 2022-06-23 DIAGNOSIS — K81.0 ACUTE CHOLECYSTITIS: Primary | ICD-10-CM

## 2022-06-23 DIAGNOSIS — R07.9 CHEST PAIN: ICD-10-CM

## 2022-06-23 PROBLEM — J45.909 ASTHMA: Status: ACTIVE | Noted: 2022-06-23

## 2022-06-23 LAB
ALBUMIN SERPL BCP-MCNC: 4 G/DL (ref 3.5–5.2)
ALP SERPL-CCNC: 127 U/L (ref 55–135)
ALT SERPL W/O P-5'-P-CCNC: 18 U/L (ref 10–44)
ANION GAP SERPL CALC-SCNC: 10 MMOL/L (ref 8–16)
AST SERPL-CCNC: 19 U/L (ref 10–40)
BASOPHILS # BLD AUTO: 0.06 K/UL (ref 0–0.2)
BASOPHILS NFR BLD: 0.7 % (ref 0–1.9)
BILIRUB SERPL-MCNC: 0.3 MG/DL (ref 0.1–1)
BILIRUB UR QL STRIP: NEGATIVE
BUN SERPL-MCNC: 17 MG/DL (ref 8–23)
CALCIUM SERPL-MCNC: 9.8 MG/DL (ref 8.7–10.5)
CHLORIDE SERPL-SCNC: 108 MMOL/L (ref 95–110)
CLARITY UR: CLEAR
CO2 SERPL-SCNC: 23 MMOL/L (ref 23–29)
COLOR UR: YELLOW
CREAT SERPL-MCNC: 1 MG/DL (ref 0.5–1.4)
DIFFERENTIAL METHOD: ABNORMAL
EOSINOPHIL # BLD AUTO: 0.1 K/UL (ref 0–0.5)
EOSINOPHIL NFR BLD: 0.7 % (ref 0–8)
ERYTHROCYTE [DISTWIDTH] IN BLOOD BY AUTOMATED COUNT: 13.1 % (ref 11.5–14.5)
EST. GFR  (AFRICAN AMERICAN): >60 ML/MIN/1.73 M^2
EST. GFR  (NON AFRICAN AMERICAN): 58 ML/MIN/1.73 M^2
GLUCOSE SERPL-MCNC: 124 MG/DL (ref 70–110)
GLUCOSE UR QL STRIP: NEGATIVE
HCT VFR BLD AUTO: 45.7 % (ref 37–48.5)
HGB BLD-MCNC: 15.3 G/DL (ref 12–16)
HGB UR QL STRIP: ABNORMAL
IMM GRANULOCYTES # BLD AUTO: 0.01 K/UL (ref 0–0.04)
IMM GRANULOCYTES NFR BLD AUTO: 0.1 % (ref 0–0.5)
KETONES UR QL STRIP: NEGATIVE
LEUKOCYTE ESTERASE UR QL STRIP: NEGATIVE
LIPASE SERPL-CCNC: 25 U/L (ref 4–60)
LYMPHOCYTES # BLD AUTO: 1.3 K/UL (ref 1–4.8)
LYMPHOCYTES NFR BLD: 14.7 % (ref 18–48)
MCH RBC QN AUTO: 31.2 PG (ref 27–31)
MCHC RBC AUTO-ENTMCNC: 33.5 G/DL (ref 32–36)
MCV RBC AUTO: 93 FL (ref 82–98)
MONOCYTES # BLD AUTO: 0.3 K/UL (ref 0.3–1)
MONOCYTES NFR BLD: 3.5 % (ref 4–15)
NEUTROPHILS # BLD AUTO: 6.8 K/UL (ref 1.8–7.7)
NEUTROPHILS NFR BLD: 80.3 % (ref 38–73)
NITRITE UR QL STRIP: NEGATIVE
NRBC BLD-RTO: 0 /100 WBC
PH UR STRIP: 6 [PH] (ref 5–8)
PLATELET # BLD AUTO: 202 K/UL (ref 150–450)
PMV BLD AUTO: 10.1 FL (ref 9.2–12.9)
POTASSIUM SERPL-SCNC: 4 MMOL/L (ref 3.5–5.1)
PROT SERPL-MCNC: 6.9 G/DL (ref 6–8.4)
PROT UR QL STRIP: NEGATIVE
RBC # BLD AUTO: 4.9 M/UL (ref 4–5.4)
SODIUM SERPL-SCNC: 141 MMOL/L (ref 136–145)
SP GR UR STRIP: >=1.03 (ref 1–1.03)
URN SPEC COLLECT METH UR: ABNORMAL
UROBILINOGEN UR STRIP-ACNC: NEGATIVE EU/DL
WBC # BLD AUTO: 8.5 K/UL (ref 3.9–12.7)

## 2022-06-23 PROCEDURE — 96376 TX/PRO/DX INJ SAME DRUG ADON: CPT | Mod: 59

## 2022-06-23 PROCEDURE — 94761 N-INVAS EAR/PLS OXIMETRY MLT: CPT

## 2022-06-23 PROCEDURE — 94799 UNLISTED PULMONARY SVC/PX: CPT

## 2022-06-23 PROCEDURE — 36000708 HC OR TIME LEV III 1ST 15 MIN: Performed by: SURGERY

## 2022-06-23 PROCEDURE — 36000709 HC OR TIME LEV III EA ADD 15 MIN: Performed by: SURGERY

## 2022-06-23 PROCEDURE — 81003 URINALYSIS AUTO W/O SCOPE: CPT | Performed by: EMERGENCY MEDICINE

## 2022-06-23 PROCEDURE — 37000008 HC ANESTHESIA 1ST 15 MINUTES: Performed by: SURGERY

## 2022-06-23 PROCEDURE — G0378 HOSPITAL OBSERVATION PER HR: HCPCS

## 2022-06-23 PROCEDURE — 63600175 PHARM REV CODE 636 W HCPCS: Performed by: EMERGENCY MEDICINE

## 2022-06-23 PROCEDURE — D9220A PRA ANESTHESIA: Mod: ANES,,, | Performed by: ANESTHESIOLOGY

## 2022-06-23 PROCEDURE — 63600175 PHARM REV CODE 636 W HCPCS: Performed by: NURSE PRACTITIONER

## 2022-06-23 PROCEDURE — 83690 ASSAY OF LIPASE: CPT | Performed by: EMERGENCY MEDICINE

## 2022-06-23 PROCEDURE — 88304 PR  SURG PATH,LEVEL III: ICD-10-PCS | Mod: 26,,, | Performed by: STUDENT IN AN ORGANIZED HEALTH CARE EDUCATION/TRAINING PROGRAM

## 2022-06-23 PROCEDURE — 36415 COLL VENOUS BLD VENIPUNCTURE: CPT | Performed by: EMERGENCY MEDICINE

## 2022-06-23 PROCEDURE — 63600175 PHARM REV CODE 636 W HCPCS: Performed by: NURSE ANESTHETIST, CERTIFIED REGISTERED

## 2022-06-23 PROCEDURE — D9220A PRA ANESTHESIA: ICD-10-PCS | Mod: ANES,,, | Performed by: ANESTHESIOLOGY

## 2022-06-23 PROCEDURE — 99204 OFFICE O/P NEW MOD 45 MIN: CPT | Mod: 57,,, | Performed by: SURGERY

## 2022-06-23 PROCEDURE — 25000003 PHARM REV CODE 250: Performed by: ANESTHESIOLOGY

## 2022-06-23 PROCEDURE — 25000003 PHARM REV CODE 250: Performed by: NURSE ANESTHETIST, CERTIFIED REGISTERED

## 2022-06-23 PROCEDURE — 25000003 PHARM REV CODE 250: Performed by: NURSE PRACTITIONER

## 2022-06-23 PROCEDURE — 99900103 DSU ONLY-NO CHARGE-INITIAL HR (STAT): Performed by: SURGERY

## 2022-06-23 PROCEDURE — 99285 EMERGENCY DEPT VISIT HI MDM: CPT | Mod: 25

## 2022-06-23 PROCEDURE — 96375 TX/PRO/DX INJ NEW DRUG ADDON: CPT

## 2022-06-23 PROCEDURE — 99900035 HC TECH TIME PER 15 MIN (STAT)

## 2022-06-23 PROCEDURE — 88304 TISSUE EXAM BY PATHOLOGIST: CPT | Mod: 26,,, | Performed by: STUDENT IN AN ORGANIZED HEALTH CARE EDUCATION/TRAINING PROGRAM

## 2022-06-23 PROCEDURE — 37000009 HC ANESTHESIA EA ADD 15 MINS: Performed by: SURGERY

## 2022-06-23 PROCEDURE — 85025 COMPLETE CBC W/AUTO DIFF WBC: CPT | Performed by: EMERGENCY MEDICINE

## 2022-06-23 PROCEDURE — 27201423 OPTIME MED/SURG SUP & DEVICES STERILE SUPPLY: Performed by: SURGERY

## 2022-06-23 PROCEDURE — 99204 PR OFFICE/OUTPT VISIT, NEW, LEVL IV, 45-59 MIN: ICD-10-PCS | Mod: 57,,, | Performed by: SURGERY

## 2022-06-23 PROCEDURE — 80053 COMPREHEN METABOLIC PANEL: CPT | Performed by: EMERGENCY MEDICINE

## 2022-06-23 PROCEDURE — 99900104 DSU ONLY-NO CHARGE-EA ADD'L HR (STAT): Performed by: SURGERY

## 2022-06-23 PROCEDURE — 25000003 PHARM REV CODE 250: Performed by: SURGERY

## 2022-06-23 PROCEDURE — 25000003 PHARM REV CODE 250: Performed by: EMERGENCY MEDICINE

## 2022-06-23 PROCEDURE — D9220A PRA ANESTHESIA: ICD-10-PCS | Mod: CRNA,,, | Performed by: NURSE ANESTHETIST, CERTIFIED REGISTERED

## 2022-06-23 PROCEDURE — 96361 HYDRATE IV INFUSION ADD-ON: CPT

## 2022-06-23 PROCEDURE — D9220A PRA ANESTHESIA: Mod: CRNA,,, | Performed by: NURSE ANESTHETIST, CERTIFIED REGISTERED

## 2022-06-23 PROCEDURE — 88304 TISSUE EXAM BY PATHOLOGIST: CPT | Performed by: STUDENT IN AN ORGANIZED HEALTH CARE EDUCATION/TRAINING PROGRAM

## 2022-06-23 PROCEDURE — 47562 PR LAP,CHOLECYSTECTOMY: ICD-10-PCS | Mod: ,,, | Performed by: SURGERY

## 2022-06-23 PROCEDURE — 71000033 HC RECOVERY, INTIAL HOUR: Performed by: SURGERY

## 2022-06-23 PROCEDURE — 47562 LAPAROSCOPIC CHOLECYSTECTOMY: CPT | Mod: ,,, | Performed by: SURGERY

## 2022-06-23 PROCEDURE — 96365 THER/PROPH/DIAG IV INF INIT: CPT | Mod: 59

## 2022-06-23 RX ORDER — IBUPROFEN 200 MG
600 TABLET ORAL EVERY 8 HOURS
Refills: 0 | COMMUNITY
Start: 2022-06-23 | End: 2022-06-26

## 2022-06-23 RX ORDER — MORPHINE SULFATE 2 MG/ML
6 INJECTION, SOLUTION INTRAMUSCULAR; INTRAVENOUS
Status: COMPLETED | OUTPATIENT
Start: 2022-06-23 | End: 2022-06-23

## 2022-06-23 RX ORDER — ONDANSETRON 2 MG/ML
4 INJECTION INTRAMUSCULAR; INTRAVENOUS DAILY PRN
Status: DISCONTINUED | OUTPATIENT
Start: 2022-06-23 | End: 2022-06-23

## 2022-06-23 RX ORDER — DIPHENHYDRAMINE HYDROCHLORIDE 50 MG/ML
25 INJECTION INTRAMUSCULAR; INTRAVENOUS EVERY 6 HOURS PRN
Status: DISCONTINUED | OUTPATIENT
Start: 2022-06-23 | End: 2022-06-23

## 2022-06-23 RX ORDER — ROCURONIUM BROMIDE 10 MG/ML
INJECTION, SOLUTION INTRAVENOUS
Status: DISCONTINUED | OUTPATIENT
Start: 2022-06-23 | End: 2022-06-23

## 2022-06-23 RX ORDER — MIDAZOLAM HYDROCHLORIDE 1 MG/ML
INJECTION INTRAMUSCULAR; INTRAVENOUS
Status: DISCONTINUED | OUTPATIENT
Start: 2022-06-23 | End: 2022-06-23

## 2022-06-23 RX ORDER — DEXAMETHASONE SODIUM PHOSPHATE 4 MG/ML
INJECTION, SOLUTION INTRA-ARTICULAR; INTRALESIONAL; INTRAMUSCULAR; INTRAVENOUS; SOFT TISSUE
Status: DISCONTINUED | OUTPATIENT
Start: 2022-06-23 | End: 2022-06-23

## 2022-06-23 RX ORDER — PROPOFOL 10 MG/ML
VIAL (ML) INTRAVENOUS
Status: DISCONTINUED | OUTPATIENT
Start: 2022-06-23 | End: 2022-06-23

## 2022-06-23 RX ORDER — HYDROCODONE BITARTRATE AND ACETAMINOPHEN 5; 325 MG/1; MG/1
1 TABLET ORAL EVERY 4 HOURS PRN
Status: DISCONTINUED | OUTPATIENT
Start: 2022-06-23 | End: 2022-06-24 | Stop reason: HOSPADM

## 2022-06-23 RX ORDER — ACETAMINOPHEN 10 MG/ML
INJECTION, SOLUTION INTRAVENOUS
Status: DISCONTINUED | OUTPATIENT
Start: 2022-06-23 | End: 2022-06-23

## 2022-06-23 RX ORDER — ACETAMINOPHEN 325 MG/1
650 TABLET ORAL EVERY 4 HOURS PRN
Status: DISCONTINUED | OUTPATIENT
Start: 2022-06-23 | End: 2022-06-24 | Stop reason: HOSPADM

## 2022-06-23 RX ORDER — LIDOCAINE HCL/PF 100 MG/5ML
SYRINGE (ML) INTRAVENOUS
Status: DISCONTINUED | OUTPATIENT
Start: 2022-06-23 | End: 2022-06-23

## 2022-06-23 RX ORDER — MORPHINE SULFATE 2 MG/ML
2 INJECTION, SOLUTION INTRAMUSCULAR; INTRAVENOUS EVERY 4 HOURS PRN
Status: DISCONTINUED | OUTPATIENT
Start: 2022-06-23 | End: 2022-06-24 | Stop reason: HOSPADM

## 2022-06-23 RX ORDER — BUPIVACAINE HYDROCHLORIDE AND EPINEPHRINE 5; 5 MG/ML; UG/ML
INJECTION, SOLUTION EPIDURAL; INTRACAUDAL; PERINEURAL
Status: DISCONTINUED | OUTPATIENT
Start: 2022-06-23 | End: 2022-06-23 | Stop reason: HOSPADM

## 2022-06-23 RX ORDER — SODIUM CHLORIDE 0.9 % (FLUSH) 0.9 %
10 SYRINGE (ML) INJECTION EVERY 12 HOURS PRN
Status: DISCONTINUED | OUTPATIENT
Start: 2022-06-23 | End: 2022-06-23

## 2022-06-23 RX ORDER — HYDROMORPHONE HYDROCHLORIDE 2 MG/ML
0.2 INJECTION, SOLUTION INTRAMUSCULAR; INTRAVENOUS; SUBCUTANEOUS EVERY 5 MIN PRN
Status: DISCONTINUED | OUTPATIENT
Start: 2022-06-23 | End: 2022-06-23

## 2022-06-23 RX ORDER — SODIUM CHLORIDE 9 MG/ML
INJECTION, SOLUTION INTRAVENOUS CONTINUOUS
Status: DISCONTINUED | OUTPATIENT
Start: 2022-06-23 | End: 2022-06-23

## 2022-06-23 RX ORDER — ONDANSETRON 2 MG/ML
4 INJECTION INTRAMUSCULAR; INTRAVENOUS EVERY 6 HOURS PRN
Status: DISCONTINUED | OUTPATIENT
Start: 2022-06-23 | End: 2022-06-24 | Stop reason: HOSPADM

## 2022-06-23 RX ORDER — NEOSTIGMINE METHYLSULFATE 1 MG/ML
INJECTION, SOLUTION INTRAVENOUS
Status: DISCONTINUED | OUTPATIENT
Start: 2022-06-23 | End: 2022-06-23

## 2022-06-23 RX ORDER — ONDANSETRON 2 MG/ML
4 INJECTION INTRAMUSCULAR; INTRAVENOUS
Status: COMPLETED | OUTPATIENT
Start: 2022-06-23 | End: 2022-06-23

## 2022-06-23 RX ORDER — ONDANSETRON HYDROCHLORIDE 2 MG/ML
INJECTION, SOLUTION INTRAMUSCULAR; INTRAVENOUS
Status: DISCONTINUED | OUTPATIENT
Start: 2022-06-23 | End: 2022-06-23

## 2022-06-23 RX ORDER — MORPHINE SULFATE 4 MG/ML
4 INJECTION, SOLUTION INTRAMUSCULAR; INTRAVENOUS EVERY 4 HOURS PRN
Status: DISCONTINUED | OUTPATIENT
Start: 2022-06-23 | End: 2022-06-23

## 2022-06-23 RX ORDER — LANOLIN ALCOHOL/MO/W.PET/CERES
800 CREAM (GRAM) TOPICAL
Status: DISCONTINUED | OUTPATIENT
Start: 2022-06-23 | End: 2022-06-24 | Stop reason: HOSPADM

## 2022-06-23 RX ORDER — SCOLOPAMINE TRANSDERMAL SYSTEM 1 MG/1
1 PATCH, EXTENDED RELEASE TRANSDERMAL ONCE
Status: DISCONTINUED | OUTPATIENT
Start: 2022-06-23 | End: 2022-06-23

## 2022-06-23 RX ORDER — HYDROCODONE BITARTRATE AND ACETAMINOPHEN 5; 325 MG/1; MG/1
1 TABLET ORAL EVERY 6 HOURS PRN
Qty: 20 TABLET | Refills: 0 | Status: SHIPPED | OUTPATIENT
Start: 2022-06-23 | End: 2023-12-20

## 2022-06-23 RX ORDER — NALOXONE HCL 0.4 MG/ML
0.02 VIAL (ML) INJECTION
Status: DISCONTINUED | OUTPATIENT
Start: 2022-06-23 | End: 2022-06-24 | Stop reason: HOSPADM

## 2022-06-23 RX ORDER — FENTANYL CITRATE 50 UG/ML
INJECTION, SOLUTION INTRAMUSCULAR; INTRAVENOUS
Status: DISCONTINUED | OUTPATIENT
Start: 2022-06-23 | End: 2022-06-23

## 2022-06-23 RX ORDER — SODIUM,POTASSIUM PHOSPHATES 280-250MG
2 POWDER IN PACKET (EA) ORAL
Status: DISCONTINUED | OUTPATIENT
Start: 2022-06-23 | End: 2022-06-24 | Stop reason: HOSPADM

## 2022-06-23 RX ORDER — FENTANYL CITRATE 50 UG/ML
25 INJECTION, SOLUTION INTRAMUSCULAR; INTRAVENOUS EVERY 5 MIN PRN
Status: DISCONTINUED | OUTPATIENT
Start: 2022-06-23 | End: 2022-06-23

## 2022-06-23 RX ORDER — SODIUM CHLORIDE 0.9 % (FLUSH) 0.9 %
3 SYRINGE (ML) INJECTION EVERY 8 HOURS
Status: DISCONTINUED | OUTPATIENT
Start: 2022-06-23 | End: 2022-06-23

## 2022-06-23 RX ORDER — OXYCODONE HYDROCHLORIDE 5 MG/1
5 TABLET ORAL
Status: DISCONTINUED | OUTPATIENT
Start: 2022-06-23 | End: 2022-06-23

## 2022-06-23 RX ORDER — PANTOPRAZOLE SODIUM 40 MG/1
40 TABLET, DELAYED RELEASE ORAL DAILY
Status: DISCONTINUED | OUTPATIENT
Start: 2022-06-23 | End: 2022-06-24 | Stop reason: HOSPADM

## 2022-06-23 RX ORDER — MEPERIDINE HYDROCHLORIDE 50 MG/ML
12.5 INJECTION INTRAMUSCULAR; INTRAVENOUS; SUBCUTANEOUS ONCE AS NEEDED
Status: DISCONTINUED | OUTPATIENT
Start: 2022-06-23 | End: 2022-06-23

## 2022-06-23 RX ORDER — TALC
6 POWDER (GRAM) TOPICAL NIGHTLY PRN
Status: DISCONTINUED | OUTPATIENT
Start: 2022-06-23 | End: 2022-06-24 | Stop reason: HOSPADM

## 2022-06-23 RX ORDER — SUCCINYLCHOLINE CHLORIDE 20 MG/ML
INJECTION INTRAMUSCULAR; INTRAVENOUS
Status: DISCONTINUED | OUTPATIENT
Start: 2022-06-23 | End: 2022-06-23

## 2022-06-23 RX ORDER — LEVOTHYROXINE SODIUM 50 UG/1
50 TABLET ORAL
Status: DISCONTINUED | OUTPATIENT
Start: 2022-06-23 | End: 2022-06-24 | Stop reason: HOSPADM

## 2022-06-23 RX ORDER — SODIUM CHLORIDE 0.9 % (FLUSH) 0.9 %
3 SYRINGE (ML) INJECTION
Status: DISCONTINUED | OUTPATIENT
Start: 2022-06-23 | End: 2022-06-23

## 2022-06-23 RX ADMIN — ONDANSETRON 4 MG: 2 INJECTION, SOLUTION INTRAMUSCULAR; INTRAVENOUS at 03:06

## 2022-06-23 RX ADMIN — ONDANSETRON 4 MG: 2 INJECTION INTRAMUSCULAR; INTRAVENOUS at 08:06

## 2022-06-23 RX ADMIN — MIDAZOLAM HYDROCHLORIDE 2 MG: 1 INJECTION, SOLUTION INTRAMUSCULAR; INTRAVENOUS at 03:06

## 2022-06-23 RX ADMIN — SODIUM CHLORIDE 1000 ML: 0.9 INJECTION, SOLUTION INTRAVENOUS at 09:06

## 2022-06-23 RX ADMIN — PIPERACILLIN SODIUM AND TAZOBACTAM SODIUM 4.5 G: 4; .5 INJECTION, POWDER, LYOPHILIZED, FOR SOLUTION INTRAVENOUS at 03:06

## 2022-06-23 RX ADMIN — FENTANYL CITRATE 50 MCG: 50 INJECTION, SOLUTION INTRAMUSCULAR; INTRAVENOUS at 04:06

## 2022-06-23 RX ADMIN — SCOPALAMINE 1 PATCH: 1 PATCH, EXTENDED RELEASE TRANSDERMAL at 03:06

## 2022-06-23 RX ADMIN — GLYCOPYRROLATE 0.2 MG: 0.2 INJECTION, SOLUTION INTRAMUSCULAR; INTRAVITREAL at 03:06

## 2022-06-23 RX ADMIN — PIPERACILLIN SODIUM AND TAZOBACTAM SODIUM 4.5 G: 4; .5 INJECTION, POWDER, LYOPHILIZED, FOR SOLUTION INTRAVENOUS at 09:06

## 2022-06-23 RX ADMIN — SODIUM CHLORIDE, SODIUM GLUCONATE, SODIUM ACETATE, POTASSIUM CHLORIDE, MAGNESIUM CHLORIDE, SODIUM PHOSPHATE, DIBASIC, AND POTASSIUM PHOSPHATE: .53; .5; .37; .037; .03; .012; .00082 INJECTION, SOLUTION INTRAVENOUS at 03:06

## 2022-06-23 RX ADMIN — PROPOFOL 150 MG: 10 INJECTION, EMULSION INTRAVENOUS at 03:06

## 2022-06-23 RX ADMIN — SUCCINYLCHOLINE CHLORIDE 120 MG: 20 INJECTION, SOLUTION INTRAMUSCULAR; INTRAVENOUS; PARENTERAL at 03:06

## 2022-06-23 RX ADMIN — NEOSTIGMINE METHYLSULFATE 3 MG: 1 INJECTION INTRAVENOUS at 04:06

## 2022-06-23 RX ADMIN — ROCURONIUM BROMIDE 5 MG: 10 INJECTION, SOLUTION INTRAVENOUS at 03:06

## 2022-06-23 RX ADMIN — LIDOCAINE HYDROCHLORIDE 50 MG: 20 INJECTION INTRAVENOUS at 03:06

## 2022-06-23 RX ADMIN — OXYCODONE 5 MG: 5 TABLET ORAL at 05:06

## 2022-06-23 RX ADMIN — MORPHINE SULFATE 4 MG: 4 INJECTION INTRAVENOUS at 11:06

## 2022-06-23 RX ADMIN — FENTANYL CITRATE 50 MCG: 50 INJECTION, SOLUTION INTRAMUSCULAR; INTRAVENOUS at 03:06

## 2022-06-23 RX ADMIN — MORPHINE SULFATE 6 MG: 2 INJECTION, SOLUTION INTRAMUSCULAR; INTRAVENOUS at 08:06

## 2022-06-23 RX ADMIN — ACETAMINOPHEN 1000 MG: 10 INJECTION, SOLUTION INTRAVENOUS at 03:06

## 2022-06-23 RX ADMIN — SODIUM CHLORIDE: 0.9 INJECTION, SOLUTION INTRAVENOUS at 11:06

## 2022-06-23 RX ADMIN — ONDANSETRON 4 MG: 2 INJECTION INTRAMUSCULAR; INTRAVENOUS at 11:06

## 2022-06-23 RX ADMIN — SODIUM CHLORIDE, SODIUM GLUCONATE, SODIUM ACETATE, POTASSIUM CHLORIDE, MAGNESIUM CHLORIDE, SODIUM PHOSPHATE, DIBASIC, AND POTASSIUM PHOSPHATE: .53; .5; .37; .037; .03; .012; .00082 INJECTION, SOLUTION INTRAVENOUS at 04:06

## 2022-06-23 RX ADMIN — GLYCOPYRROLATE 0.4 MG: 0.2 INJECTION, SOLUTION INTRAMUSCULAR; INTRAVITREAL at 04:06

## 2022-06-23 RX ADMIN — DEXAMETHASONE SODIUM PHOSPHATE 4 MG: 4 INJECTION, SOLUTION INTRA-ARTICULAR; INTRALESIONAL; INTRAMUSCULAR; INTRAVENOUS; SOFT TISSUE at 03:06

## 2022-06-23 NOTE — PLAN OF CARE
Patient stable, awake and alert. Met transfer criteria per Anesthesiologist. Pain controlled, denies nausea. VS'S. Dressing clean, dry and intact. IS  transferred with patient. Patient transported to room 416.   Bedside report given to Elena.

## 2022-06-23 NOTE — ED PROVIDER NOTES
Encounter Date: 6/23/2022       History     Chief Complaint   Patient presents with    Abdominal Pain     RUQ started this morning at 0430.     HPI 66-year-old woman presents emergency department for evaluation acute onset of right upper quadrant abdominal pain with associated nausea and vomiting that workup from sleep and has been persistent since approximately 4:30 a.m. this morning.  She denies drinking alcohol last night.  She has a history of appendectomy in the past.  Pain is sharp and stabbing in nature.  No alleviating treatments.  Review of patient's allergies indicates:  No Known Allergies  Past Medical History:   Diagnosis Date    Arthralgia of multiple joints 10/1/2020    Diverticulosis of intestine 10/1/2020    Dyslipidemia 10/1/2020    GERD (gastroesophageal reflux disease)     Hypothyroidism      Past Surgical History:   Procedure Laterality Date    APPENDECTOMY      CARPAL TUNNEL RELEASE Right     COLONOSCOPY      EYE SURGERY      cataracts     Family History   Problem Relation Age of Onset    Arthritis Mother     Hearing loss Mother     Vision loss Mother     Arthritis Father     Colon cancer Paternal Grandmother         >80    Breast cancer Paternal Grandmother      Social History     Tobacco Use    Smoking status: Former Smoker     Types: Cigarettes     Quit date: 5/30/2012     Years since quitting: 10.0    Smokeless tobacco: Never Used   Substance Use Topics    Alcohol use: No    Drug use: No     Review of Systems   Constitutional: Negative for fever.   HENT: Negative for sore throat.    Respiratory: Negative for shortness of breath.    Cardiovascular: Negative for chest pain.   Gastrointestinal: Positive for abdominal pain, nausea and vomiting.   Genitourinary: Negative for dysuria.   Musculoskeletal: Negative for back pain.   Skin: Negative for rash.   Neurological: Negative for weakness.   Hematological: Does not bruise/bleed easily.       Physical Exam     Initial Vitals  [06/23/22 0751]   BP Pulse Resp Temp SpO2   (!) 192/88 86 18 98.2 °F (36.8 °C) 100 %      MAP       --         Physical Exam    Constitutional: Vital signs are normal. She appears well-developed and well-nourished.  Non-toxic appearance. No distress.   HENT:   Head: Normocephalic and atraumatic.   Eyes: EOM are normal. Pupils are equal, round, and reactive to light.   Neck: Neck supple. No JVD present.   Normal range of motion.  Cardiovascular: Normal rate, regular rhythm, normal heart sounds and intact distal pulses. Exam reveals no gallop and no friction rub.    No murmur heard.  Pulmonary/Chest: Breath sounds normal. She has no wheezes. She has no rhonchi. She has no rales.   Abdominal: Abdomen is soft. Bowel sounds are normal. There is abdominal tenderness in the right upper quadrant. There is positive Haley's sign. There is no rebound and no guarding.   Musculoskeletal:         General: Normal range of motion.      Cervical back: Normal range of motion and neck supple. No rigidity.     Neurological: She is alert and oriented to person, place, and time. She has normal strength and normal reflexes. No cranial nerve deficit or sensory deficit. She exhibits normal muscle tone. Coordination normal. GCS eye subscore is 4. GCS verbal subscore is 5. GCS motor subscore is 6.   Skin: Skin is warm and dry.   Psychiatric: She has a normal mood and affect. Her speech is normal and behavior is normal. She is not actively hallucinating.         ED Course   Procedures  Labs Reviewed   CBC W/ AUTO DIFFERENTIAL - Abnormal; Notable for the following components:       Result Value    MCH 31.2 (*)     Gran % 80.3 (*)     Lymph % 14.7 (*)     Mono % 3.5 (*)     All other components within normal limits   COMPREHENSIVE METABOLIC PANEL - Abnormal; Notable for the following components:    Glucose 124 (*)     eGFR if non  58 (*)     All other components within normal limits   URINALYSIS, REFLEX TO URINE CULTURE -  Abnormal; Notable for the following components:    Specific Gravity, UA >=1.030 (*)     Occult Blood UA Trace (*)     All other components within normal limits    Narrative:     Specimen Source->Urine   LIPASE          Imaging Results           US Abdomen Limited (Final result)  Result time 06/23/22 08:50:08    Final result by Wendy Frederick MD (06/23/22 08:50:08)                 Impression:      There are findings concerning for cholecystitis, specifically the sonographer reports a positive Haley's sign, there is cholelithiasis and gallbladder wall thickening.  This report was flagged in Epic as abnormal.  Spurs      Electronically signed by: Wendy Frederick MD  Date:    06/23/2022  Time:    08:50             Narrative:    EXAMINATION:  US ABDOMEN LIMITED    CLINICAL HISTORY:  r/o acute cholecystitis;    TECHNIQUE:  Limited ultrasound of the right upper quadrant of the abdomen (including pancreas, liver, gallbladder, common bile duct, and spleen) was performed.    COMPARISON:  None.    FINDINGS:  Liver: Normal in size, measuring 12 cm. There is increased hepatic echogenicity seen throughout the a patent parenchyma.  No focal hepatic lesions.    Gallbladder: There is cholelithiasis.  The sonographer is reporting a positive Haley's sign.  The gallbladder wall is thickened at 4 mm.    Biliary system: The common duct is not dilated, measuring 3 mm.  No intrahepatic ductal dilatation.    The right kidney measures 8.7 cm in length.    Miscellaneous: No upper abdominal ascites.                                 Medications   fluticasone-umeclidin-vilanter 200-62.5-25 mcg inhaler 1 puff (1 puff Inhalation Not Given 6/23/22 1045)   pantoprazole EC tablet 40 mg (40 mg Oral Not Given 6/23/22 1045)   levothyroxine tablet 50 mcg (50 mcg Oral Not Given 6/23/22 1100)   sodium chloride 0.9% flush 10 mL (has no administration in time range)   naloxone 0.4 mg/mL injection 0.02 mg (has no administration in time range)   0.9%  NaCl  infusion ( Intravenous New Bag 6/23/22 1126)   potassium bicarbonate disintegrating tablet 50 mEq (has no administration in time range)   potassium bicarbonate disintegrating tablet 35 mEq (has no administration in time range)   potassium bicarbonate disintegrating tablet 60 mEq (has no administration in time range)   magnesium oxide tablet 800 mg (has no administration in time range)   magnesium oxide tablet 800 mg (has no administration in time range)   potassium, sodium phosphates 280-160-250 mg packet 2 packet (has no administration in time range)   potassium, sodium phosphates 280-160-250 mg packet 2 packet (has no administration in time range)   potassium, sodium phosphates 280-160-250 mg packet 2 packet (has no administration in time range)   acetaminophen tablet 650 mg (has no administration in time range)   ondansetron injection 4 mg (4 mg Intravenous Given 6/23/22 1127)   melatonin tablet 6 mg (has no administration in time range)   piperacillin-tazobactam 4.5 g in dextrose 5 % 100 mL IVPB (ready to mix system) (has no administration in time range)   morphine injection 4 mg (4 mg Intravenous Given 6/23/22 1126)   HYDROcodone-acetaminophen 5-325 mg per tablet 1 tablet (has no administration in time range)   electrolyte-S (ISOLYTE) ( Intravenous Override Pull 6/23/22 1530)   morphine injection 6 mg (6 mg Intravenous Given 6/23/22 0826)   ondansetron injection 4 mg (4 mg Intravenous Given 6/23/22 0826)   piperacillin-tazobactam 4.5 g in dextrose 5 % 100 mL IVPB (ready to mix system) (0 g Intravenous Stopped 6/23/22 1006)   sodium chloride 0.9% bolus 1,000 mL (0 mLs Intravenous Stopped 6/23/22 1020)     Medical Decision Making:   History:   Old Medical Records: I decided to obtain old medical records.  Initial Assessment:   66-year-old woman who presents emergency department with right upper quadrant abdominal pain since this morning.  She has tenderness in the right upper quadrant with positive Haley sign.   Ultrasound confirms acute cholecystitis.  Discussed with General surgery who will evaluate for cholecystectomy this evening.  Patient is NPO she is started on IV antibiotics.  Discussed Hospital Medicine agrees to admit the patient.                      Clinical Impression:   Final diagnoses:  [K81.0] Acute cholecystitis (Primary)          ED Disposition Condition    Observation               Tomas Romano MD  06/23/22 9689

## 2022-06-23 NOTE — PLAN OF CARE
Surgery    Status post laparoscopic cholecystectomy.  Acute cholecystitis was identified but she had no significant spillage of stones or bile and there was no evidence of necrosis.  Antibiotics will be stopped.  Patient will be given clear liquids to be advanced as tolerated to regular diet per her request.  I have called in postoperative narcotic pain medications and left discharge instructions under the discharged tab in epic.  Should be okay for discharge home tomorrow morning or even possibly this evening if the patient's pain is controlled and she is tolerating a diet.  Will plan for her to follow up in my clinic in 2 weeks.      Please call with any questions or concerns.    Cristian Park MD  General Surgery  [phone]

## 2022-06-23 NOTE — ED NOTES
Patient to room complains of Right sided upper abdominal and rib pain. Patient also complains of diarrhea x2 days.

## 2022-06-23 NOTE — CARE UPDATE
06/23/22 1153   Patient Assessment/Suction   Level of Consciousness (AVPU) alert   PRE-TX-O2   O2 Device (Oxygen Therapy) room air   SpO2 100 %   Pulse Oximetry Type Intermittent   $ Pulse Oximetry - Multiple Charge Pulse Oximetry - Multiple   Inhaler   $ Inhaler Charges Refused  (Refused Breo)     Pt states she takes Trelegy @ home. Offered her Breo, to which she refused. States she will wait to take her own @ home.

## 2022-06-23 NOTE — OP NOTE
DATE OF PROCEDURE: 06/23/2022    PREOPERATIVE DIAGNOSIS:  Acute cholecystitis    POSTOPERATIVE DIAGNOSIS:  Same    PROCEDURE: Laparoscopic cholecystectomy    SURGEON: Cristian Park M.D    ASSISTANT:  None    ANESTHESIA: General endotracheal    ESTIMATED BLOOD LOSS:  Minimal    SPECIMEN: Gallbladder    CONDITION: Stable    COMPLICATIONS: None    FINDINGS:   1. Gallbladder acutely inflamed  2. Critical view of safety achieved after top-down dissection  3. No significant spillage of stones or bile     INDICATIONS: The patient is a 66 y.o. female who presented to the emergency room with acute onset of right upper quadrant abdominal pain.  Imaging revealed acute cholecystitis.  The patient was counseled on their surgical options and desired surgical intervention. The risks of the procedure were described to the patient including pain, infection, bleeding, scarring, wound complications, injury to local structures such as bile duct, liver or intestine, warranting more extensive surgery, retained common bile duct stone or need for further intervention. The patient demonstrated understanding of these risks and a consent form was obtained.    PROCEDURE IN DETAIL: PROCEDURE IN DETAIL: The patient was identified in the Preoperative Unit and taken back to the Operating Room and laid supine on the operating room table. IV antibiotics were administered and general anesthesia was induced without complication. The patient was then prepped and draped in the standard sterile fashion. A timeout was performed in accordance with hospital protocol.  A Veress needle was introduced into the abdominal cavity and insufflation was attached. We had appropriate initial pressures and pneumoperitoneum was achieved. Local anesthetic was injected then a stab incision was sharply made just above the umbilicus. A 5 mm Optiview trocar was introduced into the peritoneal cavity under direct visualization.  We examined the trocar and Veress needle  insertion sites and no obvious injury was identified. An 11 mm trocar was then placed in subxiphoid region under direct visualization after injecting local anesthetic.  Two additional 5 mm trocars were placed in the right mid and right lateral abdomen under direct visualization again after injecting local anesthetic. The gallbladder was identified and found to acutely inflamed with edema.  Do the distension and inflammation were unable to grasp the gallbladder therefore needle decompression was performed.  There is very minimal leakage of bile after needle decompression and no significant spillage occurred.  The gallbladder fundus was then grasped and retracted into the right upper quadrant and the infundibulum retracted laterally. The peritoneum over the infundibulum and cystic structures was then gently stripped until the cystic duct and artery were identified and the critical view of safety was achieved.  The cystic duct however appeared large therefore elected perform a top-down dissection to assure we had adequate dissection.  The gallbladder was dissected off the liver bed fossa using electrocautery.  There was significant edema on the posterior wall.  After this dissection the gallbladder was pedicle lysed.  The cystic artery was doubly clipped then ligated.  With further dissection were able to thin down the cystic duct to assure adequate anatomy.  Were then able to doubly clip the cystic duct and transected.  The gallbladder was placed into an EndoCatch bag and removed through the subxiphoid port. The right upper quadrant was then irrigated and then suctioned. The dissection field was inspected for hemostasis, bile leak and to confirmed clips were in place. Additional local anesthetic was injected around the port sites under direct visualization.  The subxiphoid fascial incision was closed with a 0 Vicryl suture. The abdomen was desufflated and ports removed. Additional local anesthetic was injected and all  the skin incisions were closed using a 4-0 Monocryl subcuticular stitch. Dermabond was then applied. The patient was awakened from general anesthesia without complication and returned to the Postoperative Recovery Unit in stable condition. At the end of the case, sponge, instrument and needle counts were correct on 2 occasions. I was present and scrubbed throughout the entirety of the case.

## 2022-06-23 NOTE — ASSESSMENT & PLAN NOTE
US reviewed  General surgery consulted from ED - plan for cholecystectomy today  Keep NPO  IV zosyn  IV prn pain and antiemetics

## 2022-06-23 NOTE — PROGRESS NOTES
"Pharmacist Renal Dose Adjustment Note    Michelle Frazier is a 66 y.o. female being treated with the medication Zosyn    Patient Data:    Vital Signs (Most Recent):  Temp: 97.4 °F (36.3 °C) (06/23/22 1033)  Pulse: 75 (06/23/22 1033)  Resp: 18 (06/23/22 1033)  BP: (!) 174/81 (06/23/22 1033)  SpO2: 96 % (06/23/22 1033)   Vital Signs (72h Range):  Temp:  [97.4 °F (36.3 °C)-98.2 °F (36.8 °C)]   Pulse:  [57-86]   Resp:  [18]   BP: (163-192)/(71-88)   SpO2:  [96 %-100 %]      Recent Labs   Lab 06/23/22  0811   CREATININE 1.0     Serum creatinine: 1 mg/dL 06/23/22 0811  Estimated creatinine clearance: 54.4 mL/min    Pharmacist Renal Dose Adjustment Note  Patient is on Zosyn 4.5 g IV Q6h over 30 minutes (intermittent infusion). Due to the patient's current diagnosis, zosyn via extended infusion provides better clinical outcomes regarding decreased mortality and decreased length of stay in comparison to intermittent infusion. Zosyn dose will be adjusted to Zosyn 4.5 g IV Q8h over 4 hours. Per pharmacy protocol, Zosyn can be adjusted automatically. Providers can override adjustment by re-ordering intermittent infusion with "dispense as written" in the administration instructions.     Carlos Agrawal  [phone]         "

## 2022-06-23 NOTE — TRANSFER OF CARE
"Anesthesia Transfer of Care Note    Patient: Michelle Frazier    Procedure(s) Performed: Procedure(s) (LRB):  CHOLECYSTECTOMY, LAPAROSCOPIC (N/A)    Patient location: PACU    Anesthesia Type: general    Transport from OR: Transported from OR on 2-3 L/min O2 by NC with adequate spontaneous ventilation    Post pain: adequate analgesia    Post assessment: no apparent anesthetic complications and tolerated procedure well    Post vital signs: stable    Level of consciousness: responds to stimulation and sedated    Nausea/Vomiting: no nausea/vomiting    Complications: none    Transfer of care protocol was followed      Last vitals:   Visit Vitals  BP (!) 148/68 (BP Location: Left arm, Patient Position: Lying)   Pulse (!) 59   Temp 36.6 °C (97.9 °F) (Skin)   Resp 20   Ht 5' 2" (1.575 m)   Wt 81 kg (178 lb 9.2 oz)   SpO2 97%   Breastfeeding No   BMI 32.66 kg/m²     "

## 2022-06-23 NOTE — NURSING
Pt returned to floor after surgery. abd with puncture sites with derma bond noted. No redness or drainage noted. Pt denies pain, tolerating clear liquid tray at present time. Zosyn dose given in surgery

## 2022-06-23 NOTE — PLAN OF CARE
Patient prepared for procedure.  No questions at this time.  Family at bedside.  Belongings left in room.

## 2022-06-23 NOTE — ANESTHESIA PREPROCEDURE EVALUATION
06/23/2022  Michelle Frazier is a 66 y.o., female.      Pre-op Assessment    I have reviewed the Patient Summary Reports.     I have reviewed the Nursing Notes. I have reviewed the NPO Status.   I have reviewed the Medications.     Review of Systems  Anesthesia Hx:  No problems with previous Anesthesia    Social:  Former Smoker    Cardiovascular:   hyperlipidemia    Pulmonary:   Asthma asymptomatic and mild    Renal/:  Renal/ Normal     Hepatic/GI:   GERD, well controlled    Musculoskeletal:   Arthritis     Neurological:   Headaches    Endocrine:   Hypothyroidism        Physical Exam  General: Well nourished, Cooperative, Alert and Oriented    Airway:  Mallampati: I   Mouth Opening: Normal  Neck ROM: Normal ROM        Anesthesia Plan  Type of Anesthesia, risks & benefits discussed:    Anesthesia Type: Gen ETT, Gen Supraglottic Airway, Gen Natural Airway, MAC  Intra-op Monitoring Plan: Standard ASA Monitors  Post Op Pain Control Plan: multimodal analgesia  Induction:  IV  Airway Plan: Direct, Video and Fiberoptic, Post-Induction  Informed Consent: Informed consent signed with the Patient and all parties understand the risks and agree with anesthesia plan.  All questions answered.   ASA Score: 2 Emergent    Ready For Surgery From Anesthesia Perspective.     .

## 2022-06-23 NOTE — HPI
Michelle Frazier is a 66-year-old female with past medical history significant for asthma, hyperlipidemia, hypothyroidism, and GERD presenting today for right upper quadrant abdominal pain.  Patient reports she developed sudden onset right upper quadrant abdominal pain at 4:30 a.m. this morning with associated nausea and vomiting.  She describes the pain as sharp quality, 10/10 intensity, no aggravating factors, relieved after receiving IV pain medication in the ED.  ED workup reveals acute cholecystitis.  General surgery consulted and plans for surgical intervention today.  Patient denies chest pain, shortness of breath, fever, chills,or diarrhea.  Patient reports she experienced similar right upper quadrant abdominal discomfort approximately 4 months ago and she was evaluated by her PCP was told it was possibly her gallbladder however she did not undergo any imaging at that time.  She was told by her PCP for occurred again to present to the ED.  Patient reports she ate barbecue chicken and baked beans for dinner last night.  Patient will be admitted to hospitalist services for further observation and management.

## 2022-06-23 NOTE — ANESTHESIA POSTPROCEDURE EVALUATION
Anesthesia Post Evaluation    Patient: Michelle Frazier    Procedure(s) Performed: Procedure(s) (LRB):  CHOLECYSTECTOMY, LAPAROSCOPIC (N/A)    Final Anesthesia Type: general      Patient location during evaluation: PACU  Patient participation: Yes- Able to Participate  Level of consciousness: awake and alert and oriented  Post-procedure vital signs: reviewed and stable  Pain management: adequate  Airway patency: patent    PONV status at discharge: No PONV  Anesthetic complications: no      Cardiovascular status: blood pressure returned to baseline and stable  Respiratory status: unassisted and spontaneous ventilation  Hydration status: euvolemic  Follow-up not needed.          Vitals Value Taken Time   /58 06/23/22 1701   Temp 36.6 °C (97.8 °F) 06/23/22 1650   Pulse 86 06/23/22 1705   Resp 30 06/23/22 1705   SpO2 100 % 06/23/22 1705   Vitals shown include unvalidated device data.      No case tracking events are documented in the log.      Pain/Belinda Score: Pain Rating Prior to Med Admin: 8 (6/23/2022 11:26 AM)  Belinda Score: 8 (6/23/2022  5:00 PM)

## 2022-06-23 NOTE — H&P
[facility] Medicine  History & Physical    Patient Name: Michelle Frazier  MRN: [id number]  Patient Class: OP- Observation  Admission Date: 6/23/2022  Attending Physician: Ko Louis MD   Primary Care Provider: Matt Beltre Jr, MD         Patient information was obtained from patient, past medical records and ER records.     Subjective:     Principal Problem:Acute cholecystitis    Chief Complaint:   Chief Complaint   Patient presents with    Abdominal Pain     RUQ started this morning at 0430.        HPI: Michelle Frazier is a 66-year-old female with past medical history significant for asthma, hyperlipidemia, hypothyroidism, and GERD presenting today for right upper quadrant abdominal pain.  Patient reports she developed sudden onset right upper quadrant abdominal pain at 4:30 a.m. this morning with associated nausea and vomiting.  She describes the pain as sharp quality, 10/10 intensity, no aggravating factors, relieved after receiving IV pain medication in the ED.  ED workup reveals acute cholecystitis.  General surgery consulted and plans for surgical intervention today.  Patient denies chest pain, shortness of breath, fever, chills,or diarrhea.  Patient reports she experienced similar right upper quadrant abdominal discomfort approximately 4 months ago and she was evaluated by her PCP was told it was possibly her gallbladder however she did not undergo any imaging at that time.  She was told by her PCP for occurred again to present to the ED.  Patient reports she ate barbecue chicken and baked beans for dinner last night.  Patient will be admitted to hospitalist services for further observation and management.        Past Medical History:   Diagnosis Date    Arthralgia of multiple joints 10/1/2020    Diverticulosis of intestine 10/1/2020    Dyslipidemia 10/1/2020    GERD (gastroesophageal reflux disease)     Hypothyroidism        Past Surgical History:   Procedure  Laterality Date    APPENDECTOMY      CARPAL TUNNEL RELEASE Right     COLONOSCOPY      EYE SURGERY      cataracts       Review of patient's allergies indicates:  No Known Allergies    No current facility-administered medications on file prior to encounter.     Current Outpatient Medications on File Prior to Encounter   Medication Sig    albuterol (PROVENTIL) 2.5 mg /3 mL (0.083 %) nebulizer solution Take 3 mLs (2.5 mg total) by nebulization every 6 (six) hours as needed for Wheezing or Shortness of Breath. Rescue    albuterol (PROVENTIL/VENTOLIN HFA) 90 mcg/actuation inhaler     ascorbic acid, vitamin C, (VITAMIN C) 1000 MG tablet Vitamin C    b complex vitamins tablet Take 1 tablet by mouth once daily.    fluticasone-umeclidin-vilanter (TRELEGY ELLIPTA) 200-62.5-25 mcg inhaler Inhale 1 puff into the lungs once daily.    pantoprazole (PROTONIX) 40 MG tablet Take 1 tablet (40 mg total) by mouth once daily.    SYNTHROID 50 mcg tablet TAKE 1 TABLET DAILY    vitamin D (VITAMIN D3) 1000 units Tab Take 1 tablet (1,000 Units total) by mouth once daily.    vitamin D3-vitamin K2 (D3 + K2 DOTS) 1000-90 unit-mcg TbDL D3 + K2 DOTS    zinc sulfate (ZINC-220 ORAL) Take 1 tablet by mouth once daily.    [DISCONTINUED] diphenhydrAMINE (SOMINEX) 25 mg tablet Take 25 mg by mouth nightly as needed for Insomnia.    diclofenac sodium (VOLTAREN) 1 % Gel Apply 2 g topically once daily.    ezetimibe (ZETIA) 10 mg tablet Take 1 tablet (10 mg total) by mouth once daily.    [DISCONTINUED] cetirizine (ZYRTEC) 10 mg Cap Zyrtec    [DISCONTINUED] predniSONE (DELTASONE) 20 MG tablet Take 1 tablet (20 mg total) by mouth once daily. Take one pill a day for three days, repeat for shortness of breath    [DISCONTINUED] pulse oximeter (PULSE OXIMETER) device by Apply Externally route 2 (two) times a day. Use twice daily at 8 AM and 3 PM and record the value in MyChart as directed.     Family History       Problem Relation (Age of  Onset)    Arthritis Mother, Father    Breast cancer Paternal Grandmother    Colon cancer Paternal Grandmother    Hearing loss Mother    Vision loss Mother          Tobacco Use    Smoking status: Former Smoker     Types: Cigarettes     Quit date: 5/30/2012     Years since quitting: 10.0    Smokeless tobacco: Never Used   Substance and Sexual Activity    Alcohol use: No    Drug use: No    Sexual activity: Yes     Partners: Male     Birth control/protection: Post-menopausal     Comment:      Review of Systems   Constitutional:  Negative for chills, fatigue and fever.   HENT:  Negative for congestion, sore throat and trouble swallowing.    Eyes:  Negative for photophobia and visual disturbance.   Respiratory:  Negative for cough, chest tightness and shortness of breath.    Cardiovascular:  Negative for chest pain.   Gastrointestinal:  Positive for abdominal pain, nausea and vomiting. Negative for diarrhea.   Genitourinary:  Negative for difficulty urinating, dysuria and urgency.   Musculoskeletal:  Negative for arthralgias, back pain and gait problem.   Skin:  Negative for color change, pallor, rash and wound.   Neurological:  Negative for dizziness, weakness and light-headedness.   Psychiatric/Behavioral:  Negative for agitation, behavioral problems and confusion.    All other systems reviewed and are negative.  Objective:     Vital Signs (Most Recent):  Temp: 98.3 °F (36.8 °C) (06/23/22 1211)  Pulse: (!) 57 (06/23/22 1211)  Resp: 15 (06/23/22 1211)  BP: (!) 140/66 (06/23/22 1211)  SpO2: (!) 91 % (06/23/22 1211)   Vital Signs (24h Range):  Temp:  [97.4 °F (36.3 °C)-98.3 °F (36.8 °C)] 98.3 °F (36.8 °C)  Pulse:  [57-86] 57  Resp:  [15-18] 15  SpO2:  [91 %-100 %] 91 %  BP: (140-192)/(66-88) 140/66     Weight: 81 kg (178 lb 9.2 oz)  Body mass index is 32.66 kg/m².    Physical Exam  Vitals and nursing note reviewed.   Constitutional:       General: She is not in acute distress.     Appearance: Normal appearance.  She is well-developed. She is not ill-appearing or diaphoretic.   HENT:      Head: Normocephalic and atraumatic.      Right Ear: External ear normal.      Left Ear: External ear normal.      Nose: Nose normal. No congestion or rhinorrhea.      Mouth/Throat:      Mouth: Mucous membranes are moist.      Pharynx: Oropharynx is clear. No oropharyngeal exudate or posterior oropharyngeal erythema.   Eyes:      General: No scleral icterus.     Conjunctiva/sclera: Conjunctivae normal.      Pupils: Pupils are equal, round, and reactive to light.   Neck:      Vascular: No JVD.   Cardiovascular:      Rate and Rhythm: Normal rate and regular rhythm.      Pulses: Normal pulses.      Heart sounds: Normal heart sounds. No murmur heard.  Pulmonary:      Effort: Pulmonary effort is normal. No respiratory distress.      Breath sounds: Normal breath sounds. No stridor. No wheezing, rhonchi or rales.   Abdominal:      General: Bowel sounds are normal. There is no distension.      Palpations: Abdomen is soft.      Tenderness: There is abdominal tenderness.      Comments: RUQ tenderness with positive murpheys sign   Musculoskeletal:         General: No swelling or tenderness. Normal range of motion.      Cervical back: Normal range of motion and neck supple.   Skin:     General: Skin is warm and dry.      Capillary Refill: Capillary refill takes 2 to 3 seconds.      Coloration: Skin is not jaundiced or pale.      Findings: No erythema.   Neurological:      General: No focal deficit present.      Mental Status: She is alert and oriented to person, place, and time.      Cranial Nerves: No cranial nerve deficit.      Sensory: No sensory deficit.   Psychiatric:         Mood and Affect: Mood normal.         Behavior: Behavior normal.         Thought Content: Thought content normal.         CRANIAL NERVES     CN III, IV, VI   Pupils are equal, round, and reactive to light.     Significant Labs: All pertinent labs within the past 24 hours have  been reviewed.  CBC:   Recent Labs   Lab 06/23/22  0811   WBC 8.50   HGB 15.3   HCT 45.7        CMP:   Recent Labs   Lab 06/23/22  0811      K 4.0      CO2 23   *   BUN 17   CREATININE 1.0   CALCIUM 9.8   PROT 6.9   ALBUMIN 4.0   BILITOT 0.3   ALKPHOS 127   AST 19   ALT 18   ANIONGAP 10   EGFRNONAA 58*       Significant Imaging: I have reviewed all pertinent imaging results/findings within the past 24 hours.    Assessment/Plan:     * Acute cholecystitis  US reviewed  General surgery consulted from ED - plan for cholecystectomy today  Keep NPO  IV zosyn  IV prn pain and antiemetics      Asthma  Chronic, stable  Continue home inhalers  Monitor respiratory status postoperatively  Utilize p.r.n. neb treatments      GERD (gastroesophageal reflux disease)  Chronic, stable  Continue PPI  Keep head of bed elevated      Hypothyroidism  Chronic, stable  Continue home medications      Dyslipidemia  Chronic, stable  Continue medication        VTE Risk Mitigation (From admission, onward)         Ordered     IP VTE HIGH RISK PATIENT  Once         06/23/22 1033     Place sequential compression device  Until discontinued         06/23/22 1033     Reason for No Pharmacological VTE Prophylaxis  Once        Question:  Reasons:  Answer:  Risk of Bleeding    06/23/22 1033                   Shabana Castellano NP  Department of Hospital Medicine   [facility]

## 2022-06-23 NOTE — CONSULTS
GENERAL SURGERY  INPATIENT CONSULT    REASON FOR CONSULT: acute cholecystitis     HPI: Michelle Frazier is a 66 y.o. female who presented the emergency room with acute onset of right upper quadrant abdominal pain and associated nausea vomiting.  Pain woke her from sleep around 4:00 a.m..  Described as sharp and cramping, 10/10.  No specific alleviating or aggravating factors.  In the emergency room she was found have grossly normal labs.  Ultrasound revealed acute cholecystitis.  She was admitted to Medicine.  General surgery is consulted for evaluation.  Patient has a history of previous laparoscopic appendectomy but no other abdominal surgeries.  No yellowing of the skin or eyes.  She has had similar but less severe right upper quadrant pain approximally 4 months ago.    I have reviewed the patient's chart including prior progress notes, procedures and testing.     ROS:   Review of Systems   Constitutional: Positive for activity change. Negative for fever.   Respiratory: Negative for apnea, chest tightness and shortness of breath.    Cardiovascular: Negative for chest pain.   Gastrointestinal: Positive for abdominal distention, abdominal pain, nausea and vomiting.   Genitourinary: Negative for difficulty urinating and dysuria.   Neurological: Negative for dizziness and light-headedness.       PROBLEM LIST:  Patient Active Problem List   Diagnosis    Arthralgia of multiple joints    Diverticulosis of intestine    Dyslipidemia    Hay fever    Hypothyroidism    GERD (gastroesophageal reflux disease)    Acute intractable headache    Post-viral reactive airway disease    Statin intolerance    Acute cholecystitis    Asthma         HISTORY  Past Medical History:   Diagnosis Date    Arthralgia of multiple joints 10/1/2020    Diverticulosis of intestine 10/1/2020    Dyslipidemia 10/1/2020    GERD (gastroesophageal reflux disease)     Hypothyroidism        Past Surgical History:   Procedure Laterality Date     APPENDECTOMY      CARPAL TUNNEL RELEASE Right     COLONOSCOPY      EYE SURGERY      cataracts       Social History     Tobacco Use    Smoking status: Former Smoker     Types: Cigarettes     Quit date: 5/30/2012     Years since quitting: 10.0    Smokeless tobacco: Never Used   Substance Use Topics    Alcohol use: No    Drug use: No       Family History   Problem Relation Age of Onset    Arthritis Mother     Hearing loss Mother     Vision loss Mother     Arthritis Father     Colon cancer Paternal Grandmother         >80    Breast cancer Paternal Grandmother          MEDS:  No current facility-administered medications on file prior to encounter.     Current Outpatient Medications on File Prior to Encounter   Medication Sig Dispense Refill    albuterol (PROVENTIL) 2.5 mg /3 mL (0.083 %) nebulizer solution Take 3 mLs (2.5 mg total) by nebulization every 6 (six) hours as needed for Wheezing or Shortness of Breath. Rescue 120 mL 5    albuterol (PROVENTIL/VENTOLIN HFA) 90 mcg/actuation inhaler       ascorbic acid, vitamin C, (VITAMIN C) 1000 MG tablet Vitamin C      b complex vitamins tablet Take 1 tablet by mouth once daily.      fluticasone-umeclidin-vilanter (TRELEGY ELLIPTA) 200-62.5-25 mcg inhaler Inhale 1 puff into the lungs once daily. 3 each 3    pantoprazole (PROTONIX) 40 MG tablet Take 1 tablet (40 mg total) by mouth once daily. 90 tablet 1    SYNTHROID 50 mcg tablet TAKE 1 TABLET DAILY 90 tablet 1    vitamin D (VITAMIN D3) 1000 units Tab Take 1 tablet (1,000 Units total) by mouth once daily. 30 tablet 0    vitamin D3-vitamin K2 (D3 + K2 DOTS) 1000-90 unit-mcg TbDL D3 + K2 DOTS      zinc sulfate (ZINC-220 ORAL) Take 1 tablet by mouth once daily.      [DISCONTINUED] diphenhydrAMINE (SOMINEX) 25 mg tablet Take 25 mg by mouth nightly as needed for Insomnia.      diclofenac sodium (VOLTAREN) 1 % Gel Apply 2 g topically once daily.      ezetimibe (ZETIA) 10 mg tablet Take 1 tablet (10 mg  total) by mouth once daily. 90 tablet 1    [DISCONTINUED] cetirizine (ZYRTEC) 10 mg Cap Zyrtec      [DISCONTINUED] predniSONE (DELTASONE) 20 MG tablet Take 1 tablet (20 mg total) by mouth once daily. Take one pill a day for three days, repeat for shortness of breath 12 tablet 0    [DISCONTINUED] pulse oximeter (PULSE OXIMETER) device by Apply Externally route 2 (two) times a day. Use twice daily at 8 AM and 3 PM and record the value in [facility] as directed. 1 each 0       ALLERGIES:  Review of patient's allergies indicates:  No Known Allergies      VITALS:  Temp:  [97.4 °F (36.3 °C)-98.3 °F (36.8 °C)] 97.9 °F (36.6 °C)  Pulse:  [57-86] 59  Resp:  [15-20] 20  SpO2:  [91 %-100 %] 97 %  BP: (140-192)/(66-88) 148/68    No intake/output data recorded.      PHYSICAL EXAM:  Physical Exam  Vitals reviewed.   Constitutional:       General: She is not in acute distress.     Appearance: Normal appearance. She is well-developed. She is ill-appearing (in pain).   HENT:      Head: Normocephalic and atraumatic.      Nose: Nose normal.   Eyes:      General: No scleral icterus.     Conjunctiva/sclera: Conjunctivae normal.   Neck:      Trachea: No tracheal tenderness or tracheal deviation.   Cardiovascular:      Rate and Rhythm: Normal rate and regular rhythm.      Pulses: Normal pulses.   Pulmonary:      Effort: Pulmonary effort is normal. No accessory muscle usage or respiratory distress.      Breath sounds: Normal breath sounds.   Abdominal:      General: There is no distension.      Palpations: Abdomen is soft.      Tenderness: There is abdominal tenderness (ruq). There is no guarding or rebound.   Musculoskeletal:         General: No swelling or tenderness. Normal range of motion.      Cervical back: Normal range of motion and neck supple. No rigidity.   Skin:     General: Skin is warm and dry.      Coloration: Skin is not jaundiced.      Findings: No erythema.   Neurological:      General: No focal deficit present.       Mental Status: She is alert and oriented to person, place, and time.      Motor: No weakness or abnormal muscle tone.   Psychiatric:         Mood and Affect: Mood normal.         Behavior: Behavior normal.         Thought Content: Thought content normal.         Judgment: Judgment normal.           LABS:  Lab Results   Component Value Date    WBC 8.50 06/23/2022    RBC 4.90 06/23/2022    HGB 15.3 06/23/2022    HCT 45.7 06/23/2022     06/23/2022     Lab Results   Component Value Date     (H) 06/23/2022     06/23/2022    K 4.0 06/23/2022     06/23/2022    CO2 23 06/23/2022    BUN 17 06/23/2022    CREATININE 1.0 06/23/2022    CALCIUM 9.8 06/23/2022     Lab Results   Component Value Date    ALT 18 06/23/2022    AST 19 06/23/2022    ALKPHOS 127 06/23/2022    BILITOT 0.3 06/23/2022     Lab Results   Component Value Date    MG 2.3 11/16/2020    PHOS 3.1 11/16/2020       STUDIES:  Images and reports were personally reviewed.    Abd US  FINDINGS:  Liver: Normal in size, measuring 12 cm. There is increased hepatic echogenicity seen throughout the a patent parenchyma.  No focal hepatic lesions.     Gallbladder: There is cholelithiasis.  The sonographer is reporting a positive Haley's sign.  The gallbladder wall is thickened at 4 mm.     Biliary system: The common duct is not dilated, measuring 3 mm.  No intrahepatic ductal dilatation.     The right kidney measures 8.7 cm in length.     Miscellaneous: No upper abdominal ascites.     Impression:     There are findings concerning for cholecystitis, specifically the sonographer reports a positive Haley's sign, there is cholelithiasis and gallbladder wall thickening.  This report was flagged in Epic as abnormal.  Osiel      ASSESSMENT & PLAN:  66 y.o. female with acute cholecystitis  - history and imaging consistent with acute cholecystitis  - has been admitted and kept NPO on IV antibiotics  - discussed with the patient and her  indication for  cholecystectomy to prevent recurrence, risks were explained in detail, patient agreed proceed with surgical intervention  - to OR today for lap catrachita   - will be ok for clears post op to be advanced as tolerated  - abx can be stopped afterwards  - will call in post op pain meds

## 2022-06-24 VITALS
HEIGHT: 62 IN | TEMPERATURE: 98 F | RESPIRATION RATE: 16 BRPM | OXYGEN SATURATION: 96 % | SYSTOLIC BLOOD PRESSURE: 92 MMHG | BODY MASS INDEX: 32.86 KG/M2 | WEIGHT: 178.56 LBS | HEART RATE: 66 BPM | DIASTOLIC BLOOD PRESSURE: 54 MMHG

## 2022-06-24 LAB
ALBUMIN SERPL BCP-MCNC: 3.3 G/DL (ref 3.5–5.2)
ALP SERPL-CCNC: 106 U/L (ref 55–135)
ALT SERPL W/O P-5'-P-CCNC: 41 U/L (ref 10–44)
ANION GAP SERPL CALC-SCNC: 7 MMOL/L (ref 8–16)
AST SERPL-CCNC: 34 U/L (ref 10–40)
BASOPHILS # BLD AUTO: 0 K/UL (ref 0–0.2)
BASOPHILS NFR BLD: 0 % (ref 0–1.9)
BILIRUB SERPL-MCNC: 0.4 MG/DL (ref 0.1–1)
BUN SERPL-MCNC: 12 MG/DL (ref 8–23)
CALCIUM SERPL-MCNC: 9.1 MG/DL (ref 8.7–10.5)
CHLORIDE SERPL-SCNC: 108 MMOL/L (ref 95–110)
CO2 SERPL-SCNC: 25 MMOL/L (ref 23–29)
CREAT SERPL-MCNC: 1 MG/DL (ref 0.5–1.4)
DIFFERENTIAL METHOD: ABNORMAL
EOSINOPHIL # BLD AUTO: 0 K/UL (ref 0–0.5)
EOSINOPHIL NFR BLD: 0 % (ref 0–8)
ERYTHROCYTE [DISTWIDTH] IN BLOOD BY AUTOMATED COUNT: 13.3 % (ref 11.5–14.5)
EST. GFR  (AFRICAN AMERICAN): >60 ML/MIN/1.73 M^2
EST. GFR  (NON AFRICAN AMERICAN): 58 ML/MIN/1.73 M^2
GLUCOSE SERPL-MCNC: 129 MG/DL (ref 70–110)
HCT VFR BLD AUTO: 40.4 % (ref 37–48.5)
HGB BLD-MCNC: 13.4 G/DL (ref 12–16)
IMM GRANULOCYTES # BLD AUTO: 0.02 K/UL (ref 0–0.04)
IMM GRANULOCYTES NFR BLD AUTO: 0.3 % (ref 0–0.5)
LYMPHOCYTES # BLD AUTO: 0.7 K/UL (ref 1–4.8)
LYMPHOCYTES NFR BLD: 11 % (ref 18–48)
MCH RBC QN AUTO: 31.2 PG (ref 27–31)
MCHC RBC AUTO-ENTMCNC: 33.2 G/DL (ref 32–36)
MCV RBC AUTO: 94 FL (ref 82–98)
MONOCYTES # BLD AUTO: 0.1 K/UL (ref 0.3–1)
MONOCYTES NFR BLD: 1.5 % (ref 4–15)
NEUTROPHILS # BLD AUTO: 5.8 K/UL (ref 1.8–7.7)
NEUTROPHILS NFR BLD: 87.2 % (ref 38–73)
NRBC BLD-RTO: 0 /100 WBC
PLATELET # BLD AUTO: 208 K/UL (ref 150–450)
PMV BLD AUTO: 10.2 FL (ref 9.2–12.9)
POTASSIUM SERPL-SCNC: 4.7 MMOL/L (ref 3.5–5.1)
PROT SERPL-MCNC: 5.9 G/DL (ref 6–8.4)
RBC # BLD AUTO: 4.3 M/UL (ref 4–5.4)
SODIUM SERPL-SCNC: 140 MMOL/L (ref 136–145)
WBC # BLD AUTO: 6.62 K/UL (ref 3.9–12.7)

## 2022-06-24 PROCEDURE — 80053 COMPREHEN METABOLIC PANEL: CPT | Performed by: SURGERY

## 2022-06-24 PROCEDURE — 94640 AIRWAY INHALATION TREATMENT: CPT

## 2022-06-24 PROCEDURE — G0378 HOSPITAL OBSERVATION PER HR: HCPCS

## 2022-06-24 PROCEDURE — 25000003 PHARM REV CODE 250: Performed by: SURGERY

## 2022-06-24 PROCEDURE — 94799 UNLISTED PULMONARY SVC/PX: CPT

## 2022-06-24 PROCEDURE — 25000242 PHARM REV CODE 250 ALT 637 W/ HCPCS: Performed by: SURGERY

## 2022-06-24 PROCEDURE — 36415 COLL VENOUS BLD VENIPUNCTURE: CPT | Performed by: SURGERY

## 2022-06-24 PROCEDURE — 94761 N-INVAS EAR/PLS OXIMETRY MLT: CPT

## 2022-06-24 PROCEDURE — 85025 COMPLETE CBC W/AUTO DIFF WBC: CPT | Performed by: SURGERY

## 2022-06-24 RX ORDER — ONDANSETRON 4 MG/1
4 TABLET, ORALLY DISINTEGRATING ORAL EVERY 6 HOURS PRN
Qty: 30 TABLET | Refills: 0 | Status: SHIPPED | OUTPATIENT
Start: 2022-06-24 | End: 2023-12-20

## 2022-06-24 RX ADMIN — HYDROCODONE BITARTRATE AND ACETAMINOPHEN 1 TABLET: 5; 325 TABLET ORAL at 07:06

## 2022-06-24 RX ADMIN — PANTOPRAZOLE SODIUM 40 MG: 40 TABLET, DELAYED RELEASE ORAL at 08:06

## 2022-06-24 RX ADMIN — LEVOTHYROXINE SODIUM 50 MCG: 50 TABLET ORAL at 05:06

## 2022-06-24 RX ADMIN — FLUTICASONE FUROATE, UMECLIDINIUM BROMIDE AND VILANTEROL TRIFENATATE 1 PUFF: 200; 62.5; 25 POWDER RESPIRATORY (INHALATION) at 08:06

## 2022-06-24 NOTE — DISCHARGE INSTRUCTIONS
Rest and drink adequate fluids.  Take medications as prescribed.  Follow-up as directed.  Follow Dr. Park's postoperative instructions for activity, lifting restrictions and driving restrictions.  Return to ER for any worsening symptoms or concerns.    Discharge Instructions, [facility] Medicine    Thank you for choosing [facility] for your medical care.     You were admitted to the hospital with Acute cholecystitis.     Please note your discharge instructions, including diet/activity restrictions, follow-up appointments, and medication changes.  If you have any questions about your medical issues, prescriptions, or any other questions, please feel free to contact the [facility] Medicine Dept at [phone] and we will help.    If you are previously with Home health, outpatient PT/OT or under a therapy program, you are cleared to return to those programs.    Please direct all long term medication refills and follow up to your primary care provider, Matt Beltre Jr, MD. Thank you again for letting us take care of your health care needs.    Please note the following discharge instructions per your discharging physician-  Shabana Castellano NP

## 2022-06-24 NOTE — PLAN OF CARE
[facility]  Initial Discharge Assessment       Primary Care Provider: Matt Beltre Jr, MD    Admission Diagnosis: Acute cholecystitis [K81.0]    Admission Date: 6/23/2022  Expected Discharge Date: 6/24/2022         Payor: MEDICARE / Plan: MEDICARE PART A & B / Product Type: Government /     Extended Emergency Contact Information  Primary Emergency Contact: Amari Frazier   [facility]  Home Phone: [phone]  Relation: Spouse    Discharge Plan A: Home with family  Discharge Plan B: Home      [id number] - [place], [place] - [address]  [address]  [place] [address]  Phone: [phone] Fax: [phone]      Initial Assessment (most recent)     Adult Discharge Assessment - 06/24/22 1404        Discharge Assessment    Assessment Type Discharge Planning Assessment     Confirmed/corrected address, phone number and insurance Yes     Confirmed Demographics Correct on Facesheet     Source of Information patient     Does patient/caregiver understand observation status Yes     Communicated RHIANNON with patient/caregiver Yes     Lives With spouse     Facility Arrived From: home     Do you expect to return to your current living situation? Yes     Do you have help at home or someone to help you manage your care at home? Yes     Prior to hospitilization cognitive status: Alert/Oriented     Current cognitive status: Alert/Oriented     Walking or Climbing Stairs Difficulty none     Dressing/Bathing Difficulty none     Equipment Currently Used at Home nebulizer     Readmission within 30 days? No     Patient currently being followed by outpatient case management? No     Do you currently have service(s) that help you manage your care at home? No     Do you take prescription medications? Yes     Do you have prescription coverage? Yes     Coverage      Do you have any problems affording any of your prescribed medications? No     Is the patient taking medications as prescribed? yes      Who is going to help you get home at discharge? spouse     How do you get to doctors appointments? car, drives self;family or friend will provide     Are you on dialysis? No     Do you take coumadin? No     Discharge Plan A Home with family     Discharge Plan B Home     DME Needed Upon Discharge  none     Discharge Plan discussed with: Patient

## 2022-06-24 NOTE — PROGRESS NOTES
General Surgery Progress Note    Admit Date: 6/23/2022  S/P: Procedure(s) (LRB):  CHOLECYSTECTOMY, LAPAROSCOPIC (N/A)    Post-operative Day: 1 Day Post-Op    Hospital Day: 2    SUBJECTIVE:   Status post laparoscopic cholecystectomy for acute cholecystitis.  Feeling much better this morning.  Tolerated diet but with minimal appetite.  Does have some abdominal bloating but no nausea or vomiting.    OBJECTIVE:     Vital Signs (Most Recent)  Temp:  [97.4 °F (36.3 °C)-98.5 °F (36.9 °C)] 98.3 °F (36.8 °C)  Pulse:  [57-86] 61  Resp:  [14-27] 18  SpO2:  [88 %-100 %] 92 %  BP: ()/(55-88) 104/57    I&Os:  I/O last 3 completed shifts:  In: 1320 [P.O.:120; I.V.:1000; IV Piggyback:200]  Out: -     Physical Exam:  Gen: NAD, AAOx3  HEENT: Anicteric sclera  Pulm: unlabored, symmetrical   Abd:  Soft with appropriate tenderness palpation, incisions intact    Laboratory:  CBC:   Recent Labs   Lab 06/24/22  0321   WBC 6.62   RBC 4.30   HGB 13.4   HCT 40.4      MCV 94   MCH 31.2*   MCHC 33.2     CMP:   Recent Labs   Lab 06/24/22  0321   *   CALCIUM 9.1   ALBUMIN 3.3*   PROT 5.9*      K 4.7   CO2 25      BUN 12   CREATININE 1.0   ALKPHOS 106   ALT 41   AST 34   BILITOT 0.4     Labs within the past 24 hours have been reviewed.    ASSESSMENT/PLAN:     Patient Active Problem List    Diagnosis Date Noted    Acute cholecystitis 06/23/2022    Asthma 06/23/2022    Statin intolerance 03/17/2022    Post-viral reactive airway disease 12/28/2020    Acute intractable headache 11/11/2020    Arthralgia of multiple joints 10/01/2020    Diverticulosis of intestine 10/01/2020    Dyslipidemia 10/01/2020    Hay fever 10/01/2020    Hypothyroidism     GERD (gastroesophageal reflux disease)          66 y.o. female status post laparoscopic cholecystectomy  - doing well  - clear for discharge from surgery standpoint  - postoperative pain medications have been called in and instructions have been left in discharge  tab  - will follow-up with surgery in approximately 2 weeks    Please call with any questions or concerns.    Cristian Park MD  General Surgery  [phone]

## 2022-06-24 NOTE — PLAN OF CARE
Problem: Adult Inpatient Plan of Care  Goal: Plan of Care Review  Outcome: Ongoing, Progressing  Goal: Patient-Specific Goal (Individualized)  Outcome: Ongoing, Progressing  Goal: Absence of Hospital-Acquired Illness or Injury  Outcome: Ongoing, Progressing  Goal: Optimal Comfort and Wellbeing  Outcome: Ongoing, Progressing  Goal: Readiness for Transition of Care  Outcome: Ongoing, Progressing     Problem: Fall Injury Risk  Goal: Absence of Fall and Fall-Related Injury  Outcome: Ongoing, Progressing     Pt A/O. No c/o pain or discomfort. Rounded on pt atleast every 2 hours addressing pain, safety, call light and water within reach. Lap sites well approximated with no drainage. No acute events observed or reported during shift.

## 2022-06-24 NOTE — CARE UPDATE
06/24/22 0859   Patient Assessment/Suction   Level of Consciousness (AVPU) alert   Respiratory Effort Unlabored   All Lung Fields Breath Sounds clear   PRE-TX-O2   O2 Device (Oxygen Therapy) room air   SpO2 96 %   Pulse Oximetry Type Intermittent   $ Pulse Oximetry - Multiple Charge Pulse Oximetry - Multiple   Pulse 66   Resp 16   Inhaler   $ Inhaler Charges MDI (Metered Dose Inahler) Treatment   Daily Review of Necessity (Inhaler) completed   Respiratory Treatment Status (Inhaler) given   Treatment Route (Inhaler) mouthpiece   Patient Position (Inhaler) HOB elevated   Post Treatment Assessment (Inhaler) breath sounds unchanged   Signs of Intolerance (Inhaler) none   Breath Sounds Post-Respiratory Treatment   Post-treatment Heart Rate (beats/min) 66   Post-treatment Resp Rate (breaths/min) 16   Incentive Spirometer   $ Incentive Spirometer Charges done with encouragement   Administration (IS) proper technique demonstrated   Number of Repetitions (IS) 10   Level Incentive Spirometer (mL) 2250   Patient Tolerance (IS) good

## 2022-06-24 NOTE — PLAN OF CARE
Pt clear for DC from case management standpoint. Discharging to home       06/24/22 1036   Final Note   Assessment Type Final Discharge Note   Anticipated Discharge Disposition Home   Hospital Resources/Appts/Education Provided Appointments scheduled by Navigator/Coordinator

## 2022-06-24 NOTE — PLAN OF CARE
06/24/22 0940   CHAIDEZ Message   Medicare Outpatient and Observation Notification regarding financial responsibility Given to patient/caregiver;Signed/date by patient/caregiver;Explained to patient/caregiver   Date CHAIDEZ was signed 06/24/22   Time CHAIDEZ was signed 0940

## 2022-06-24 NOTE — PLAN OF CARE
CM requested follow up apt with PCP from Nany Condon ([facility] Director). Nany will contact pt to schedule.       Post op scheduled with Dr. Park- updated AVS

## 2022-06-24 NOTE — NURSING
Discharge instructions given to patient she and  verbalized understanding. Pain medication e scribed to pharmacy. Removed piv she tolerated well tip intact. Pt left via wheelchair with all her belongings. Relinquished care.

## 2022-06-25 NOTE — HOSPITAL COURSE
Pt admitted for acute cholecystitis. Pt was placed NPO, initiated on IVFs and IV zosyn. Pt was eval by general surgery and underwent cholecystectomy with Dr. Park. Pt was monitored closely post op and had no post op complications. She tolerated diet well. Her pain was well controlled. Pt was cleared for discharge. Pt verbalized understanding of discharge instructions and return precautions.     PE:  AAO x 3, pleasant, NAD  Heart - RRR, no edema  Lungs - CTA bilat, resp even unlabored  Abd - soft, mild expected post op tenderness over laprascopic sites. Incisions covered with drsg CDI, no erythema or drainage.

## 2022-06-25 NOTE — DISCHARGE SUMMARY
[facility] Hospital Medicine  Discharge Summary      Patient Name: Michelle Frazier  MRN: [id number]  Patient Class: OP- Observation  Admission Date: 6/23/2022  Hospital Length of Stay: 0 days  Discharge Date and Time: 6/24/2022 10:35 AM  Attending Physician: No att. providers found   Discharging Provider: Shabana Castellano NP  Primary Care Provider: Matt Beltre Jr, MD      HPI:   Michelle Frazier is a 66-year-old female with past medical history significant for asthma, hyperlipidemia, hypothyroidism, and GERD presenting today for right upper quadrant abdominal pain.  Patient reports she developed sudden onset right upper quadrant abdominal pain at 4:30 a.m. this morning with associated nausea and vomiting.  She describes the pain as sharp quality, 10/10 intensity, no aggravating factors, relieved after receiving IV pain medication in the ED.  ED workup reveals acute cholecystitis.  General surgery consulted and plans for surgical intervention today.  Patient denies chest pain, shortness of breath, fever, chills,or diarrhea.  Patient reports she experienced similar right upper quadrant abdominal discomfort approximately 4 months ago and she was evaluated by her PCP was told it was possibly her gallbladder however she did not undergo any imaging at that time.  She was told by her PCP for occurred again to present to the ED.  Patient reports she ate barbecue chicken and baked beans for dinner last night.  Patient will be admitted to hospitalist services for further observation and management.        Procedure(s) (LRB):  CHOLECYSTECTOMY, LAPAROSCOPIC (N/A)      Hospital Course:   Pt admitted for acute cholecystitis. Pt was placed NPO, initiated on IVFs and IV zosyn. Pt was eval by general surgery and underwent cholecystectomy with Dr. Park. Pt was monitored closely post op and had no post op complications. She tolerated diet well. Her pain was well controlled. Pt was cleared for discharge. Pt  verbalized understanding of discharge instructions and return precautions.     PE:  AAO x 3, pleasant, NAD  Heart - RRR, no edema  Lungs - CTA bilat, resp even unlabored  Abd - soft, mild expected post op tenderness over laprascopic sites. Incisions covered with drsg CDI, no erythema or drainage.        Goals of Care Treatment Preferences:  Code Status: Full Code      Consults:   Consults (From admission, onward)        Status Ordering Provider     Inpatient consult to General Surgery  Once        Provider:  Cristian Park Jr., MD    Completed JAG ROSS     Inpatient consult to General surgery  Once        Provider:  Cristian Park Jr., MD    Completed DARWIN ACOSTA          No new Assessment & Plan notes have been filed under this hospital service since the last note was generated.  Service: Hospital Medicine    Final Active Diagnoses:    Diagnosis Date Noted POA    PRINCIPAL PROBLEM:  Acute cholecystitis [K81.0] 06/23/2022 Yes    Asthma [J45.909] 06/23/2022 Yes    Dyslipidemia [E78.5] 10/01/2020 Yes    Hypothyroidism [E03.9]  Yes    GERD (gastroesophageal reflux disease) [K21.9]  Yes      Problems Resolved During this Admission:       Discharged Condition: good    Disposition: Home or Self Care    Follow Up:   Follow-up Information     Cristian Park Jr, MD Follow up on 7/8/2022.    Specialty: General Surgery  Why: post op  Contact information:  [address] [facility]  [address]  [facility] [address]  [phone]             Matt Beltre Jr, MD Follow up in 3 day(s).    Specialty: Internal Medicine  Why: hospital follow up, post op follow up    office will call you to schedule. if you do not hear from them by Monday. please call. would like for you to be seen within 7 days  Contact information:  [address]  [place] [address]  [phone]                       Patient Instructions:      Diet Adult Regular     Ice to affected area     Lifting restrictions   Order Comments:  Please avoid lifting greater than 20 lb, straining, strenuous activity for four weeks.     Change dressing (specify)   Order Comments: Post-Operative Wound Care    A surgical glue has been placed over your incisions, please leave the glue in place and do not attempt to remove it.  It is ok to shower using mild soap and water over the incisions the day after your procedure. Pat dry your incisions. Do not soak in a bathtub or other body of water for 2 weeks or until cleared by your surgeon.     If you noticed redness, swelling, fever, increasing pain or significant drainage from your wound please call/message the office or the 24 hr nurse hotline after hours.     Notify your health care provider if you experience any of the following:  temperature >100.4     Notify your health care provider if you experience any of the following:  persistent nausea and vomiting or diarrhea     Notify your health care provider if you experience any of the following:  severe uncontrolled pain     Notify your health care provider if you experience any of the following:  redness, tenderness, or signs of infection (pain, swelling, redness, odor or green/yellow discharge around incision site)     Notify your health care provider if you experience any of the following:  worsening rash     Notify your health care provider if you experience any of the following:  increased confusion or weakness     Shower on day dressing removed (No bath)       Significant Diagnostic Studies: Labs:   CMP   Recent Labs   Lab 06/24/22  0321      K 4.7      CO2 25   *   BUN 12   CREATININE 1.0   CALCIUM 9.1   PROT 5.9*   ALBUMIN 3.3*   BILITOT 0.4   ALKPHOS 106   AST 34   ALT 41   ANIONGAP 7*   ESTGFRAFRICA >60   EGFRNONAA 58*   , CBC   Recent Labs   Lab 06/24/22  0321   WBC 6.62   HGB 13.4   HCT 40.4       and All labs within the past 24 hours have been reviewed    Pending Diagnostic Studies:     Procedure Component Value Units  Date/Time    Specimen to Pathology, Surgery General Surgery [[id number]] Collected: 06/23/22 1628    Order Status: Sent Lab Status: In process Updated: 06/24/22 0731    Specimen: Tissue          Medications:  Reconciled Home Medications:      Medication List      START taking these medications    HYDROcodone-acetaminophen 5-325 mg per tablet  Commonly known as: NORCO  Take 1 tablet by mouth every 6 (six) hours as needed for Pain.     ibuprofen 200 MG tablet  Commonly known as: ADVIL,MOTRIN  Take 3 tablets (600 mg total) by mouth every 8 (eight) hours. for 3 days     ondansetron 4 MG Tbdl  Commonly known as: ZOFRAN-ODT  Take 1 tablet (4 mg total) by mouth every 6 (six) hours as needed (nausea or vomiting).        CONTINUE taking these medications    * albuterol 90 mcg/actuation inhaler  Commonly known as: PROVENTIL/VENTOLIN HFA     * albuterol 2.5 mg /3 mL (0.083 %) nebulizer solution  Commonly known as: PROVENTIL  Take 3 mLs (2.5 mg total) by nebulization every 6 (six) hours as needed for Wheezing or Shortness of Breath. Rescue     ascorbic acid (vitamin C) 1000 MG tablet  Commonly known as: VITAMIN C  Vitamin C     b complex vitamins tablet  Take 1 tablet by mouth once daily.     D3 PLUS K2 DOTS 1000-90 unit-mcg Tbdl  Generic drug: vitamin D3-vitamin K2  D3 + K2 DOTS     diclofenac sodium 1 % Gel  Commonly known as: VOLTAREN  Apply 2 g topically once daily.     ezetimibe 10 mg tablet  Commonly known as: ZETIA  Take 1 tablet (10 mg total) by mouth once daily.     pantoprazole 40 MG tablet  Commonly known as: PROTONIX  Take 1 tablet (40 mg total) by mouth once daily.     SYNTHROID 50 MCG tablet  Generic drug: levothyroxine  TAKE 1 TABLET DAILY     TRELEGY ELLIPTA 200-62.5-25 mcg inhaler  Generic drug: fluticasone-umeclidin-vilanter  Inhale 1 puff into the lungs once daily.     vitamin D 1000 units Tab  Commonly known as: VITAMIN D3  Take 1 tablet (1,000 Units total) by mouth once daily.     ZINC-220 ORAL  Take 1  tablet by mouth once daily.         * This list has 2 medication(s) that are the same as other medications prescribed for you. Read the directions carefully, and ask your doctor or other care provider to review them with you.                Indwelling Lines/Drains at time of discharge:   Lines/Drains/Airways     None                 Time spent on the discharge of patient: 40 minutes         Shabana Castellano NP  Department of Hospital Medicine  [facility]

## 2022-06-27 ENCOUNTER — TELEPHONE (OUTPATIENT)
Dept: MEDSURG UNIT | Facility: HOSPITAL | Age: 67
End: 2022-06-27
Payer: MEDICARE

## 2022-06-28 LAB
FINAL PATHOLOGIC DIAGNOSIS: NORMAL
GROSS: NORMAL
Lab: NORMAL
MICROSCOPIC EXAM: NORMAL

## 2022-07-06 ENCOUNTER — OFFICE VISIT (OUTPATIENT)
Dept: PULMONOLOGY | Facility: CLINIC | Age: 67
End: 2022-07-06
Payer: MEDICARE

## 2022-07-06 VITALS
HEIGHT: 62 IN | WEIGHT: 193.69 LBS | BODY MASS INDEX: 35.64 KG/M2 | HEART RATE: 79 BPM | DIASTOLIC BLOOD PRESSURE: 88 MMHG | SYSTOLIC BLOOD PRESSURE: 142 MMHG | OXYGEN SATURATION: 96 %

## 2022-07-06 DIAGNOSIS — J45.51 SEVERE PERSISTENT ASTHMA WITH ACUTE EXACERBATION: ICD-10-CM

## 2022-07-06 DIAGNOSIS — J45.50 SEVERE PERSISTENT ASTHMA WITHOUT COMPLICATION: ICD-10-CM

## 2022-07-06 PROCEDURE — 99213 OFFICE O/P EST LOW 20 MIN: CPT | Mod: S$PBB,,, | Performed by: NURSE PRACTITIONER

## 2022-07-06 PROCEDURE — 99999 PR PBB SHADOW E&M-EST. PATIENT-LVL III: CPT | Mod: PBBFAC,,, | Performed by: NURSE PRACTITIONER

## 2022-07-06 PROCEDURE — 99999 PR PBB SHADOW E&M-EST. PATIENT-LVL III: ICD-10-PCS | Mod: PBBFAC,,, | Performed by: NURSE PRACTITIONER

## 2022-07-06 PROCEDURE — 99213 OFFICE O/P EST LOW 20 MIN: CPT | Mod: PBBFAC,PO | Performed by: NURSE PRACTITIONER

## 2022-07-06 PROCEDURE — 99213 PR OFFICE/OUTPT VISIT, EST, LEVL III, 20-29 MIN: ICD-10-PCS | Mod: S$PBB,,, | Performed by: NURSE PRACTITIONER

## 2022-07-06 RX ORDER — FLUTICASONE FUROATE, UMECLIDINIUM BROMIDE AND VILANTEROL TRIFENATATE 200; 62.5; 25 UG/1; UG/1; UG/1
1 POWDER RESPIRATORY (INHALATION) DAILY
Qty: 180 EACH | Refills: 3 | Status: SHIPPED | OUTPATIENT
Start: 2022-07-06 | End: 2023-08-07 | Stop reason: SDUPTHER

## 2022-07-06 RX ORDER — FLUTICASONE FUROATE, UMECLIDINIUM BROMIDE AND VILANTEROL TRIFENATATE 200; 62.5; 25 UG/1; UG/1; UG/1
1 POWDER RESPIRATORY (INHALATION) DAILY
Qty: 180 EACH | Refills: 3 | Status: SHIPPED | OUTPATIENT
Start: 2022-07-06 | End: 2022-07-06 | Stop reason: SDUPTHER

## 2022-07-06 RX ORDER — ALBUTEROL SULFATE 90 UG/1
2 AEROSOL, METERED RESPIRATORY (INHALATION) EVERY 4 HOURS PRN
Qty: 18 G | Refills: 11 | Status: SHIPPED | OUTPATIENT
Start: 2022-07-06 | End: 2023-08-07 | Stop reason: SDUPTHER

## 2022-07-06 NOTE — PROGRESS NOTES
7/6/2022    Michelle Frazier      Chief Complaint   Patient presents with    3m f/u    Asthma       HPI:   7/6/2022- currently recovering from Gallbladder removal. States breathing is improved following surgery due to being away from office. On Trelegy daily with benefit.   No current complaint of cough, wheeze,  SOB- stable, only with exertion, not exerting herself due to recent surgery    3/24/2022- States asthma has improved while on Trelegy 200 daily, noticed her triggers are sudden changes in weather and strong smells.   Complaint of chest tightness- recurrent complaint, 3 times weekly, improves with albuterol rescue, worse when feeling stressed. Associated with wheeze.   Cough- non productive, worse when laying down, nocturnal arousals most nights of week.   Had pneumonia vaccine.     Had to take prednisone for asthma attack last January, felt much better after 3 days of prednisone.   Has asthma attack while showering due to exposure to , considered going to ER so severe, took multiple inhalation of albuterol inhaler to improve.        8/4/2021- States SOB is improved, improves with albuterol rescue inhaler 3x weekly, worse when noxious smells are inhaled such as fresh paint. Used inhaler this morning with chest tightness. On Trelegy with great benefit. Cough- improved, worse when sinus drain. No current complaint of wheeze.     5/4/2021- SOB- currently a daily complaint, worse with exertion, lives with stairs and has to rest after walking up stairs.   Cough- worse at night, worsened in last 2 months, stopped taking Trelegy daily until January 2021. Improves with albuterol rescue having to use 2x daily. States worse when she smells strong odors or gets worse.   Associated with chest tightness and wheeze.     12/28/2020- rash is improved, no longer painful with scabbed blisters.   SOB- recurrent problem, improved during the day, Worse with exertion, has severe SOB when laying down at night. causes severe  anxiety gasping for air 2x times in last 4 weeks.  Improved with albuterol rescue inhaler. Using oxygen at night,     11/24/2020- new onset rash, onset 5 days. Started with back ache that improved with over the counter aleve and a few red spots on lower back that spread across entire back and around to abdomen on left side. Describes as burning, tingling pins sticking in skin 7 on 0-10 pain scale.     SOB- worse when wearing mask and exertion walking up stairs, wearing supplemental oxygen at 2 L during day as needed.  Associated with chest tightness worse when bending over and fatigue.   No cough,     Dr. Fagan consult: 11/11 consult by Dr Fagan:   History of Present Illness:  Pt healthy, lives in [place] with .  Has good function, developed headaches and n/v/d last 10 days , takes in very little.  Voiding minimally.  Pt developed sob ppt er visit.  covid + on 11/4 .  Breathing well now on nc ox.  Headache severe, not eating. Plan:  November 11th temperature max 101.6, ferritin 458, creatinine 0.7, chest x-ray ground-glass opacity in the lateral left lung-right lung looks clear.   Will give couple liters d5 1/2.   norco for headahce prn   Discussed remdesivir and plasma- kodi late in course but may have some benefit- pt wishes to receive.  sterroids ordered. Discussed bipap/vent if needed.         The chief compliant  problem varies with instablilty at time    PFSH:  Past Medical History:   Diagnosis Date    Arthralgia of multiple joints 10/1/2020    Diverticulosis of intestine 10/1/2020    Dyslipidemia 10/1/2020    GERD (gastroesophageal reflux disease)     Hypothyroidism          Past Surgical History:   Procedure Laterality Date    APPENDECTOMY      CARPAL TUNNEL RELEASE Right     COLONOSCOPY      EYE SURGERY      cataracts    LAPAROSCOPIC CHOLECYSTECTOMY N/A 6/23/2022    Procedure: CHOLECYSTECTOMY, LAPAROSCOPIC;  Surgeon: Cristian Park Jr., MD;  Location: [facility];  Service: General;   "Laterality: N/A;     Social History     Tobacco Use    Smoking status: Former Smoker     Types: Cigarettes     Quit date: 5/30/2012     Years since quitting: 10.1    Smokeless tobacco: Never Used   Substance Use Topics    Alcohol use: No    Drug use: No     Family History   Problem Relation Age of Onset    Arthritis Mother     Hearing loss Mother     Vision loss Mother     Arthritis Father     Colon cancer Paternal Grandmother         >80    Breast cancer Paternal Grandmother      Review of patient's allergies indicates:  No Known Allergies  I have reviewed past medical, family, and social history. I have reviewed previous nurse notes.    Performance Status:The patient's activity level is housebound activities.      Review of Systems:  a review of eleven systems covering constitutional, Eye, HEENT, Psych, Respiratory, Cardiac, GI, , Musculoskeletal, Endocrine, Dermatologic was negative except for pertinent findings as listed ABOVE and below: pertinent positive as above, rest is good  Shortness of breath, chest tightness, wheeze.          Exam:Comprehensive exam done. BP (!) 142/88 (BP Location: Right arm, Patient Position: Sitting, BP Method: Medium (Automatic))   Pulse 79   Ht 5' 2" (1.575 m)   Wt 87.9 kg (193 lb 10.8 oz)   SpO2 96% Comment: on room air at rest  BMI 35.42 kg/m²   Exam included Vitals as listed, and patient's appearance and affect and alertness and mood, oral exam for yeast and hygiene and pharynx lesions and Mallapatti (M) score, neck with inspection for jvd and masses and thyroid abnormalities and lymph nodes (supraclavicular and infraclavicular nodes and axillary also examined and noted if abn), chest exam included symmetry and effort and fremitus and percussion and auscultation, cardiac exam included rhythm and gallops and murmur and rubs and jvd and edema, abdominal exam for mass and hepatosplenomegaly and tenderness and hernias and bowel sounds, Musculoskeletal exam with " muscle tone and posture and mobility/gait and  strength, and skin for rashes and cyanosis and pallor and turgor, extremity for clubbing.  Findings were normal except for pertinent findings listed below: M2, BS clear        Radiographs (ct chest and cxr) reviewed: view by direct vision   X-Ray Chest 1 View  11/13/2020   There is bibasilar atelectasis versus infiltrate new from prior exam.     X-Ray Chest AP Portable 11/11/2020   A peripheral interstitial infiltrate is identified in the left lung but not in the right lung.  This is an unusual pattern but viral pneumonitis is still suspected       Labs reviewed    Lab Results   Component Value Date    WBC 6.62 06/24/2022    RBC 4.30 06/24/2022    HGB 13.4 06/24/2022    HCT 40.4 06/24/2022    MCV 94 06/24/2022    MCH 31.2 (H) 06/24/2022    MCHC 33.2 06/24/2022    RDW 13.3 06/24/2022     06/24/2022    MPV 10.2 06/24/2022    GRAN 5.8 06/24/2022    GRAN 87.2 (H) 06/24/2022    LYMPH 0.7 (L) 06/24/2022    LYMPH 11.0 (L) 06/24/2022    MONO 0.1 (L) 06/24/2022    MONO 1.5 (L) 06/24/2022    EOS 0.0 06/24/2022    BASO 0.00 06/24/2022    EOSINOPHIL 0.0 06/24/2022    BASOPHIL 0.0 06/24/2022 11/4/20 POC Rapid COVID Negative PositiveAbnormal       PFT reviewed  Pulmonary Functions Testing Results:  Spirometry with bronchodilator, lung volume by gas dilution, and diffusion capacity measured December 29, 2020. The FEV1 to FVC ratio was 80% indicating no airflow obstruction was measured by spirometry. The FEV1 was 117% of predicted at 2.5 L. There was    no improvement following bronchodilator. Total lung capacity on lung volume by gas dilution was 95% of predicted and normal. Diffusion also was normal at 81% predicted.Study is normal. Clinical correlation recommended. There was no bronchodilator   response measured.       Plan:  Clinical impression is apparently straight forward and impression with management as below.    Michelle was seen today for 3m f/u  Asthma.    Diagnoses and all orders for this visit:    Severe persistent asthma without complication  -     albuterol (PROVENTIL/VENTOLIN HFA) 90 mcg/actuation inhaler; Inhale 2 puffs into the lungs every 4 (four) hours as needed for Wheezing or Shortness of Breath.  -     fluticasone-umeclidin-vilanter (TRELEGY ELLIPTA) 200-62.5-25 mcg inhaler; Inhale 1 puff into the lungs once daily.        Follow up in about 6 months (around 1/6/2023), or if symptoms worsen or fail to improve.    Discussed with patient above for education the following:      Patient Instructions   Continue current Asthma medication regiment

## 2022-07-08 ENCOUNTER — OFFICE VISIT (OUTPATIENT)
Dept: SURGERY | Facility: CLINIC | Age: 67
End: 2022-07-08
Payer: MEDICARE

## 2022-07-08 VITALS
BODY MASS INDEX: 35.75 KG/M2 | DIASTOLIC BLOOD PRESSURE: 79 MMHG | HEIGHT: 62 IN | SYSTOLIC BLOOD PRESSURE: 137 MMHG | TEMPERATURE: 98 F | HEART RATE: 71 BPM | WEIGHT: 194.25 LBS

## 2022-07-08 DIAGNOSIS — Z90.49 S/P LAPAROSCOPIC CHOLECYSTECTOMY: Primary | ICD-10-CM

## 2022-07-08 PROCEDURE — 99999 PR PBB SHADOW E&M-EST. PATIENT-LVL III: ICD-10-PCS | Mod: PBBFAC,,, | Performed by: SURGERY

## 2022-07-08 PROCEDURE — 99024 POSTOP FOLLOW-UP VISIT: CPT | Mod: POP,,, | Performed by: SURGERY

## 2022-07-08 PROCEDURE — 99999 PR PBB SHADOW E&M-EST. PATIENT-LVL III: CPT | Mod: PBBFAC,,, | Performed by: SURGERY

## 2022-07-08 PROCEDURE — 99024 PR POST-OP FOLLOW-UP VISIT: ICD-10-PCS | Mod: POP,,, | Performed by: SURGERY

## 2022-07-08 PROCEDURE — 99213 OFFICE O/P EST LOW 20 MIN: CPT | Mod: PBBFAC,PN | Performed by: SURGERY

## 2022-07-08 NOTE — LETTER
July 8, 2022    Michelle Frazier  [address]  [place] [address]         [place] [facility] [address] [facility]  [address], [address]  [place] [address]  Phone: [phone] July 8, 2022     Patient: Michelle Frazier   YOB: 1955   Date of Visit: 7/8/2022       To Whom It May Concern:    It is my medical opinion that Michelle Frazier may return to work on 7/13/22 on light duty (no lifting >20 lbs, straining or strenous activity). After 7/21/22 she will have no restrictions.    If you have any questions or concerns, please don't hesitate to call.    Sincerely,        Cristian Park Jr., MD

## 2022-07-08 NOTE — PROGRESS NOTES
GENERAL SURGERY  POST-OP PROGRESS NOTE    HPI: Michelle Frazier is a 66 y.o. female status post laparoscopic cholecystectomy for acute cholecystitis.  Here today for follow-up.  Overall patient reports her pain is significantly improved.  She still has some residual soreness especially at the epigastric incision site.  Initially had loose stools but becoming more solid.  Has some aversion to food and decreased appetite with occasional nausea but no vomiting.  No fevers or chills.  No yellowing of the skin or eyes.  Does feel her symptoms are improving slowly in regards to her diet and bowel function.    VITALS:  Vitals:    07/08/22 1045   BP: 137/79   Pulse: 71   Temp: 98.3 °F (36.8 °C)       PHYSICAL EXAM:  Abdominal incisions well-healed without signs of infection or breakdown    PATHOLOGY:  Gallbladder, cholecystectomy:   - Acute cholecystitis with increased eosinophils   - Cholelithiasis     ASSESSMENT & PLAN:  66 y.o. female s/p laparoscopic cholecystectomy  - overall the patient is improved  - I expect her bowel function and appetite to improve over the next 1-2 weeks  - patient will contact me if symptoms are not improving, otherwise return to clinic p.r.n.

## 2023-01-06 ENCOUNTER — OFFICE VISIT (OUTPATIENT)
Dept: PULMONOLOGY | Facility: CLINIC | Age: 68
End: 2023-01-06
Payer: MEDICARE

## 2023-01-06 VITALS
HEIGHT: 62 IN | BODY MASS INDEX: 36.44 KG/M2 | OXYGEN SATURATION: 98 % | SYSTOLIC BLOOD PRESSURE: 139 MMHG | WEIGHT: 198 LBS | DIASTOLIC BLOOD PRESSURE: 85 MMHG | HEART RATE: 70 BPM

## 2023-01-06 DIAGNOSIS — R05.1 ACUTE COUGH: ICD-10-CM

## 2023-01-06 DIAGNOSIS — J45.51 SEVERE PERSISTENT ASTHMA WITH ACUTE EXACERBATION: Primary | ICD-10-CM

## 2023-01-06 PROCEDURE — 99214 OFFICE O/P EST MOD 30 MIN: CPT | Mod: PBBFAC,PO | Performed by: NURSE PRACTITIONER

## 2023-01-06 PROCEDURE — 99214 PR OFFICE/OUTPT VISIT, EST, LEVL IV, 30-39 MIN: ICD-10-PCS | Mod: S$PBB,,, | Performed by: NURSE PRACTITIONER

## 2023-01-06 PROCEDURE — 99999 PR PBB SHADOW E&M-EST. PATIENT-LVL IV: ICD-10-PCS | Mod: PBBFAC,,, | Performed by: NURSE PRACTITIONER

## 2023-01-06 PROCEDURE — 99999 PR PBB SHADOW E&M-EST. PATIENT-LVL IV: CPT | Mod: PBBFAC,,, | Performed by: NURSE PRACTITIONER

## 2023-01-06 PROCEDURE — 99214 OFFICE O/P EST MOD 30 MIN: CPT | Mod: S$PBB,,, | Performed by: NURSE PRACTITIONER

## 2023-01-06 RX ORDER — CODEINE PHOSPHATE AND GUAIFENESIN 10; 100 MG/5ML; MG/5ML
5 SOLUTION ORAL EVERY 4 HOURS PRN
Qty: 210 ML | Refills: 0 | Status: SHIPPED | OUTPATIENT
Start: 2023-01-06 | End: 2023-08-07 | Stop reason: SDUPTHER

## 2023-01-06 RX ORDER — BUDESONIDE 0.5 MG/2ML
0.5 INHALANT ORAL 2 TIMES DAILY
Qty: 120 ML | Refills: 0 | Status: SHIPPED | OUTPATIENT
Start: 2023-01-06 | End: 2024-01-06

## 2023-01-06 RX ORDER — PREDNISONE 20 MG/1
TABLET ORAL
Qty: 12 TABLET | Refills: 0 | Status: SHIPPED | OUTPATIENT
Start: 2023-01-06 | End: 2023-08-07 | Stop reason: SDUPTHER

## 2023-01-06 NOTE — PROGRESS NOTES
1/6/2023    Michelle Frazier      Chief Complaint   Patient presents with    6m f/u    Medication Refill       HPI:   1/6/23- states asthma was well controlled for months, took a break from daily inahalers for 4 months, did have complaint of chest tightness and wheeze that returned. Restarted on daily Trelegy with benefit.   Had asthma exacerbation 3 weeks earlier while at work, improved with albuterol rescue inhaler. States she still does not feel back to normal. Has persistent chest tightness associated with hoarse voice, improves with albuterol inhaler. Using nebulizer nightly for past 3 weeks.   Having nocturnal coughing fits nightly, difficult to sleep.       7/6/2022- currently recovering from Gallbladder removal. States breathing is improved following surgery due to being away from office. On Trelegy daily with benefit.   No current complaint of cough, wheeze,  SOB- stable, only with exertion, not exerting herself due to recent surgery    3/24/2022- States asthma has improved while on Trelegy 200 daily, noticed her triggers are sudden changes in weather and strong smells.   Complaint of chest tightness- recurrent complaint, 3 times weekly, improves with albuterol rescue, worse when feeling stressed. Associated with wheeze.   Cough- non productive, worse when laying down, nocturnal arousals most nights of week.   Had pneumonia vaccine.     Had to take prednisone for asthma attack last January, felt much better after 3 days of prednisone.   Has asthma attack while showering due to exposure to , considered going to ER so severe, took multiple inhalation of albuterol inhaler to improve.        8/4/2021- States SOB is improved, improves with albuterol rescue inhaler 3x weekly, worse when noxious smells are inhaled such as fresh paint. Used inhaler this morning with chest tightness. On Trelegy with great benefit. Cough- improved, worse when sinus drain. No current complaint of wheeze.     5/4/2021- SOB-  currently a daily complaint, worse with exertion, lives with stairs and has to rest after walking up stairs.   Cough- worse at night, worsened in last 2 months, stopped taking Trelegy daily until January 2021. Improves with albuterol rescue having to use 2x daily. States worse when she smells strong odors or gets worse.   Associated with chest tightness and wheeze.     12/28/2020- rash is improved, no longer painful with scabbed blisters.   SOB- recurrent problem, improved during the day, Worse with exertion, has severe SOB when laying down at night. causes severe anxiety gasping for air 2x times in last 4 weeks.  Improved with albuterol rescue inhaler. Using oxygen at night,     11/24/2020- new onset rash, onset 5 days. Started with back ache that improved with over the counter aleve and a few red spots on lower back that spread across entire back and around to abdomen on left side. Describes as burning, tingling pins sticking in skin 7 on 0-10 pain scale.     SOB- worse when wearing mask and exertion walking up stairs, wearing supplemental oxygen at 2 L during day as needed.  Associated with chest tightness worse when bending over and fatigue.   No cough,     Dr. Fagan consult: 11/11 consult by Dr Fagan:   History of Present Illness:  Pt healthy, lives in [place] with .  Has good function, developed headaches and n/v/d last 10 days , takes in very little.  Voiding minimally.  Pt developed sob ppt er visit.  covid + on 11/4 .  Breathing well now on nc ox.  Headache severe, not eating. Plan:  November 11th temperature max 101.6, ferritin 458, creatinine 0.7, chest x-ray ground-glass opacity in the lateral left lung-right lung looks clear.   Will give couple liters d5 1/2.   norco for headahce prn   Discussed remdesivir and plasma- kodi late in course but may have some benefit- pt wishes to receive.  sterroids ordered. Discussed bipap/vent if needed.         The chief compliant  problem varies with instablilty  "at time    PFSH:  Past Medical History:   Diagnosis Date    Arthralgia of multiple joints 10/1/2020    Diverticulosis of intestine 10/1/2020    Dyslipidemia 10/1/2020    GERD (gastroesophageal reflux disease)     Hypothyroidism          Past Surgical History:   Procedure Laterality Date    APPENDECTOMY      CARPAL TUNNEL RELEASE Right     CHOLECYSTECTOMY      COLONOSCOPY      EYE SURGERY      cataracts    LAPAROSCOPIC CHOLECYSTECTOMY N/A 06/23/2022    Procedure: CHOLECYSTECTOMY, LAPAROSCOPIC;  Surgeon: Cristian Park Jr., MD;  Location: [facility];  Service: General;  Laterality: N/A;     Social History     Tobacco Use    Smoking status: Former     Types: Cigarettes     Quit date: 5/30/2012     Years since quitting: 10.6    Smokeless tobacco: Never   Substance Use Topics    Alcohol use: No    Drug use: No     Family History   Problem Relation Age of Onset    Arthritis Mother     Hearing loss Mother     Vision loss Mother     Arthritis Father     Colon cancer Paternal Grandmother         >80    Breast cancer Paternal Grandmother      Review of patient's allergies indicates:  No Known Allergies  I have reviewed past medical, family, and social history. I have reviewed previous nurse notes.    Performance Status:The patient's activity level is housebound activities.      Review of Systems:  a review of eleven systems covering constitutional, Eye, HEENT, Psych, Respiratory, Cardiac, GI, , Musculoskeletal, Endocrine, Dermatologic was negative except for pertinent findings as listed ABOVE and below: pertinent positive as above, rest is good  Shortness of breath, chest tightness, wheeze.          Exam:Comprehensive exam done. /85 (BP Location: Right arm, Patient Position: Sitting, BP Method: Medium (Automatic))   Pulse 70   Ht 5' 2" (1.575 m)   Wt 89.8 kg (197 lb 15.6 oz)   SpO2 98% Comment: on room air at rest  BMI 36.21 kg/m²   Exam included Vitals as listed, and patient's appearance and affect and " alertness and mood, oral exam for yeast and hygiene and pharynx lesions and Mallapatti (M) score, neck with inspection for jvd and masses and thyroid abnormalities and lymph nodes (supraclavicular and infraclavicular nodes and axillary also examined and noted if abn), chest exam included symmetry and effort and fremitus and percussion and auscultation, cardiac exam included rhythm and gallops and murmur and rubs and jvd and edema, abdominal exam for mass and hepatosplenomegaly and tenderness and hernias and bowel sounds, Musculoskeletal exam with muscle tone and posture and mobility/gait and  strength, and skin for rashes and cyanosis and pallor and turgor, extremity for clubbing.  Findings were normal except for pertinent findings listed below: M2, BS clear        Radiographs (ct chest and cxr) reviewed: view by direct vision   X-Ray Chest 1 View  11/13/2020   There is bibasilar atelectasis versus infiltrate new from prior exam.     X-Ray Chest AP Portable 11/11/2020   A peripheral interstitial infiltrate is identified in the left lung but not in the right lung.  This is an unusual pattern but viral pneumonitis is still suspected       Labs reviewed    Lab Results   Component Value Date    WBC 6.62 06/24/2022    RBC 4.30 06/24/2022    HGB 13.4 06/24/2022    HCT 40.4 06/24/2022    MCV 94 06/24/2022    MCH 31.2 (H) 06/24/2022    MCHC 33.2 06/24/2022    RDW 13.3 06/24/2022     06/24/2022    MPV 10.2 06/24/2022    GRAN 5.8 06/24/2022    GRAN 87.2 (H) 06/24/2022    LYMPH 0.7 (L) 06/24/2022    LYMPH 11.0 (L) 06/24/2022    MONO 0.1 (L) 06/24/2022    MONO 1.5 (L) 06/24/2022    EOS 0.0 06/24/2022    BASO 0.00 06/24/2022    EOSINOPHIL 0.0 06/24/2022    BASOPHIL 0.0 06/24/2022         PFT reviewed  Pulmonary Functions Testing Results:  Spirometry with bronchodilator, lung volume by gas dilution, and diffusion capacity measured December 29, 2020. The FEV1 to FVC ratio was 80% indicating no airflow obstruction was  measured by spirometry. The FEV1 was 117% of predicted at 2.5 L. There was    no improvement following bronchodilator. Total lung capacity on lung volume by gas dilution was 95% of predicted and normal. Diffusion also was normal at 81% predicted.Study is normal. Clinical correlation recommended. There was no bronchodilator   response measured.     Plan:  Clinical impression is apparently straight forward and impression with management as below.    Michelle was seen today for 6m f/u and medication refill.    Diagnoses and all orders for this visit:    Severe persistent asthma with acute exacerbation  -     predniSONE (DELTASONE) 20 MG tablet; Take one pill a day for three days, repeat for shortness of breath  -     budesonide (PULMICORT) 0.5 mg/2 mL nebulizer solution; Take 2 mLs (0.5 mg total) by nebulization 2 (two) times daily. Controller  -     X-Ray Chest PA And Lateral; Future  -     guaiFENesin-codeine 100-10 mg/5 ml (TUSSI-ORGANIDIN NR)  mg/5 mL syrup; Take 5 mLs by mouth every 4 (four) hours as needed for Cough.    Acute cough  -     guaiFENesin-codeine 100-10 mg/5 ml (TUSSI-ORGANIDIN NR)  mg/5 mL syrup; Take 5 mLs by mouth every 4 (four) hours as needed for Cough.        Follow up in about 6 months (around 7/6/2023), or if symptoms worsen or fail to improve.    Discussed with patient above for education the following:      Patient Instructions   Asthma Action plan    Prednisone 20 mg pills, Take one pill a day for three days, repeat for shortness of breath or wheeze    Albuterol Inhaler 1-2 puffs every 4 hours, for cough or shortness of breath    Budesonide every 12 hours in nebulizer machine  Use Albuterol every 4 hours in nebulizer machine.     Use codeine cough syrup sparingly, this will decrease cough and allow you to rest at night, do not take with other pain or sleeping medications.

## 2023-01-06 NOTE — PATIENT INSTRUCTIONS
Asthma Action plan    Prednisone 20 mg pills, Take one pill a day for three days, repeat for shortness of breath or wheeze    Albuterol Inhaler 1-2 puffs every 4 hours, for cough or shortness of breath    Budesonide every 12 hours in nebulizer machine  Use Albuterol every 4 hours in nebulizer machine.     Use codeine cough syrup sparingly, this will decrease cough and allow you to rest at night, do not take with other pain or sleeping medications.

## 2023-03-16 ENCOUNTER — HOSPITAL ENCOUNTER (EMERGENCY)
Facility: HOSPITAL | Age: 68
Discharge: HOME | End: 2023-03-16
Attending: EMERGENCY MEDICINE
Payer: MEDICARE

## 2023-03-16 VITALS
BODY MASS INDEX: 34.04 KG/M2 | RESPIRATION RATE: 18 BRPM | DIASTOLIC BLOOD PRESSURE: 71 MMHG | WEIGHT: 185 LBS | HEART RATE: 67 BPM | HEIGHT: 62 IN | SYSTOLIC BLOOD PRESSURE: 142 MMHG | OXYGEN SATURATION: 94 % | TEMPERATURE: 100 F

## 2023-03-16 DIAGNOSIS — B34.9 VIRAL SYNDROME: Primary | ICD-10-CM

## 2023-03-16 LAB — SARS-COV-2 RDRP RESP QL NAA+PROBE: NEGATIVE

## 2023-03-16 PROCEDURE — U0002 COVID-19 LAB TEST NON-CDC: HCPCS | Performed by: EMERGENCY MEDICINE

## 2023-03-16 PROCEDURE — 25000003 PHARM REV CODE 250: Performed by: EMERGENCY MEDICINE

## 2023-03-16 PROCEDURE — 99283 EMERGENCY DEPT VISIT LOW MDM: CPT

## 2023-03-16 RX ORDER — LOPERAMIDE HYDROCHLORIDE 2 MG/1
2 CAPSULE ORAL
Status: COMPLETED | OUTPATIENT
Start: 2023-03-16 | End: 2023-03-16

## 2023-03-16 RX ORDER — ONDANSETRON 4 MG/1
4 TABLET, ORALLY DISINTEGRATING ORAL
Status: COMPLETED | OUTPATIENT
Start: 2023-03-16 | End: 2023-03-16

## 2023-03-16 RX ORDER — ONDANSETRON 4 MG/1
4 TABLET, ORALLY DISINTEGRATING ORAL EVERY 6 HOURS PRN
Qty: 30 TABLET | Refills: 0 | Status: SHIPPED | OUTPATIENT
Start: 2023-03-16 | End: 2023-04-26 | Stop reason: SDUPTHER

## 2023-03-16 RX ADMIN — ONDANSETRON 4 MG: 4 TABLET, ORALLY DISINTEGRATING ORAL at 09:03

## 2023-03-16 RX ADMIN — LOPERAMIDE HYDROCHLORIDE 2 MG: 2 CAPSULE ORAL at 09:03

## 2023-03-17 NOTE — ED PROVIDER NOTES
Encounter Date: 3/16/2023       History     Chief Complaint   Patient presents with    COVID-19 Concerns     Coworker tested + has sinus issues and diarrhea      67-year-old female history of COVID with prior hospitalization presents with 1 day of sore throat, diarrhea and rhinorrhea.  Co-worker has tested positive for COVID.  Patient denies fevers or chills or shortness of breath.    The history is provided by the patient.   Review of patient's allergies indicates:  No Known Allergies  Past Medical History:   Diagnosis Date    Arthralgia of multiple joints 10/1/2020    Diverticulosis of intestine 10/1/2020    Dyslipidemia 10/1/2020    GERD (gastroesophageal reflux disease)     Hypothyroidism      Past Surgical History:   Procedure Laterality Date    APPENDECTOMY      CARPAL TUNNEL RELEASE Right     CHOLECYSTECTOMY      COLONOSCOPY      EYE SURGERY      cataracts    LAPAROSCOPIC CHOLECYSTECTOMY N/A 06/23/2022    Procedure: CHOLECYSTECTOMY, LAPAROSCOPIC;  Surgeon: Cristian Park Jr., MD;  [facility];  Service: General;  Laterality: N/A;     Family History   Problem Relation Age of Onset    Arthritis Mother     Hearing loss Mother     Vision loss Mother     Arthritis Father     Colon cancer Paternal Grandmother         >80    Breast cancer Paternal Grandmother      Social History     Tobacco Use    Smoking status: Former     Types: Cigarettes     Quit date: 5/30/2012     Years since quitting: 10.8    Smokeless tobacco: Never   Substance Use Topics    Alcohol use: No    Drug use: No     Review of Systems   Constitutional: Negative.    HENT:  Positive for rhinorrhea and sore throat.    Respiratory: Negative.     Gastrointestinal:  Positive for diarrhea.     Physical Exam     Initial Vitals [03/16/23 2020]   BP Pulse Resp Temp SpO2   (!) 195/93 88 18 98.2 °F (36.8 °C) 98 %      MAP       --         Physical Exam    Nursing note and vitals reviewed.  Constitutional: She appears well-developed and  well-nourished. She is not diaphoretic. No distress.   HENT:   Head: Normocephalic and atraumatic.   Eyes: EOM are normal.   Neck: Neck supple.   Normal range of motion.  Cardiovascular:  Normal rate, regular rhythm and normal heart sounds.     Exam reveals no gallop and no friction rub.       No murmur heard.  Pulmonary/Chest: Breath sounds normal. No respiratory distress. She has no wheezes. She has no rhonchi. She has no rales.   Musculoskeletal:         General: Normal range of motion.      Cervical back: Normal range of motion and neck supple.     Neurological: She is alert and oriented to person, place, and time.   Skin: Skin is warm and dry.   Psychiatric: She has a normal mood and affect. Her behavior is normal. Judgment and thought content normal.       ED Course   Procedures  Labs Reviewed   SARS-COV-2 RNA AMPLIFICATION, QUAL   HIV 1 / 2 ANTIBODY   HEPATITIS C ANTIBODY          Imaging Results    None          Medications   ondansetron disintegrating tablet 4 mg (4 mg Oral Given 3/16/23 2104)   loperamide capsule 2 mg (2 mg Oral Given 3/16/23 2104)     Medical Decision Making:   Clinical Tests:   Lab Tests: Ordered and Reviewed           ED Course as of 03/16/23 2151   Thu Mar 16, 2023   2046 BP(!): 195/93 [EF]   2046 Temp: 98.2 °F (36.8 °C) [EF]   2046 Temp Source: Oral [EF]   2046 Pulse: 88 [EF]   2046 Resp: 18 [EF]   2046 SpO2: 98 % [EF]   2114 SARS-CoV-2 RNA, Amplification, Qual: Negative [EF]      ED Course User Index  [EF] Urbano Desai MD                 Clinical Impression:   Final diagnoses:  [B34.9] Viral syndrome (Primary)        ED Disposition Condition    Discharge Stable          ED Prescriptions       Medication Sig Dispense Start Date End Date Auth. Provider    ondansetron (ZOFRAN-ODT) 4 MG TbDL Take 1 tablet (4 mg total) by mouth every 6 (six) hours as needed (n/v). 30 tablet 3/16/2023 -- Urbano Desai MD          Follow-up Information       Follow up With Specialties Details Why  Contact [facility] Emergency Medicine  As needed, If symptoms worsen [address] [place] [id number] [phone]            67-year-old presents to the emergency room with some diarrhea and slight sore throat.  Co-worker positive for COVID but her test is negative.  She can be discharged home.     Urbano Desai MD  03/16/23 1862

## 2023-04-25 ENCOUNTER — TELEPHONE (OUTPATIENT)
Dept: FAMILY MEDICINE | Facility: CLINIC | Age: 68
End: 2023-04-25

## 2023-04-25 ENCOUNTER — PATIENT MESSAGE (OUTPATIENT)
Dept: FAMILY MEDICINE | Facility: CLINIC | Age: 68
End: 2023-04-25

## 2023-04-25 DIAGNOSIS — K21.9 GASTROESOPHAGEAL REFLUX DISEASE, UNSPECIFIED WHETHER ESOPHAGITIS PRESENT: ICD-10-CM

## 2023-04-25 DIAGNOSIS — E78.00 PURE HYPERCHOLESTEROLEMIA: ICD-10-CM

## 2023-04-25 DIAGNOSIS — E03.9 ACQUIRED HYPOTHYROIDISM: Primary | ICD-10-CM

## 2023-04-25 NOTE — TELEPHONE ENCOUNTER
----- Message from Victoria Herrmann LPN sent at 4/25/2023  9:19 AM CDT -----  Regarding: FW: lab orders    ----- Message -----  From: Hazel Begum  Sent: 4/25/2023   9:17 AM CDT  To: [facility] Staff  Subject: lab orders                                       [facility]

## 2023-04-26 ENCOUNTER — OFFICE VISIT (OUTPATIENT)
Dept: FAMILY MEDICINE | Facility: CLINIC | Age: 68
End: 2023-04-26
Payer: MEDICARE

## 2023-04-26 VITALS
TEMPERATURE: 99 F | BODY MASS INDEX: 36.99 KG/M2 | OXYGEN SATURATION: 96 % | HEIGHT: 62 IN | DIASTOLIC BLOOD PRESSURE: 86 MMHG | HEART RATE: 92 BPM | SYSTOLIC BLOOD PRESSURE: 130 MMHG | WEIGHT: 201 LBS

## 2023-04-26 DIAGNOSIS — E03.9 ACQUIRED HYPOTHYROIDISM: Primary | ICD-10-CM

## 2023-04-26 DIAGNOSIS — K21.9 GASTROESOPHAGEAL REFLUX DISEASE, UNSPECIFIED WHETHER ESOPHAGITIS PRESENT: ICD-10-CM

## 2023-04-26 DIAGNOSIS — J45.909 ASTHMA, UNSPECIFIED ASTHMA SEVERITY, UNSPECIFIED WHETHER COMPLICATED, UNSPECIFIED WHETHER PERSISTENT: ICD-10-CM

## 2023-04-26 DIAGNOSIS — Z23 NEED FOR PROPHYLACTIC VACCINATION AGAINST STREPTOCOCCUS PNEUMONIAE (PNEUMOCOCCUS) AND INFLUENZA: ICD-10-CM

## 2023-04-26 DIAGNOSIS — Z78.9 STATIN INTOLERANCE: ICD-10-CM

## 2023-04-26 DIAGNOSIS — Z12.31 BREAST CANCER SCREENING BY MAMMOGRAM: ICD-10-CM

## 2023-04-26 PROCEDURE — 90677 PCV20 VACCINE IM: CPT | Mod: S$GLB,,, | Performed by: INTERNAL MEDICINE

## 2023-04-26 PROCEDURE — 99214 PR OFFICE/OUTPT VISIT, EST, LEVL IV, 30-39 MIN: ICD-10-PCS | Mod: S$GLB,,, | Performed by: INTERNAL MEDICINE

## 2023-04-26 PROCEDURE — G0009 PNEUMOCOCCAL CONJUGATE VACCINE 20-VALENT: ICD-10-PCS | Mod: S$GLB,,, | Performed by: INTERNAL MEDICINE

## 2023-04-26 PROCEDURE — 99214 OFFICE O/P EST MOD 30 MIN: CPT | Mod: S$GLB,,, | Performed by: INTERNAL MEDICINE

## 2023-04-26 PROCEDURE — G0009 ADMIN PNEUMOCOCCAL VACCINE: HCPCS | Mod: S$GLB,,, | Performed by: INTERNAL MEDICINE

## 2023-04-26 PROCEDURE — 90677 PNEUMOCOCCAL CONJUGATE VACCINE 20-VALENT: ICD-10-PCS | Mod: S$GLB,,, | Performed by: INTERNAL MEDICINE

## 2023-04-26 RX ORDER — FAMOTIDINE 20 MG/1
20 TABLET, FILM COATED ORAL 2 TIMES DAILY
Qty: 60 TABLET | Refills: 1 | Status: SHIPPED | OUTPATIENT
Start: 2023-04-26 | End: 2023-12-20

## 2023-04-26 RX ORDER — PANTOPRAZOLE SODIUM 40 MG/1
40 TABLET, DELAYED RELEASE ORAL DAILY
Qty: 90 TABLET | Refills: 1 | Status: SHIPPED | OUTPATIENT
Start: 2023-04-26 | End: 2023-12-01 | Stop reason: SDUPTHER

## 2023-04-26 RX ORDER — LEVOTHYROXINE SODIUM 50 UG/1
50 TABLET ORAL DAILY
Qty: 90 TABLET | Refills: 1 | Status: SHIPPED | OUTPATIENT
Start: 2023-04-26 | End: 2023-12-01 | Stop reason: SDUPTHER

## 2023-04-26 NOTE — PROGRESS NOTES
Subjective:       Patient ID: Michelle Frazier is a 67 y.o. female.    Chief Complaint: Annual Exam    Here for routine follow up; last visit note, most recent available labs, and health maintenance topics reviewed.     Thyroid Problem  Presents for follow-up visit. Patient reports no anxiety, cold intolerance, constipation, diaphoresis, diarrhea, fatigue, heat intolerance, menstrual problem, palpitations or tremors. The symptoms have been stable.   Review of Systems   Constitutional:  Negative for activity change, appetite change, chills, diaphoresis, fatigue, fever and unexpected weight change.   HENT:  Negative for congestion, ear discharge, ear pain, hearing loss, mouth sores, nosebleeds, postnasal drip, rhinorrhea, sinus pressure, sinus pain, sneezing, sore throat, tinnitus, trouble swallowing and voice change.    Eyes:  Negative for photophobia, pain, discharge, redness, itching and visual disturbance.   Respiratory:  Negative for apnea, cough, choking, chest tightness, shortness of breath and wheezing.    Cardiovascular:  Negative for chest pain, palpitations and leg swelling.   Gastrointestinal:  Negative for abdominal distention, abdominal pain, blood in stool, constipation, diarrhea, nausea and vomiting.   Endocrine: Negative for cold intolerance, heat intolerance, polydipsia and polyuria.   Genitourinary:  Negative for decreased urine volume, difficulty urinating, dyspareunia, dysuria, enuresis, frequency, genital sores, hematuria, menstrual problem, pelvic pain, urgency, vaginal bleeding, vaginal discharge and vaginal pain.   Musculoskeletal:  Negative for arthralgias, back pain, gait problem, joint swelling, myalgias, neck pain and neck stiffness.   Skin:  Negative for rash and wound.   Allergic/Immunologic: Negative for environmental allergies, food allergies and immunocompromised state.   Neurological:  Negative for dizziness, tremors, seizures, syncope, facial asymmetry, speech difficulty, weakness,  light-headedness, numbness and headaches.   Hematological:  Negative for adenopathy. Does not bruise/bleed easily.   Psychiatric/Behavioral:  Negative for confusion, decreased concentration, hallucinations, self-injury, sleep disturbance and suicidal ideas. The patient is not nervous/anxious.      Past Medical History:   Diagnosis Date    Arthralgia of multiple joints 10/1/2020    Diverticulosis of intestine 10/1/2020    Dyslipidemia 10/1/2020    GERD (gastroesophageal reflux disease)     Hypothyroidism       Past Surgical History:   Procedure Laterality Date    APPENDECTOMY      CARPAL TUNNEL RELEASE Right     CHOLECYSTECTOMY      COLONOSCOPY      EYE SURGERY      cataracts    LAPAROSCOPIC CHOLECYSTECTOMY N/A 06/23/2022    Procedure: CHOLECYSTECTOMY, LAPAROSCOPIC;  Surgeon: Cristian Park Jr., MD;  Location: [facility];  Service: General;  Laterality: N/A;       Family History   Problem Relation Age of Onset    Arthritis Mother     Hearing loss Mother     Vision loss Mother     Arthritis Father     Colon cancer Paternal Grandmother         >80    Breast cancer Paternal Grandmother        Social History     Socioeconomic History    Marital status:    Tobacco Use    Smoking status: Former     Types: Cigarettes     Quit date: 5/30/2012     Years since quitting: 10.9    Smokeless tobacco: Never   Substance and Sexual Activity    Alcohol use: No    Drug use: No    Sexual activity: Yes     Partners: Male     Birth control/protection: Post-menopausal     Comment:    Social History Narrative    Live with        Current Outpatient Medications   Medication Sig Dispense Refill    albuterol (PROVENTIL/VENTOLIN HFA) 90 mcg/actuation inhaler Inhale 2 puffs into the lungs every 4 (four) hours as needed for Wheezing or Shortness of Breath. 18 g 11    budesonide (PULMICORT) 0.5 mg/2 mL nebulizer solution Take 2 mLs (0.5 mg total) by nebulization 2 (two) times daily. Controller 120 mL 0    diclofenac sodium  "(VOLTAREN) 1 % Gel Apply 2 g topically once daily.      fluticasone-umeclidin-vilanter (TRELEGY ELLIPTA) 200-62.5-25 mcg inhaler Inhale 1 puff into the lungs once daily. 180 each 3    HYDROcodone-acetaminophen (NORCO) 5-325 mg per tablet Take 1 tablet by mouth every 6 (six) hours as needed for Pain. 20 tablet 0    ondansetron (ZOFRAN-ODT) 4 MG TbDL Take 1 tablet (4 mg total) by mouth every 6 (six) hours as needed (nausea or vomiting). 30 tablet 0    predniSONE (DELTASONE) 20 MG tablet Take one pill a day for three days, repeat for shortness of breath 12 tablet 0    famotidine (PEPCID) 20 MG tablet Take 1 tablet (20 mg total) by mouth 2 (two) times daily. 60 tablet 1    levothyroxine (SYNTHROID) 50 MCG tablet Take 1 tablet (50 mcg total) by mouth once daily. 90 tablet 1    pantoprazole (PROTONIX) 40 MG tablet Take 1 tablet (40 mg total) by mouth once daily. 90 tablet 1     No current facility-administered medications for this visit.       Review of patient's allergies indicates:  No Known Allergies  Objective:      Blood pressure 130/86, pulse 92, temperature 98.6 °F (37 °C), temperature source Temporal, height 5' 2" (1.575 m), weight 91.2 kg (201 lb), SpO2 96 %. Body mass index is 36.76 kg/m².   Physical Exam  Vitals and nursing note reviewed.   Constitutional:       General: She is not in acute distress.     Appearance: She is well-developed. She is obese. She is not ill-appearing, toxic-appearing or diaphoretic.   HENT:      Head: Normocephalic and atraumatic.   Eyes:      General: No scleral icterus.        Right eye: No discharge.         Left eye: No discharge.      Conjunctiva/sclera: Conjunctivae normal.   Neck:      Vascular: No carotid bruit.   Cardiovascular:      Rate and Rhythm: Normal rate and regular rhythm.      Heart sounds: Normal heart sounds. No murmur heard.  Pulmonary:      Effort: Pulmonary effort is normal. No respiratory distress.      Breath sounds: Normal breath sounds. No decreased breath " sounds, wheezing, rhonchi or rales.   Abdominal:      General: There is no distension.      Palpations: Abdomen is soft.      Tenderness: There is no abdominal tenderness. There is no guarding or rebound.   Musculoskeletal:      Right lower leg: No edema.      Left lower leg: No edema.   Skin:     General: Skin is warm and dry.   Neurological:      Mental Status: She is alert.      Motor: No tremor.   Psychiatric:         Mood and Affect: Mood normal.         Speech: Speech normal.         Behavior: Behavior normal.           Assessment:       1. Acquired hypothyroidism    2. Gastroesophageal reflux disease, unspecified whether esophagitis present    3. Breast cancer screening by mammogram    4. Need for prophylactic vaccination against Streptococcus pneumoniae (pneumococcus) and influenza    5. Statin intolerance    6. Asthma, unspecified asthma severity, unspecified whether complicated, unspecified whether persistent        Plan:       Michelle was seen today for annual exam.    Diagnoses and all orders for this visit:    Acquired hypothyroidism  Comments:  Labs ordered yesterday but hasn't had a chance to get them yet.   Orders:  -     levothyroxine (SYNTHROID) 50 MCG tablet; Take 1 tablet (50 mcg total) by mouth once daily.    Gastroesophageal reflux disease, unspecified whether esophagitis present  Comments:  Controlled with PPI.  Hasn't tried H2 blocker recently.  Will give a trial to see if we can get her off PPI.  Orders:  -     pantoprazole (PROTONIX) 40 MG tablet; Take 1 tablet (40 mg total) by mouth once daily.    Breast cancer screening by mammogram  -     Mammo Digital Screening Bilat w/ Jerad; Future    Need for prophylactic vaccination against Streptococcus pneumoniae (pneumococcus) and influenza  -     Pneumococcal Conjugate Vaccine (20 Valent) (IM)    Statin intolerance  Comments:  Has also has side effects with Zetia    Asthma, unspecified asthma severity, unspecified whether complicated, unspecified  whether persistent  Comments:  Followed by Pulm    Other orders  -     famotidine (PEPCID) 20 MG tablet; Take 1 tablet (20 mg total) by mouth 2 (two) times daily.

## 2023-05-23 ENCOUNTER — PATIENT MESSAGE (OUTPATIENT)
Dept: RESEARCH | Facility: HOSPITAL | Age: 68
End: 2023-05-23
Payer: MEDICARE

## 2023-05-24 ENCOUNTER — HOSPITAL ENCOUNTER (OUTPATIENT)
Dept: RADIOLOGY | Facility: HOSPITAL | Age: 68
Discharge: HOME | End: 2023-05-24
Attending: INTERNAL MEDICINE
Payer: MEDICARE

## 2023-05-24 ENCOUNTER — HOSPITAL ENCOUNTER (OUTPATIENT)
Dept: RADIOLOGY | Facility: HOSPITAL | Age: 68
Discharge: HOME | End: 2023-05-24
Attending: NURSE PRACTITIONER
Payer: MEDICARE

## 2023-05-24 VITALS — HEIGHT: 62 IN | BODY MASS INDEX: 37 KG/M2 | WEIGHT: 201.06 LBS

## 2023-05-24 DIAGNOSIS — J45.51 SEVERE PERSISTENT ASTHMA WITH ACUTE EXACERBATION: ICD-10-CM

## 2023-05-24 DIAGNOSIS — Z12.31 BREAST CANCER SCREENING BY MAMMOGRAM: ICD-10-CM

## 2023-05-24 PROCEDURE — 77067 SCR MAMMO BI INCL CAD: CPT | Mod: TC,PO

## 2023-05-24 PROCEDURE — 71046 X-RAY EXAM CHEST 2 VIEWS: CPT | Mod: TC,PO

## 2023-08-07 ENCOUNTER — OFFICE VISIT (OUTPATIENT)
Dept: PULMONOLOGY | Facility: CLINIC | Age: 68
End: 2023-08-07
Payer: MEDICARE

## 2023-08-07 VITALS
SYSTOLIC BLOOD PRESSURE: 139 MMHG | OXYGEN SATURATION: 96 % | WEIGHT: 204.06 LBS | BODY MASS INDEX: 37.55 KG/M2 | HEIGHT: 62 IN | DIASTOLIC BLOOD PRESSURE: 76 MMHG | HEART RATE: 78 BPM

## 2023-08-07 DIAGNOSIS — J45.50 SEVERE PERSISTENT ASTHMA WITHOUT COMPLICATION: Primary | ICD-10-CM

## 2023-08-07 DIAGNOSIS — R05.1 ACUTE COUGH: ICD-10-CM

## 2023-08-07 PROCEDURE — 99213 OFFICE O/P EST LOW 20 MIN: CPT | Mod: S$PBB,,, | Performed by: NURSE PRACTITIONER

## 2023-08-07 PROCEDURE — 99213 PR OFFICE/OUTPT VISIT, EST, LEVL III, 20-29 MIN: ICD-10-PCS | Mod: S$PBB,,, | Performed by: NURSE PRACTITIONER

## 2023-08-07 PROCEDURE — 99213 OFFICE O/P EST LOW 20 MIN: CPT | Mod: PBBFAC,PO | Performed by: NURSE PRACTITIONER

## 2023-08-07 PROCEDURE — 99999 PR PBB SHADOW E&M-EST. PATIENT-LVL III: ICD-10-PCS | Mod: PBBFAC,,, | Performed by: NURSE PRACTITIONER

## 2023-08-07 PROCEDURE — 99999 PR PBB SHADOW E&M-EST. PATIENT-LVL III: CPT | Mod: PBBFAC,,, | Performed by: NURSE PRACTITIONER

## 2023-08-07 RX ORDER — PREDNISONE 20 MG/1
TABLET ORAL
Qty: 12 TABLET | Refills: 0 | Status: SHIPPED | OUTPATIENT
Start: 2023-08-07 | End: 2024-03-13 | Stop reason: SDUPTHER

## 2023-08-07 RX ORDER — FLUTICASONE FUROATE, UMECLIDINIUM BROMIDE AND VILANTEROL TRIFENATATE 200; 62.5; 25 UG/1; UG/1; UG/1
1 POWDER RESPIRATORY (INHALATION) DAILY
Qty: 180 EACH | Refills: 3 | Status: SHIPPED | OUTPATIENT
Start: 2023-08-07

## 2023-08-07 RX ORDER — CODEINE PHOSPHATE AND GUAIFENESIN 10; 100 MG/5ML; MG/5ML
5 SOLUTION ORAL EVERY 4 HOURS PRN
Qty: 210 ML | Refills: 0 | Status: SHIPPED | OUTPATIENT
Start: 2023-08-07 | End: 2024-03-13 | Stop reason: SDUPTHER

## 2023-08-07 RX ORDER — ALBUTEROL SULFATE 90 UG/1
2 AEROSOL, METERED RESPIRATORY (INHALATION) EVERY 4 HOURS PRN
Qty: 18 G | Refills: 11 | Status: SHIPPED | OUTPATIENT
Start: 2023-08-07

## 2023-08-07 NOTE — PROGRESS NOTES
8/7/2023    Michelle Frazier      Chief Complaint   Patient presents with    6m f/u    Asthma       HPI:   8/7/2023-noticed improvement in breathing after transferring to [facility] from other office. On Trelegy daily with benefit. Needing albuterol rescue 1-2 x weekly only when triggered with noxious smells such as lysol or perfume.   Has cough syrup needing 1-2 times monthly for coughing fits.     No asthma exacerbations since last visit.       1/6/23- states asthma was well controlled for months, took a break from daily inahalers for 4 months, did have complaint of chest tightness and wheeze that returned. Restarted on daily Trelegy with benefit.   Had asthma exacerbation 3 weeks earlier while at work, improved with albuterol rescue inhaler. States she still does not feel back to normal. Has persistent chest tightness associated with hoarse voice, improves with albuterol inhaler. Using nebulizer nightly for past 3 weeks.   Having nocturnal coughing fits nightly, difficult to sleep.       7/6/2022- currently recovering from Gallbladder removal. States breathing is improved following surgery due to being away from office. On Trelegy daily with benefit.   No current complaint of cough, wheeze,  SOB- stable, only with exertion, not exerting herself due to recent surgery    3/24/2022- States asthma has improved while on Trelegy 200 daily, noticed her triggers are sudden changes in weather and strong smells.   Complaint of chest tightness- recurrent complaint, 3 times weekly, improves with albuterol rescue, worse when feeling stressed. Associated with wheeze.   Cough- non productive, worse when laying down, nocturnal arousals most nights of week.   Had pneumonia vaccine.     Had to take prednisone for asthma attack last January, felt much better after 3 days of prednisone.   Has asthma attack while showering due to exposure to , considered going to ER so severe, took multiple inhalation of albuterol inhaler to  improve.        8/4/2021- States SOB is improved, improves with albuterol rescue inhaler 3x weekly, worse when noxious smells are inhaled such as fresh paint. Used inhaler this morning with chest tightness. On Trelegy with great benefit. Cough- improved, worse when sinus drain. No current complaint of wheeze.     5/4/2021- SOB- currently a daily complaint, worse with exertion, lives with stairs and has to rest after walking up stairs.   Cough- worse at night, worsened in last 2 months, stopped taking Trelegy daily until January 2021. Improves with albuterol rescue having to use 2x daily. States worse when she smells strong odors or gets worse.   Associated with chest tightness and wheeze.     12/28/2020- rash is improved, no longer painful with scabbed blisters.   SOB- recurrent problem, improved during the day, Worse with exertion, has severe SOB when laying down at night. causes severe anxiety gasping for air 2x times in last 4 weeks.  Improved with albuterol rescue inhaler. Using oxygen at night,     11/24/2020- new onset rash, onset 5 days. Started with back ache that improved with over the counter aleve and a few red spots on lower back that spread across entire back and around to abdomen on left side. Describes as burning, tingling pins sticking in skin 7 on 0-10 pain scale.     SOB- worse when wearing mask and exertion walking up stairs, wearing supplemental oxygen at 2 L during day as needed.  Associated with chest tightness worse when bending over and fatigue.   No cough,     Dr. Fagan consult: 11/11 consult by Dr Fagan:   History of Present Illness:  Pt healthy, lives in [place] with .  Has good function, developed headaches and n/v/d last 10 days , takes in very little.  Voiding minimally.  Pt developed sob ppt er visit.  covid + on 11/4 .  Breathing well now on nc ox.  Headache severe, not eating. Plan:  November 11th temperature max 101.6, ferritin 458, creatinine 0.7, chest x-ray ground-glass  opacity in the lateral left lung-right lung looks clear.   Will give couple liters d5 .   norco for headahce prn   Discussed remdesivir and plasma- kodi late in course but may have some benefit- pt wishes to receive.  sterroids ordered. Discussed bipap/vent if needed.         The chief compliant  problem varies with instablilty at time    PFSH:  Past Medical History:   Diagnosis Date    Arthralgia of multiple joints 10/1/2020    Diverticulosis of intestine 10/1/2020    Dyslipidemia 10/1/2020    GERD (gastroesophageal reflux disease)     Hypothyroidism          Past Surgical History:   Procedure Laterality Date    APPENDECTOMY      CARPAL TUNNEL RELEASE Right     CHOLECYSTECTOMY      COLONOSCOPY      EYE SURGERY      cataracts    LAPAROSCOPIC CHOLECYSTECTOMY N/A 2022    Procedure: CHOLECYSTECTOMY, LAPAROSCOPIC;  Surgeon: Cristian Park Jr., MD;  Location: [facility];  Service: General;  Laterality: N/A;     Social History     Tobacco Use    Smoking status: Former     Current packs/day: 0.00     Types: Cigarettes     Quit date: 2012     Years since quittin.1    Smokeless tobacco: Never   Substance Use Topics    Alcohol use: No    Drug use: No     Family History   Problem Relation Age of Onset    Arthritis Mother     Hearing loss Mother     Vision loss Mother     Arthritis Father     Colon cancer Paternal Grandmother         >80    Breast cancer Paternal Grandmother 65     Review of patient's allergies indicates:  No Known Allergies  I have reviewed past medical, family, and social history. I have reviewed previous nurse notes.    Performance Status:The patient's activity level is housebound activities.      Review of Systems:  a review of eleven systems covering constitutional, Eye, HEENT, Psych, Respiratory, Cardiac, GI, , Musculoskeletal, Endocrine, Dermatologic was negative except for pertinent findings as listed ABOVE and below: pertinent positive as above, rest is good  Shortness of  "breath, chest tightness, wheeze.          Exam:Comprehensive exam done. /76 (BP Location: Right arm, Patient Position: Sitting, BP Method: Medium (Automatic))   Pulse 78   Ht 5' 2" (1.575 m)   Wt 92.5 kg (204 lb 0.6 oz)   SpO2 96% Comment: on room air at rest  BMI 37.32 kg/m²   Exam included Vitals as listed, and patient's appearance and affect and alertness and mood, oral exam for yeast and hygiene and pharynx lesions and Mallapatti (M) score, neck with inspection for jvd and masses and thyroid abnormalities and lymph nodes (supraclavicular and infraclavicular nodes and axillary also examined and noted if abn), chest exam included symmetry and effort and fremitus and percussion and auscultation, cardiac exam included rhythm and gallops and murmur and rubs and jvd and edema, abdominal exam for mass and hepatosplenomegaly and tenderness and hernias and bowel sounds, Musculoskeletal exam with muscle tone and posture and mobility/gait and  strength, and skin for rashes and cyanosis and pallor and turgor, extremity for clubbing.  Findings were normal except for pertinent findings listed below: M2, BS clear        Radiographs (ct chest and cxr) reviewed: view by direct vision   X-Ray Chest 1 View  11/13/2020   There is bibasilar atelectasis versus infiltrate new from prior exam.     X-Ray Chest AP Portable 11/11/2020   A peripheral interstitial infiltrate is identified in the left lung but not in the right lung.  This is an unusual pattern but viral pneumonitis is still suspected       Labs reviewed    Lab Results   Component Value Date    WBC 6.62 06/24/2022    RBC 4.30 06/24/2022    HGB 13.4 06/24/2022    HCT 40.4 06/24/2022    MCV 94 06/24/2022    MCH 31.2 (H) 06/24/2022    MCHC 33.2 06/24/2022    RDW 13.3 06/24/2022     06/24/2022    MPV 10.2 06/24/2022    GRAN 5.8 06/24/2022    GRAN 87.2 (H) 06/24/2022    LYMPH 0.7 (L) 06/24/2022    LYMPH 11.0 (L) 06/24/2022    MONO 0.1 (L) 06/24/2022    MONO " 1.5 (L) 06/24/2022    EOS 0.0 06/24/2022    BASO 0.00 06/24/2022    EOSINOPHIL 0.0 06/24/2022    BASOPHIL 0.0 06/24/2022         PFT reviewed  Pulmonary Functions Testing Results:  Spirometry with bronchodilator, lung volume by gas dilution, and diffusion capacity measured December 29, 2020. The FEV1 to FVC ratio was 80% indicating no airflow obstruction was measured by spirometry. The FEV1 was 117% of predicted at 2.5 L. There was    no improvement following bronchodilator. Total lung capacity on lung volume by gas dilution was 95% of predicted and normal. Diffusion also was normal at 81% predicted.Study is normal. Clinical correlation recommended. There was no bronchodilator   response measured.     Plan:  Clinical impression is apparently straight forward and impression with management as below.    Michelle was seen today for 6m f/u and asthma.    Diagnoses and all orders for this visit:    Severe persistent asthma without complication  -     fluticasone-umeclidin-vilanter (TRELEGY ELLIPTA) 200-62.5-25 mcg inhaler; Inhale 1 puff into the lungs once daily.  -     albuterol (PROVENTIL/VENTOLIN HFA) 90 mcg/actuation inhaler; Inhale 2 puffs into the lungs every 4 (four) hours as needed for Wheezing or Shortness of Breath.  -     guaiFENesin-codeine 100-10 mg/5 ml (TUSSI-ORGANIDIN NR)  mg/5 mL syrup; Take 5 mLs by mouth every 4 (four) hours as needed for Cough.  -     predniSONE (DELTASONE) 20 MG tablet; Take one pill a day for three days, repeat for shortness of breath    Acute cough  -     guaiFENesin-codeine 100-10 mg/5 ml (TUSSI-ORGANIDIN NR)  mg/5 mL syrup; Take 5 mLs by mouth every 4 (four) hours as needed for Cough.        Follow up in about 6 months (around 2/7/2024), or if symptoms worsen or fail to improve.    Discussed with patient above for education the following:      Patient Instructions   Continue nebulizer therapy.     Continue current prescription medication regiment.

## 2023-12-01 DIAGNOSIS — E03.9 ACQUIRED HYPOTHYROIDISM: ICD-10-CM

## 2023-12-01 DIAGNOSIS — K21.9 GASTROESOPHAGEAL REFLUX DISEASE, UNSPECIFIED WHETHER ESOPHAGITIS PRESENT: ICD-10-CM

## 2023-12-04 ENCOUNTER — PATIENT MESSAGE (OUTPATIENT)
Dept: FAMILY MEDICINE | Facility: CLINIC | Age: 68
End: 2023-12-04

## 2023-12-04 DIAGNOSIS — K21.9 GASTROESOPHAGEAL REFLUX DISEASE, UNSPECIFIED WHETHER ESOPHAGITIS PRESENT: Primary | ICD-10-CM

## 2023-12-04 DIAGNOSIS — E78.00 PURE HYPERCHOLESTEROLEMIA: ICD-10-CM

## 2023-12-04 DIAGNOSIS — E03.9 ACQUIRED HYPOTHYROIDISM: ICD-10-CM

## 2023-12-04 RX ORDER — LEVOTHYROXINE SODIUM 50 UG/1
50 TABLET ORAL DAILY
Qty: 90 TABLET | Refills: 0 | Status: SHIPPED | OUTPATIENT
Start: 2023-12-04 | End: 2023-12-20 | Stop reason: SDUPTHER

## 2023-12-04 RX ORDER — PANTOPRAZOLE SODIUM 40 MG/1
40 TABLET, DELAYED RELEASE ORAL DAILY
Qty: 90 TABLET | Refills: 0 | Status: SHIPPED | OUTPATIENT
Start: 2023-12-04 | End: 2023-12-20 | Stop reason: SDUPTHER

## 2023-12-09 LAB
ALBUMIN SERPL-MCNC: 4.3 G/DL (ref 3.9–4.9)
ALBUMIN/GLOB SERPL: 2.4 {RATIO} (ref 1.2–2.2)
ALP SERPL-CCNC: 128 IU/L (ref 44–121)
ALT SERPL-CCNC: 20 IU/L (ref 0–32)
AST SERPL-CCNC: 20 IU/L (ref 0–40)
BASOPHILS # BLD AUTO: 0.1 X10E3/UL (ref 0–0.2)
BASOPHILS NFR BLD AUTO: 1 %
BILIRUB SERPL-MCNC: 0.3 MG/DL (ref 0–1.2)
BUN SERPL-MCNC: 12 MG/DL (ref 8–27)
BUN/CREAT SERPL: 11 (ref 12–28)
CALCIUM SERPL-MCNC: 9.6 MG/DL (ref 8.7–10.3)
CHLORIDE SERPL-SCNC: 104 MMOL/L (ref 96–106)
CHOLEST SERPL-MCNC: 289 MG/DL (ref 100–199)
CO2 SERPL-SCNC: 24 MMOL/L (ref 20–29)
CREAT SERPL-MCNC: 1.05 MG/DL (ref 0.57–1)
EOSINOPHIL # BLD AUTO: 0.1 X10E3/UL (ref 0–0.4)
EOSINOPHIL NFR BLD AUTO: 1 %
ERYTHROCYTE [DISTWIDTH] IN BLOOD BY AUTOMATED COUNT: 13.5 % (ref 11.7–15.4)
EST. GFR  (NO RACE VARIABLE): 58 ML/MIN/1.73
GLOBULIN SER CALC-MCNC: 1.8 G/DL (ref 1.5–4.5)
GLUCOSE SERPL-MCNC: 104 MG/DL (ref 70–99)
HCT VFR BLD AUTO: 42.9 % (ref 34–46.6)
HDLC SERPL-MCNC: 81 MG/DL
HGB BLD-MCNC: 14.1 G/DL (ref 11.1–15.9)
IMM GRANULOCYTES # BLD AUTO: 0 X10E3/UL (ref 0–0.1)
IMM GRANULOCYTES NFR BLD AUTO: 0 %
LABORATORY COMMENT REPORT: ABNORMAL
LDLC SERPL CALC-MCNC: 197 MG/DL (ref 0–99)
LYMPHOCYTES # BLD AUTO: 1.6 X10E3/UL (ref 0.7–3.1)
LYMPHOCYTES NFR BLD AUTO: 34 %
MCH RBC QN AUTO: 30.3 PG (ref 26.6–33)
MCHC RBC AUTO-ENTMCNC: 32.9 G/DL (ref 31.5–35.7)
MCV RBC AUTO: 92 FL (ref 79–97)
MONOCYTES # BLD AUTO: 0.3 X10E3/UL (ref 0.1–0.9)
MONOCYTES NFR BLD AUTO: 6 %
NEUTROPHILS # BLD AUTO: 2.6 X10E3/UL (ref 1.4–7)
NEUTROPHILS NFR BLD AUTO: 58 %
PLATELET # BLD AUTO: 225 X10E3/UL (ref 150–450)
POTASSIUM SERPL-SCNC: 4.7 MMOL/L (ref 3.5–5.2)
PROT SERPL-MCNC: 6.1 G/DL (ref 6–8.5)
RBC # BLD AUTO: 4.66 X10E6/UL (ref 3.77–5.28)
SODIUM SERPL-SCNC: 141 MMOL/L (ref 134–144)
TRIGL SERPL-MCNC: 73 MG/DL (ref 0–149)
TSH SERPL DL<=0.005 MIU/L-ACNC: 2.7 UIU/ML (ref 0.45–4.5)
VLDLC SERPL CALC-MCNC: 11 MG/DL (ref 5–40)
WBC # BLD AUTO: 4.5 X10E3/UL (ref 3.4–10.8)

## 2023-12-20 ENCOUNTER — OFFICE VISIT (OUTPATIENT)
Dept: FAMILY MEDICINE | Facility: CLINIC | Age: 68
End: 2023-12-20
Payer: MEDICARE

## 2023-12-20 VITALS
DIASTOLIC BLOOD PRESSURE: 88 MMHG | WEIGHT: 202.5 LBS | TEMPERATURE: 97 F | SYSTOLIC BLOOD PRESSURE: 146 MMHG | HEART RATE: 85 BPM | OXYGEN SATURATION: 97 % | HEIGHT: 62 IN | BODY MASS INDEX: 37.26 KG/M2

## 2023-12-20 DIAGNOSIS — E78.00 PURE HYPERCHOLESTEROLEMIA: ICD-10-CM

## 2023-12-20 DIAGNOSIS — E03.9 ACQUIRED HYPOTHYROIDISM: Primary | ICD-10-CM

## 2023-12-20 DIAGNOSIS — U07.1 COVID: ICD-10-CM

## 2023-12-20 DIAGNOSIS — R73.01 IMPAIRED FASTING GLUCOSE: ICD-10-CM

## 2023-12-20 DIAGNOSIS — K21.9 GASTROESOPHAGEAL REFLUX DISEASE, UNSPECIFIED WHETHER ESOPHAGITIS PRESENT: ICD-10-CM

## 2023-12-20 DIAGNOSIS — J06.9 UPPER RESPIRATORY TRACT INFECTION, UNSPECIFIED TYPE: ICD-10-CM

## 2023-12-20 DIAGNOSIS — U07.1 COVID-19 VIRUS DETECTED: ICD-10-CM

## 2023-12-20 LAB
CTP QC/QA: YES
SARS-COV-2 RDRP RESP QL NAA+PROBE: POSITIVE

## 2023-12-20 PROCEDURE — 99999 PR PBB SHADOW E&M-EST. PATIENT-LVL III: CPT | Mod: PBBFAC,,, | Performed by: INTERNAL MEDICINE

## 2023-12-20 PROCEDURE — 99214 OFFICE O/P EST MOD 30 MIN: CPT | Mod: S$PBB,,, | Performed by: INTERNAL MEDICINE

## 2023-12-20 PROCEDURE — 99214 PR OFFICE/OUTPT VISIT, EST, LEVL IV, 30-39 MIN: ICD-10-PCS | Mod: S$PBB,,, | Performed by: INTERNAL MEDICINE

## 2023-12-20 PROCEDURE — 99999 PR PBB SHADOW E&M-EST. PATIENT-LVL III: ICD-10-PCS | Mod: PBBFAC,,, | Performed by: INTERNAL MEDICINE

## 2023-12-20 PROCEDURE — 99213 OFFICE O/P EST LOW 20 MIN: CPT | Mod: PBBFAC,PN | Performed by: INTERNAL MEDICINE

## 2023-12-20 PROCEDURE — 99999PBSHW: Mod: PBBFAC,,,

## 2023-12-20 PROCEDURE — 87635 SARS-COV-2 COVID-19 AMP PRB: CPT | Mod: PBBFAC,PN | Performed by: INTERNAL MEDICINE

## 2023-12-20 PROCEDURE — 99999PBSHW: ICD-10-PCS | Mod: PBBFAC,,,

## 2023-12-20 RX ORDER — PITAVASTATIN CALCIUM 2.09 MG/1
2 TABLET, FILM COATED ORAL NIGHTLY
Qty: 30 TABLET | Refills: 3 | Status: SHIPPED | OUTPATIENT
Start: 2023-12-20 | End: 2024-12-19

## 2023-12-20 RX ORDER — LEVOTHYROXINE SODIUM 50 UG/1
50 TABLET ORAL DAILY
Qty: 90 TABLET | Refills: 1 | Status: SHIPPED | OUTPATIENT
Start: 2023-12-20

## 2023-12-20 RX ORDER — PANTOPRAZOLE SODIUM 40 MG/1
40 TABLET, DELAYED RELEASE ORAL DAILY
Qty: 90 TABLET | Refills: 1 | Status: SHIPPED | OUTPATIENT
Start: 2023-12-20

## 2023-12-20 NOTE — PROGRESS NOTES
Subjective:       Patient ID: Michelle Farzier is a 68 y.o. female.    Chief Complaint: Follow-up, Sore Throat (4 days), Cough, Headache, Fatigue, Fever, Nasal Congestion, and Sinus Problem    Here for routine follow up; last visit note, most recent available labs, and health maintenance topics reviewed.     Sore Throat   This is a new problem. The current episode started in the past 7 days. The problem has been waxing and waning. The pain is worse on the left side. There has been no fever (Tmax 99.8). The fever has been present for 1 to 2 days. The pain is at a severity of 7/10. The pain is moderate. Associated symptoms include congestion, coughing, diarrhea, ear pain, headaches, a hoarse voice, a plugged ear sensation, shortness of breath, stridor and trouble swallowing. Pertinent negatives include no abdominal pain, drooling, ear discharge, neck pain, swollen glands or vomiting. She has had no exposure to strep or mono. She has tried NSAIDs (dayquil/nyquil) for the symptoms. The treatment provided mild relief.   Thyroid Problem  Presents for follow-up visit. Symptoms include diarrhea, fatigue and hoarse voice. Patient reports no anxiety, cold intolerance, constipation, diaphoresis, heat intolerance, menstrual problem, palpitations or tremors. The symptoms have been stable. Her past medical history is significant for hyperlipidemia.   Hyperlipidemia  This is a chronic problem. The problem is uncontrolled. Recent lipid tests were reviewed and are high. Associated symptoms include shortness of breath. Pertinent negatives include no chest pain or myalgias. She is currently on no antihyperlipidemic treatment. Compliance problems include medication side effects (tried statins and zetia and had side effects).      Review of Systems   Constitutional:  Positive for chills, fatigue and fever. Negative for activity change, appetite change, diaphoresis and unexpected weight change.   HENT:  Positive for congestion, ear pain,  hoarse voice, postnasal drip, rhinorrhea, sinus pressure, sinus pain, sneezing, sore throat, trouble swallowing and voice change. Negative for drooling, ear discharge, hearing loss, mouth sores, nosebleeds and tinnitus.    Eyes:  Negative for photophobia, pain, discharge, redness, itching and visual disturbance.   Respiratory:  Positive for cough, chest tightness, shortness of breath, wheezing and stridor. Negative for apnea and choking.    Cardiovascular:  Negative for chest pain, palpitations and leg swelling.   Gastrointestinal:  Positive for diarrhea. Negative for abdominal distention, abdominal pain, blood in stool, constipation, nausea and vomiting.   Endocrine: Negative for cold intolerance, heat intolerance, polydipsia and polyuria.   Genitourinary:  Negative for decreased urine volume, difficulty urinating, dyspareunia, dysuria, enuresis, frequency, genital sores, hematuria, menstrual problem, pelvic pain, urgency, vaginal bleeding, vaginal discharge and vaginal pain.   Musculoskeletal:  Negative for arthralgias, back pain, gait problem, joint swelling, myalgias, neck pain and neck stiffness.   Skin:  Negative for rash and wound.   Allergic/Immunologic: Negative for environmental allergies, food allergies and immunocompromised state.   Neurological:  Positive for headaches. Negative for dizziness, tremors, seizures, syncope, facial asymmetry, speech difficulty, weakness, light-headedness and numbness.   Hematological:  Negative for adenopathy. Does not bruise/bleed easily.   Psychiatric/Behavioral:  Negative for confusion, decreased concentration, hallucinations, self-injury, sleep disturbance and suicidal ideas. The patient is not nervous/anxious.        Past Medical History:   Diagnosis Date    Arthralgia of multiple joints 10/1/2020    Diverticulosis of intestine 10/1/2020    Dyslipidemia 10/1/2020    GERD (gastroesophageal reflux disease)     Hypothyroidism       Past Surgical History:   Procedure  Laterality Date    APPENDECTOMY      CARPAL TUNNEL RELEASE Right     CHOLECYSTECTOMY      COLONOSCOPY      EYE SURGERY      cataracts    LAPAROSCOPIC CHOLECYSTECTOMY N/A 2022    Procedure: CHOLECYSTECTOMY, LAPAROSCOPIC;  Surgeon: Cristian Park Jr., MD;  Location: [facility];  Service: General;  Laterality: N/A;       Family History   Problem Relation Age of Onset    Arthritis Mother     Hearing loss Mother     Vision loss Mother     Arthritis Father     Colon cancer Paternal Grandmother         >80    Breast cancer Paternal Grandmother 65       Social History     Socioeconomic History    Marital status:    Tobacco Use    Smoking status: Former     Current packs/day: 0.00     Types: Cigarettes     Quit date: 2012     Years since quittin.5    Smokeless tobacco: Never   Substance and Sexual Activity    Alcohol use: No    Drug use: No    Sexual activity: Yes     Partners: Male     Birth control/protection: Post-menopausal     Comment:    Social History Narrative    Live with      Social Determinants of Health     Financial Resource Strain: Low Risk  (2023)    Overall Financial Resource Strain (CARDIA)     Difficulty of Paying Living Expenses: Not hard at all   Food Insecurity: No Food Insecurity (2023)    Hunger Vital Sign     Worried About Running Out of Food in the Last Year: Never true     Ran Out of Food in the Last Year: Never true   Transportation Needs: No Transportation Needs (2023)    PRAPARE - Transportation     Lack of Transportation (Medical): No     Lack of Transportation (Non-Medical): No   Physical Activity: Insufficiently Active (2023)    Exercise Vital Sign     Days of Exercise per Week: 2 days     Minutes of Exercise per Session: 40 min   Stress: No Stress Concern Present (2023)    [employer] [place] [employer] - Occupational Stress Questionnaire     Feeling of Stress : Only a little   Social Connections: Unknown  (12/20/2023)    Social Connection and Isolation Panel [NHANES]     Frequency of Communication with Friends and Family: Three times a week     Frequency of Social Gatherings with Friends and Family: Never     Active Member of Clubs or Organizations: Yes     Attends Club or Organization Meetings: More than 4 times per year     Marital Status:    Housing Stability: Low Risk  (12/20/2023)    Housing Stability Vital Sign     Unable to Pay for Housing in the Last Year: No     Number of Places Lived in the Last Year: 1     Unstable Housing in the Last Year: No       Current Outpatient Medications   Medication Sig Dispense Refill    albuterol (PROVENTIL/VENTOLIN HFA) 90 mcg/actuation inhaler Inhale 2 puffs into the lungs every 4 (four) hours as needed for Wheezing or Shortness of Breath. 18 g 11    budesonide (PULMICORT) 0.5 mg/2 mL nebulizer solution Take 2 mLs (0.5 mg total) by nebulization 2 (two) times daily. Controller 120 mL 0    fluticasone-umeclidin-vilanter (TRELEGY ELLIPTA) 200-62.5-25 mcg inhaler Inhale 1 puff into the lungs once daily. 180 each 3    predniSONE (DELTASONE) 20 MG tablet Take one pill a day for three days, repeat for shortness of breath (Patient taking differently: Take one pill a day for three days, repeat for shortness of breath  prn) 12 tablet 0    levothyroxine (SYNTHROID) 50 MCG tablet Take 1 tablet (50 mcg total) by mouth once daily. 90 tablet 1    nirmatrelvir-ritonavir 150-100 mg DsPk Take 2 tablets by mouth 2 (two) times daily for 5 days. Each dose contains 1 nirmatrelvir (pink tablet) and 1 ritonavir (white tablet). Take both tablets together 20 tablet 0    pantoprazole (PROTONIX) 40 MG tablet Take 1 tablet (40 mg total) by mouth once daily. 90 tablet 1    pitavastatin calcium (LIVALO) 2 mg Tab tablet Take 1 tablet (2 mg total) by mouth every evening. 30 tablet 3     No current facility-administered medications for this visit.       Review of patient's allergies indicates:  No  "Known Allergies  Objective:    HPI     Sore Throat     Additional comments: 4 days          Last edited by Jia Lara MA on 12/20/2023  1:21 PM.      Blood pressure (!) 146/88, pulse 85, temperature 96.6 °F (35.9 °C), temperature source Temporal, height 5' 2" (1.575 m), weight 91.8 kg (202 lb 7.9 oz), SpO2 97 %. Body mass index is 37.04 kg/m².   Physical Exam  Vitals and nursing note reviewed.   Constitutional:       General: She is not in acute distress.     Appearance: She is well-developed. She is obese. She is not ill-appearing, toxic-appearing or diaphoretic.   HENT:      Head: Normocephalic and atraumatic.      Right Ear: Tympanic membrane, ear canal and external ear normal.      Left Ear: Tympanic membrane, ear canal and external ear normal.      Nose: Nose normal. No rhinorrhea.      Right Sinus: No maxillary sinus tenderness or frontal sinus tenderness.      Left Sinus: No maxillary sinus tenderness or frontal sinus tenderness.      Mouth/Throat:      Pharynx: No oropharyngeal exudate or posterior oropharyngeal erythema.      Tonsils: No tonsillar exudate.   Eyes:      General: No scleral icterus.        Right eye: No discharge.         Left eye: No discharge.      Conjunctiva/sclera: Conjunctivae normal.   Neck:      Vascular: No carotid bruit.   Cardiovascular:      Rate and Rhythm: Normal rate and regular rhythm.      Heart sounds: Normal heart sounds. No murmur heard.     No friction rub. No gallop.   Pulmonary:      Effort: Pulmonary effort is normal. No tachypnea, accessory muscle usage or respiratory distress.      Breath sounds: Normal breath sounds. No decreased breath sounds, wheezing, rhonchi or rales.      Comments: Spasmodic cough    Abdominal:      General: There is no distension.      Palpations: Abdomen is soft.      Tenderness: There is no abdominal tenderness. There is no guarding or rebound.   Musculoskeletal:      Right lower leg: No edema.      Left lower leg: No edema.   Skin:    "  General: Skin is warm and dry.   Neurological:      Mental Status: She is alert.      Motor: No tremor.   Psychiatric:         Mood and Affect: Mood normal.         Speech: Speech normal.         Behavior: Behavior normal.           Office Visit on 12/20/2023   Component Date Value Ref Range Status    POC Rapid COVID 12/20/2023 Positive (A)  Negative Final     Acceptable 12/20/2023 Yes   Final   Patient Message on 12/04/2023   Component Date Value Ref Range Status    WBC 12/08/2023 4.5  3.4 - 10.8 x10E3/uL Final    RBC 12/08/2023 4.66  3.77 - 5.28 x10E6/uL Final    Hemoglobin 12/08/2023 14.1  11.1 - 15.9 g/dL Final    Hematocrit 12/08/2023 42.9  34.0 - 46.6 % Final    MCV 12/08/2023 92  79 - 97 fL Final    MCH 12/08/2023 30.3  26.6 - 33.0 pg Final    MCHC 12/08/2023 32.9  31.5 - 35.7 g/dL Final    RDW 12/08/2023 13.5  11.7 - 15.4 % Final    Platelets 12/08/2023 225  150 - 450 x10E3/uL Final    Neutrophils 12/08/2023 58  Not Estab. % Final    Lymphs 12/08/2023 34  Not Estab. % Final    Monocytes 12/08/2023 6  Not Estab. % Final    Eos 12/08/2023 1  Not Estab. % Final    Basos 12/08/2023 1  Not Estab. % Final    Neutrophils (Absolute) 12/08/2023 2.6  1.4 - 7.0 x10E3/uL Final    Lymphs (Absolute) 12/08/2023 1.6  0.7 - 3.1 x10E3/uL Final    Monocytes(Absolute) 12/08/2023 0.3  0.1 - 0.9 x10E3/uL Final    Eos (Absolute) 12/08/2023 0.1  0.0 - 0.4 x10E3/uL Final    Baso (Absolute) 12/08/2023 0.1  0.0 - 0.2 x10E3/uL Final    Immature Granulocytes 12/08/2023 0  Not Estab. % Final    Immature Grans (Abs) 12/08/2023 0.0  0.0 - 0.1 x10E3/uL Final    Glucose 12/08/2023 104 (H)  70 - 99 mg/dL Final    BUN 12/08/2023 12  8 - 27 mg/dL Final    Creatinine 12/08/2023 1.05 (H)  0.57 - 1.00 mg/dL Final    eGFR 12/08/2023 58 (L)  >59 mL/min/1.73 Final    BUN/Creatinine Ratio 12/08/2023 11 (L)  12 - 28 Final    Sodium 12/08/2023 141  134 - 144 mmol/L Final    Potassium 12/08/2023 4.7  3.5 - 5.2 mmol/L Final    Chloride  12/08/2023 104  96 - 106 mmol/L Final    CO2 12/08/2023 24  20 - 29 mmol/L Final    Calcium 12/08/2023 9.6  8.7 - 10.3 mg/dL Final    Protein, Total 12/08/2023 6.1  6.0 - 8.5 g/dL Final    Albumin 12/08/2023 4.3  3.9 - 4.9 g/dL Final    Globulin, Total 12/08/2023 1.8  1.5 - 4.5 g/dL Final    Albumin/Globulin Ratio 12/08/2023 2.4 (H)  1.2 - 2.2 Final    Total Bilirubin 12/08/2023 0.3  0.0 - 1.2 mg/dL Final    Alkaline Phosphatase 12/08/2023 128 (H)  44 - 121 IU/L Final    AST 12/08/2023 20  0 - 40 IU/L Final    ALT 12/08/2023 20  0 - 32 IU/L Final    Cholesterol 12/08/2023 289 (H)  100 - 199 mg/dL Final    Triglycerides 12/08/2023 73  0 - 149 mg/dL Final    HDL 12/08/2023 81  >39 mg/dL Final    VLDL Cholesterol Juan Manuel 12/08/2023 11  5 - 40 mg/dL Final    LDL Calculated 12/08/2023 197 (H)  0 - 99 mg/dL Final    Comments: 12/08/2023 Comment   Final    Comment: Possible Familial Hypercholesterolemia. FH should be suspected when  fasting LDL cholesterol is above 189 mg/dL or non-HDL cholesterol  is above 219 mg/dL. A family history of high cholesterol and heart  disease in 1st degree relatives should be collected. J Clin Lipidol  2011;5:133-140      TSH 12/08/2023 2.700  0.450 - 4.500 uIU/mL Final   ]      Assessment:       1. Acquired hypothyroidism    2. Gastroesophageal reflux disease, unspecified whether esophagitis present    3. Upper respiratory tract infection, unspecified type    4. Pure hypercholesterolemia    5. COVID    6. Impaired fasting glucose        Plan:       Michelle was seen today for follow-up, sore throat, cough, headache, fatigue, fever, nasal congestion and sinus problem.    Diagnoses and all orders for this visit:    Acquired hypothyroidism  -     levothyroxine (SYNTHROID) 50 MCG tablet; Take 1 tablet (50 mcg total) by mouth once daily.    Gastroesophageal reflux disease, unspecified whether esophagitis present  Comments:  Failed H2 blocker  Orders:  -     pantoprazole (PROTONIX) 40 MG tablet; Take 1  tablet (40 mg total) by mouth once daily.    Upper respiratory tract infection, unspecified type  -     POCT COVID-19 Rapid Screening    Pure hypercholesterolemia  Comments:  She has tried zetia, crestor, and lipitor.  She is willing to try something else so will see if pitavastatin is affordable  Orders:  -     pitavastatin calcium (LIVALO) 2 mg Tab tablet; Take 1 tablet (2 mg total) by mouth every evening.    COVID  Comments:  Discussed isolation recommendations, OTC supportive care  Orders:  -     Discontinue: nirmatrelvir-ritonavir 150-100 mg DsPk; Take 2 tablets by mouth 2 (two) times daily for 5 days. Each dose contains 1 nirmatrelvir (pink tablet) and 1 ritonavir (white tablet). Take both tablets together  -     nirmatrelvir-ritonavir 150-100 mg DsPk; Take 2 tablets by mouth 2 (two) times daily for 5 days. Each dose contains 1 nirmatrelvir (pink tablet) and 1 ritonavir (white tablet). Take both tablets together    Impaired fasting glucose  Comments:  Discussed reducing carbs in diet, increasing activity

## 2023-12-20 NOTE — PATIENT INSTRUCTIONS
Your recent labs revealed an elevation in your fasting blood sugar.  This indicates that you may be at risk for developing diabetes.  You can reduce this risk by reducing or eliminating sweets from your diet, using wheat bread/pasta, limiting potato intake, and walking at least 30 minutes per day.  We will do follow bloodwork at your next appointment.    Your test was POSITIVE for COVID-19 (coronavirus).       Please isolate yourself at home.  You may leave home and/or return to work once the following conditions are met:    If you were not hospitalized and are not moderately to severely immunocompromised:   More than 5 days since symptoms first appeared AND  More than 24 hours fever free without medications AND  Symptoms are improving  Continue to wear a mask around others for 5 additional days.    If you were hospitalized OR are moderately to severely immunocompromised:  More than 20 days since symptoms first appeared  More than 24 hours fever free without medications  Symptoms have improved    If you had no symptoms but tested positive:  More than 5 days since the date of the first positive test (20 days if moderately to severely immunocompromised). If you develop symptoms, then use the guidelines above.  Continue to wear a mask around others for 5 additional days.      Contact Tracing    As one of the next steps, you will receive a call or text from the [place] Department of Health ([facility]) COVID-19 Tracing Team. See the contact information below so you know not to ignore the health departments call. It is important that you contact them back immediately so they can help.      Contact Tracer Number:  [phone]  Caller ID for most carriers: [place] Dept Health     What is contact tracing?  Contact tracing is a process that helps identify everyone who has been in close contact with an infected person. Contact tracers let those people know they may have been exposed and guide them on next steps. Confidentiality is  important for everyone; no one will be told who may have exposed them to the virus.  Your involvement is important. The more we know about where and how this virus is spreading, the better chance we have at stopping it from spreading further.  What does exposure mean?  Exposure means you have been within 6 feet for more than 15 minutes with a person who has or had COVID-19.  What kind of questions do the contact tracers ask?  A contact tracer will confirm your basic contact information including name, address, phone number, and next of kin, as well as asking about any symptoms you may have had. Theyll also ask you how you think you may have gotten sick, such as places where you may have been exposed to the virus, and people you were with. Those names will never be shared with anyone outside of that call, and will only be used to help trace and stop the spread of the virus.   I have privacy concerns. How will the state use my information?  Your privacy about your health is important. All calls are completed using call centers that use the appropriate health privacy protection measures (HIPAA compliance), meaning that your patient information is safe. No one will ever ask you any questions related to immigration status. Your health comes first.   Do I have to participate?  You do not have to participate, but we strongly encourage you to. Contact tracing can help us catch and control new outbreaks as theyre developing to keep your friends and family safe.   What if I dont hear from anyone?  If you dont receive a call within 24 hours, you can call the number above right away to inquire about your status. That line is open from 8:00 am - 8:00 p.m., 7 days a week.  Contact tracing saves lives! Together, we have the power to beat this virus and keep our loved ones and neighbors safe.    For more information see CDC link below.      https://www.cdc.gov/coronavirus/2019-ncov/hcp/guidance-prevent-spread.html#precautions         Sources:  [facility], [facility] and [facility]           Sincerely,     Matt Beltre Jr, MD

## 2023-12-21 ENCOUNTER — PATIENT MESSAGE (OUTPATIENT)
Dept: PULMONOLOGY | Facility: CLINIC | Age: 68
End: 2023-12-21
Payer: MEDICARE

## 2023-12-22 DIAGNOSIS — J45.50 SEVERE PERSISTENT ASTHMA WITHOUT COMPLICATION: Primary | ICD-10-CM

## 2023-12-22 RX ORDER — ALBUTEROL SULFATE 0.83 MG/ML
2.5 SOLUTION RESPIRATORY (INHALATION) EVERY 6 HOURS PRN
COMMUNITY
End: 2023-12-22 | Stop reason: SDUPTHER

## 2023-12-22 RX ORDER — ALBUTEROL SULFATE 0.83 MG/ML
2.5 SOLUTION RESPIRATORY (INHALATION) EVERY 6 HOURS PRN
Qty: 180 ML | Refills: 3 | Status: SHIPPED | OUTPATIENT
Start: 2023-12-22 | End: 2024-02-21 | Stop reason: SDUPTHER

## 2024-01-11 DIAGNOSIS — Z00.00 ENCOUNTER FOR MEDICARE ANNUAL WELLNESS EXAM: ICD-10-CM

## 2024-02-21 DIAGNOSIS — J45.50 SEVERE PERSISTENT ASTHMA WITHOUT COMPLICATION: ICD-10-CM

## 2024-02-22 RX ORDER — ALBUTEROL SULFATE 0.83 MG/ML
2.5 SOLUTION RESPIRATORY (INHALATION) EVERY 6 HOURS PRN
Qty: 180 ML | Refills: 3 | Status: SHIPPED | OUTPATIENT
Start: 2024-02-22

## 2024-03-13 ENCOUNTER — OFFICE VISIT (OUTPATIENT)
Dept: PULMONOLOGY | Facility: CLINIC | Age: 69
End: 2024-03-13
Payer: MEDICARE

## 2024-03-13 VITALS
HEIGHT: 62 IN | OXYGEN SATURATION: 97 % | BODY MASS INDEX: 37.4 KG/M2 | SYSTOLIC BLOOD PRESSURE: 132 MMHG | HEART RATE: 70 BPM | WEIGHT: 203.25 LBS | DIASTOLIC BLOOD PRESSURE: 78 MMHG

## 2024-03-13 DIAGNOSIS — J45.50 SEVERE PERSISTENT ASTHMA WITHOUT COMPLICATION: Primary | ICD-10-CM

## 2024-03-13 DIAGNOSIS — R73.03 PREDIABETES: ICD-10-CM

## 2024-03-13 DIAGNOSIS — R05.1 ACUTE COUGH: ICD-10-CM

## 2024-03-13 DIAGNOSIS — Z78.0 MENOPAUSE: ICD-10-CM

## 2024-03-13 PROCEDURE — 99213 OFFICE O/P EST LOW 20 MIN: CPT | Mod: S$PBB,,, | Performed by: NURSE PRACTITIONER

## 2024-03-13 PROCEDURE — 99213 OFFICE O/P EST LOW 20 MIN: CPT | Mod: PBBFAC,PO | Performed by: NURSE PRACTITIONER

## 2024-03-13 PROCEDURE — 99999 PR PBB SHADOW E&M-EST. PATIENT-LVL III: CPT | Mod: PBBFAC,,, | Performed by: NURSE PRACTITIONER

## 2024-03-13 RX ORDER — PREDNISONE 20 MG/1
TABLET ORAL
Qty: 12 TABLET | Refills: 0 | Status: SHIPPED | OUTPATIENT
Start: 2024-03-13

## 2024-03-13 RX ORDER — CODEINE PHOSPHATE AND GUAIFENESIN 10; 100 MG/5ML; MG/5ML
5 SOLUTION ORAL EVERY 4 HOURS PRN
Qty: 210 ML | Refills: 0 | Status: SHIPPED | OUTPATIENT
Start: 2024-03-13 | End: 2024-03-20

## 2024-03-13 NOTE — PATIENT INSTRUCTIONS
Will continue current Asthma medication regiment    Use codeine cough syrup sparingly, this will decrease cough and allow you to rest at night, do not take with other pain or sleeping medications.

## 2024-03-13 NOTE — PROGRESS NOTES
3/13/2024    Michelle Frazier      Chief Complaint   Patient presents with    6m F/U       HPI:   3/13/2024- states doing well, diagnosed COVID 19 in December, milder case, complaint of sinus congestion and headache. Treated with albuterol nebulizer with benefit.   Had residual cough for months. States finally feeling back to normal.     States her cough is stable, she is triggered by strong perfumes and vacuum  at work, using albuterol rescue on average 2x weekly, nocturnal cough most nights when bed is flat. No issue when bed is raised. On GERD medication.   On Trelegy daily,     8/7/2023-noticed improvement in breathing after transferring to [place] office from other office. On Trelegy daily with benefit. Needing albuterol rescue 1-2 x weekly only when triggered with noxious smells such as lysol or perfume.   Has cough syrup needing 1-2 times monthly for coughing fits.     No asthma exacerbations since last visit.       1/6/23- states asthma was well controlled for months, took a break from daily inahalers for 4 months, did have complaint of chest tightness and wheeze that returned. Restarted on daily Trelegy with benefit.   Had asthma exacerbation 3 weeks earlier while at work, improved with albuterol rescue inhaler. States she still does not feel back to normal. Has persistent chest tightness associated with hoarse voice, improves with albuterol inhaler. Using nebulizer nightly for past 3 weeks.   Having nocturnal coughing fits nightly, difficult to sleep.       7/6/2022- currently recovering from Gallbladder removal. States breathing is improved following surgery due to being away from office. On Trelegy daily with benefit.   No current complaint of cough, wheeze,  SOB- stable, only with exertion, not exerting herself due to recent surgery    3/24/2022- States asthma has improved while on Trelegy 200 daily, noticed her triggers are sudden changes in weather and strong smells.   Complaint of chest tightness-  recurrent complaint, 3 times weekly, improves with albuterol rescue, worse when feeling stressed. Associated with wheeze.   Cough- non productive, worse when laying down, nocturnal arousals most nights of week.   Had pneumonia vaccine.     Had to take prednisone for asthma attack last January, felt much better after 3 days of prednisone.   Has asthma attack while showering due to exposure to , considered going to ER so severe, took multiple inhalation of albuterol inhaler to improve.        8/4/2021- States SOB is improved, improves with albuterol rescue inhaler 3x weekly, worse when noxious smells are inhaled such as fresh paint. Used inhaler this morning with chest tightness. On Trelegy with great benefit. Cough- improved, worse when sinus drain. No current complaint of wheeze.     5/4/2021- SOB- currently a daily complaint, worse with exertion, lives with stairs and has to rest after walking up stairs.   Cough- worse at night, worsened in last 2 months, stopped taking Trelegy daily until January 2021. Improves with albuterol rescue having to use 2x daily. States worse when she smells strong odors or gets worse.   Associated with chest tightness and wheeze.     12/28/2020- rash is improved, no longer painful with scabbed blisters.   SOB- recurrent problem, improved during the day, Worse with exertion, has severe SOB when laying down at night. causes severe anxiety gasping for air 2x times in last 4 weeks.  Improved with albuterol rescue inhaler. Using oxygen at night,     11/24/2020- new onset rash, onset 5 days. Started with back ache that improved with over the counter aleve and a few red spots on lower back that spread across entire back and around to abdomen on left side. Describes as burning, tingling pins sticking in skin 7 on 0-10 pain scale.     SOB- worse when wearing mask and exertion walking up stairs, wearing supplemental oxygen at 2 L during day as needed.  Associated with chest tightness worse  when bending over and fatigue.   No cough,     Dr. Fagan consult:  consult by Dr Fagan:   History of Present Illness:  Pt healthy, lives in [place] with .  Has good function, developed headaches and n/v/d last 10 days , takes in very little.  Voiding minimally.  Pt developed sob ppt er visit.  covid + on  .  Breathing well now on nc ox.  Headache severe, not eating. Plan:   temperature max 101.6, ferritin 458, creatinine 0.7, chest x-ray ground-glass opacity in the lateral left lung-right lung looks clear.   Will give couple liters d5 .   norco for headahce prn   Discussed remdesivir and plasma- kodi late in course but may have some benefit- pt wishes to receive.  sterroids ordered. Discussed bipap/vent if needed.         The chief compliant  problem varies with instablilty at time    PFSH:  Past Medical History:   Diagnosis Date    Arthralgia of multiple joints 10/1/2020    Diverticulosis of intestine 10/1/2020    Dyslipidemia 10/1/2020    GERD (gastroesophageal reflux disease)     Hypothyroidism          Past Surgical History:   Procedure Laterality Date    APPENDECTOMY      CARPAL TUNNEL RELEASE Right     CHOLECYSTECTOMY      COLONOSCOPY      EYE SURGERY      cataracts    LAPAROSCOPIC CHOLECYSTECTOMY N/A 2022    Procedure: CHOLECYSTECTOMY, LAPAROSCOPIC;  Surgeon: Cristian Park Jr., MD;  Location: [facility];  Service: General;  Laterality: N/A;     Social History     Tobacco Use    Smoking status: Former     Current packs/day: 0.00     Types: Cigarettes     Quit date: 2012     Years since quittin.7    Smokeless tobacco: Never   Substance Use Topics    Alcohol use: No    Drug use: No     Family History   Problem Relation Age of Onset    Arthritis Mother     Hearing loss Mother     Vision loss Mother     Arthritis Father     Colon cancer Paternal Grandmother         >80    Breast cancer Paternal Grandmother 65     Review of patient's allergies indicates:  No Known  "Allergies  I have reviewed past medical, family, and social history. I have reviewed previous nurse notes.    Performance Status:The patient's activity level is housebound activities.      Review of Systems:  a review of eleven systems covering constitutional, Eye, HEENT, Psych, Respiratory, Cardiac, GI, , Musculoskeletal, Endocrine, Dermatologic was negative except for pertinent findings as listed ABOVE and below: pertinent positive as above, rest is good  Shortness of breath, chest tightness, wheeze.          Exam:Comprehensive exam done. /78 (BP Location: Left arm, Patient Position: Sitting, BP Method: Medium (Automatic))   Pulse 70   Ht 5' 2" (1.575 m)   Wt 92.2 kg (203 lb 4.2 oz)   SpO2 97%   BMI 37.18 kg/m²   Exam included Vitals as listed, and patient's appearance and affect and alertness and mood, oral exam for yeast and hygiene and pharynx lesions and Mallapatti (M) score, neck with inspection for jvd and masses and thyroid abnormalities and lymph nodes (supraclavicular and infraclavicular nodes and axillary also examined and noted if abn), chest exam included symmetry and effort and fremitus and percussion and auscultation, cardiac exam included rhythm and gallops and murmur and rubs and jvd and edema, abdominal exam for mass and hepatosplenomegaly and tenderness and hernias and bowel sounds, Musculoskeletal exam with muscle tone and posture and mobility/gait and  strength, and skin for rashes and cyanosis and pallor and turgor, extremity for clubbing.  Findings were normal except for pertinent findings listed below: M2, BS clear        Radiographs (ct chest and cxr) reviewed: view by direct vision   X-Ray Chest 1 View  11/13/2020   There is bibasilar atelectasis versus infiltrate new from prior exam.     X-Ray Chest AP Portable 11/11/2020   A peripheral interstitial infiltrate is identified in the left lung but not in the right lung.  This is an unusual pattern but viral pneumonitis is " still suspected       Labs reviewed    Lab Results   Component Value Date    WBC 4.5 12/08/2023    RBC 4.66 12/08/2023    HGB 14.1 12/08/2023    HCT 42.9 12/08/2023    MCV 92 12/08/2023    MCH 30.3 12/08/2023    MCHC 32.9 12/08/2023    RDW 13.5 12/08/2023     12/08/2023    MPV 10.2 06/24/2022    GRAN 5.8 06/24/2022    GRAN 87.2 (H) 06/24/2022    LYMPH 1.6 12/08/2023    MONO 6 12/08/2023    MONO 0.3 12/08/2023    EOS 0.1 12/08/2023    BASO 0.1 12/08/2023    EOSINOPHIL 1 12/08/2023    BASOPHIL 1 12/08/2023         PFT reviewed  Pulmonary Functions Testing Results:  Spirometry with bronchodilator, lung volume by gas dilution, and diffusion capacity measured December 29, 2020. The FEV1 to FVC ratio was 80% indicating no airflow obstruction was measured by spirometry. The FEV1 was 117% of predicted at 2.5 L. There was    no improvement following bronchodilator. Total lung capacity on lung volume by gas dilution was 95% of predicted and normal. Diffusion also was normal at 81% predicted.Study is normal. Clinical correlation recommended. There was no bronchodilator   response measured.     Plan:  Clinical impression is apparently straight forward and impression with management as below.    Michelle was seen today for 6m f/u.    Diagnoses and all orders for this visit:    Severe persistent asthma without complication  -     guaiFENesin-codeine 100-10 mg/5 ml (TUSSI-ORGANIDIN NR)  mg/5 mL syrup; Take 5 mLs by mouth every 4 (four) hours as needed for Cough.  -     predniSONE (DELTASONE) 20 MG tablet; Take one pill a day for three days, repeat for shortness of breath    Acute cough  -     guaiFENesin-codeine 100-10 mg/5 ml (TUSSI-ORGANIDIN NR)  mg/5 mL syrup; Take 5 mLs by mouth every 4 (four) hours as needed for Cough.          Follow up in about 6 months (around 9/13/2024), or if symptoms worsen or fail to improve.    Discussed with patient above for education the following:      Patient Instructions   Will  continue current Asthma medication regiment    Use codeine cough syrup sparingly, this will decrease cough and allow you to rest at night, do not take with other pain or sleeping medications.

## 2024-05-10 DIAGNOSIS — E78.00 PURE HYPERCHOLESTEROLEMIA: ICD-10-CM

## 2024-05-10 RX ORDER — PITAVASTATIN CALCIUM 2.09 MG/1
2 TABLET, FILM COATED ORAL NIGHTLY
Qty: 30 TABLET | Refills: 3 | Status: SHIPPED | OUTPATIENT
Start: 2024-05-10 | End: 2024-05-22 | Stop reason: SDUPTHER

## 2024-05-16 ENCOUNTER — TELEPHONE (OUTPATIENT)
Dept: FAMILY MEDICINE | Facility: CLINIC | Age: 69
End: 2024-05-16
Payer: MEDICARE

## 2024-05-16 DIAGNOSIS — R73.01 IMPAIRED FASTING GLUCOSE: ICD-10-CM

## 2024-05-16 DIAGNOSIS — E78.00 PURE HYPERCHOLESTEROLEMIA: ICD-10-CM

## 2024-05-16 DIAGNOSIS — E03.9 ACQUIRED HYPOTHYROIDISM: Primary | ICD-10-CM

## 2024-05-16 NOTE — TELEPHONE ENCOUNTER
----- Message from Sherrie Hart sent at 5/16/2024 12:40 PM CDT -----  Does pt need her blood work done before her next appt. Please advise. Pt #[phone]

## 2024-05-22 ENCOUNTER — OFFICE VISIT (OUTPATIENT)
Dept: FAMILY MEDICINE | Facility: CLINIC | Age: 69
End: 2024-05-22
Payer: MEDICARE

## 2024-05-22 VITALS
WEIGHT: 206.38 LBS | OXYGEN SATURATION: 98 % | DIASTOLIC BLOOD PRESSURE: 72 MMHG | HEIGHT: 62 IN | TEMPERATURE: 97 F | BODY MASS INDEX: 37.98 KG/M2 | HEART RATE: 73 BPM | SYSTOLIC BLOOD PRESSURE: 120 MMHG

## 2024-05-22 DIAGNOSIS — E03.9 ACQUIRED HYPOTHYROIDISM: Primary | ICD-10-CM

## 2024-05-22 DIAGNOSIS — R73.03 PREDIABETES: ICD-10-CM

## 2024-05-22 DIAGNOSIS — E78.00 PURE HYPERCHOLESTEROLEMIA: ICD-10-CM

## 2024-05-22 DIAGNOSIS — K21.9 GASTROESOPHAGEAL REFLUX DISEASE, UNSPECIFIED WHETHER ESOPHAGITIS PRESENT: ICD-10-CM

## 2024-05-22 DIAGNOSIS — Z78.0 MENOPAUSE: ICD-10-CM

## 2024-05-22 DIAGNOSIS — Z12.31 VISIT FOR SCREENING MAMMOGRAM: ICD-10-CM

## 2024-05-22 LAB
ALBUMIN SERPL-MCNC: 4.2 G/DL (ref 3.9–4.9)
ALBUMIN/GLOB SERPL: 1.8 {RATIO} (ref 1.2–2.2)
ALP SERPL-CCNC: 161 IU/L (ref 44–121)
ALT SERPL-CCNC: 18 IU/L (ref 0–32)
AST SERPL-CCNC: 17 IU/L (ref 0–40)
BILIRUB SERPL-MCNC: 0.4 MG/DL (ref 0–1.2)
BUN SERPL-MCNC: 12 MG/DL (ref 8–27)
BUN/CREAT SERPL: 13 (ref 12–28)
CALCIUM SERPL-MCNC: 9.3 MG/DL (ref 8.7–10.3)
CHLORIDE SERPL-SCNC: 105 MMOL/L (ref 96–106)
CHOLEST SERPL-MCNC: 238 MG/DL (ref 100–199)
CO2 SERPL-SCNC: 25 MMOL/L (ref 20–29)
CREAT SERPL-MCNC: 0.91 MG/DL (ref 0.57–1)
EST. GFR  (NO RACE VARIABLE): 68 ML/MIN/1.73
GLOBULIN SER CALC-MCNC: 2.3 G/DL (ref 1.5–4.5)
GLUCOSE SERPL-MCNC: 103 MG/DL (ref 70–99)
HBA1C MFR BLD: 6 % (ref 4.8–5.6)
HDLC SERPL-MCNC: 74 MG/DL
LDLC SERPL CALC-MCNC: 136 MG/DL (ref 0–99)
POTASSIUM SERPL-SCNC: 4.2 MMOL/L (ref 3.5–5.2)
PROT SERPL-MCNC: 6.5 G/DL (ref 6–8.5)
SODIUM SERPL-SCNC: 143 MMOL/L (ref 134–144)
TRIGL SERPL-MCNC: 159 MG/DL (ref 0–149)
TSH SERPL DL<=0.005 MIU/L-ACNC: 3.28 UIU/ML (ref 0.45–4.5)
VLDLC SERPL CALC-MCNC: 28 MG/DL (ref 5–40)

## 2024-05-22 PROCEDURE — 99999 PR PBB SHADOW E&M-EST. PATIENT-LVL IV: CPT | Mod: PBBFAC,,, | Performed by: INTERNAL MEDICINE

## 2024-05-22 PROCEDURE — 99214 OFFICE O/P EST MOD 30 MIN: CPT | Mod: S$PBB,,, | Performed by: INTERNAL MEDICINE

## 2024-05-22 PROCEDURE — 99214 OFFICE O/P EST MOD 30 MIN: CPT | Mod: PBBFAC,PN | Performed by: INTERNAL MEDICINE

## 2024-05-22 RX ORDER — DIPHENHYDRAMINE HCL 25 MG
50 CAPSULE ORAL NIGHTLY PRN
COMMUNITY

## 2024-05-22 RX ORDER — PITAVASTATIN CALCIUM 2.09 MG/1
2 TABLET, FILM COATED ORAL NIGHTLY
Qty: 90 TABLET | Refills: 1 | Status: SHIPPED | OUTPATIENT
Start: 2024-05-22

## 2024-05-22 RX ORDER — LEVOTHYROXINE SODIUM 50 UG/1
50 TABLET ORAL DAILY
Qty: 100 TABLET | Refills: 1 | Status: SHIPPED | OUTPATIENT
Start: 2024-05-22

## 2024-05-22 RX ORDER — PANTOPRAZOLE SODIUM 40 MG/1
40 TABLET, DELAYED RELEASE ORAL DAILY
Qty: 90 TABLET | Refills: 1 | Status: SHIPPED | OUTPATIENT
Start: 2024-05-22

## 2024-05-22 NOTE — PROGRESS NOTES
Subjective:       Patient ID: Michelle Frazier is a 68 y.o. female.    Chief Complaint: Thyroid Problem (5 months follow up)    Here for routine follow up; last visit note, most recent available labs, and health maintenance topics reviewed.     Thyroid Problem  Presents for follow-up visit. Symptoms include hoarse voice, leg swelling (ankles) and palpitations (3 night in a row a few weeks ago; hasn't recurred). Patient reports no anxiety, cold intolerance, constipation, diaphoresis, diarrhea, fatigue, heat intolerance, menstrual problem or tremors. The symptoms have been worsening.   Hyperlipidemia  This is a chronic problem. The problem is controlled. Recent lipid tests were reviewed and are variable. Pertinent negatives include no chest pain, myalgias or shortness of breath. Current antihyperlipidemic treatment includes statins. Compliance problems include medication side effects (tried statins and zetia and had side effects).      Review of Systems   Constitutional:  Negative for activity change, appetite change, chills, diaphoresis, fatigue, fever and unexpected weight change.   HENT:  Positive for hoarse voice. Negative for congestion, drooling, ear discharge, ear pain, hearing loss, mouth sores, nosebleeds, postnasal drip, rhinorrhea, sinus pressure, sinus pain, sneezing, sore throat, tinnitus, trouble swallowing and voice change.    Eyes:  Negative for photophobia, pain, discharge, redness, itching and visual disturbance.   Respiratory:  Positive for stridor. Negative for apnea, cough, choking, chest tightness, shortness of breath and wheezing.    Cardiovascular:  Positive for palpitations (3 night in a row a few weeks ago; hasn't recurred) and leg swelling (ankles). Negative for chest pain.   Gastrointestinal:  Negative for abdominal distention, abdominal pain, blood in stool, constipation, diarrhea, nausea and vomiting.   Endocrine: Negative for cold intolerance, heat intolerance, polydipsia and polyuria.    Genitourinary:  Negative for decreased urine volume, difficulty urinating, dyspareunia, dysuria, enuresis, frequency, genital sores, hematuria, menstrual problem, pelvic pain, urgency, vaginal bleeding, vaginal discharge and vaginal pain.   Musculoskeletal:  Positive for arthralgias and joint swelling. Negative for back pain, gait problem, myalgias, neck pain and neck stiffness.   Skin:  Negative for rash and wound.   Allergic/Immunologic: Negative for environmental allergies, food allergies and immunocompromised state.   Neurological:  Positive for headaches. Negative for dizziness, tremors, seizures, syncope, facial asymmetry, speech difficulty, weakness, light-headedness and numbness.   Hematological:  Negative for adenopathy. Does not bruise/bleed easily.   Psychiatric/Behavioral:  Negative for confusion, decreased concentration, dysphoric mood, hallucinations, self-injury, sleep disturbance and suicidal ideas. The patient is not nervous/anxious.        Past Medical History:   Diagnosis Date    Arthralgia of multiple joints 10/1/2020    Diverticulosis of intestine 10/1/2020    Dyslipidemia 10/1/2020    GERD (gastroesophageal reflux disease)     Hypothyroidism       Past Surgical History:   Procedure Laterality Date    APPENDECTOMY      CARPAL TUNNEL RELEASE Right     CHOLECYSTECTOMY      COLONOSCOPY      EYE SURGERY      cataracts    LAPAROSCOPIC CHOLECYSTECTOMY N/A 06/23/2022    Procedure: CHOLECYSTECTOMY, LAPAROSCOPIC;  Surgeon: Cristian Park Jr., MD;  Location: [facility];  Service: General;  Laterality: N/A;       Family History   Problem Relation Name Age of Onset    Arthritis Mother      Hearing loss Mother      Vision loss Mother      Arthritis Father      Colon cancer Paternal Grandmother          >80    Breast cancer Paternal Grandmother  65       Social History     Socioeconomic History    Marital status:    Tobacco Use    Smoking status: Former     Current packs/day: 0.00     Types:  Cigarettes     Quit date: 2012     Years since quittin.9    Smokeless tobacco: Never   Substance and Sexual Activity    Alcohol use: No    Drug use: No    Sexual activity: Yes     Partners: Male     Birth control/protection: Post-menopausal     Comment:    Social History Narrative    Live with      Social Determinants of Health     Financial Resource Strain: Low Risk  (2023)    Overall Financial Resource Strain (CARDIA)     Difficulty of Paying Living Expenses: Not hard at all   Food Insecurity: No Food Insecurity (2023)    Hunger Vital Sign     Worried About Running Out of Food in the Last Year: Never true     Ran Out of Food in the Last Year: Never true   Transportation Needs: No Transportation Needs (2023)    PRAPARE - Transportation     Lack of Transportation (Medical): No     Lack of Transportation (Non-Medical): No   Physical Activity: Insufficiently Active (2023)    Exercise Vital Sign     Days of Exercise per Week: 2 days     Minutes of Exercise per Session: 40 min   Stress: No Stress Concern Present (2023)    Citizen of [place] [employer] - Occupational Stress Questionnaire     Feeling of Stress : Only a little   Housing Stability: Low Risk  (2023)    Housing Stability Vital Sign     Unable to Pay for Housing in the Last Year: No     Number of Places Lived in the Last Year: 1     Unstable Housing in the Last Year: No       Current Outpatient Medications   Medication Sig Dispense Refill    albuterol (PROVENTIL) 2.5 mg /3 mL (0.083 %) nebulizer solution Take 3 mLs (2.5 mg total) by nebulization every 6 (six) hours as needed for Wheezing. Rescue 180 mL 3    albuterol (PROVENTIL/VENTOLIN HFA) 90 mcg/actuation inhaler Inhale 2 puffs into the lungs every 4 (four) hours as needed for Wheezing or Shortness of Breath. 18 g 11    budesonide (PULMICORT) 0.5 mg/2 mL nebulizer solution Take 2 mLs (0.5 mg total) by nebulization 2 (two) times daily.  "Controller 120 mL 0    diphenhydrAMINE (BENADRYL) 25 mg capsule Take 50 mg by mouth nightly as needed for Itching.      fluticasone-umeclidin-vilanter (TRELEGY ELLIPTA) 200-62.5-25 mcg inhaler Inhale 1 puff into the lungs once daily. 180 each 3    predniSONE (DELTASONE) 20 MG tablet Take one pill a day for three days, repeat for shortness of breath 12 tablet 0    levothyroxine (SYNTHROID) 50 MCG tablet Take 1 tablet (50 mcg total) by mouth once daily. Except take 1.5 tablets one day per week 100 tablet 1    pantoprazole (PROTONIX) 40 MG tablet Take 1 tablet (40 mg total) by mouth once daily. 90 tablet 1    pitavastatin calcium (LIVALO) 2 mg Tab tablet Take 1 tablet (2 mg total) by mouth every evening. 90 tablet 1     No current facility-administered medications for this visit.       Review of patient's allergies indicates:  No Known Allergies  Objective:    HPI     Thyroid Problem     Additional comments: 5 months follow up          Last edited by Jia Lara MA on 5/22/2024 12:50 PM.      Blood pressure 120/72, pulse 73, temperature 96.6 °F (35.9 °C), temperature source Temporal, height 5' 2" (1.575 m), weight 93.6 kg (206 lb 5.6 oz), SpO2 98%. Body mass index is 37.74 kg/m².   Physical Exam  Vitals and nursing note reviewed.   Constitutional:       General: She is not in acute distress.     Appearance: She is well-developed. She is obese. She is not ill-appearing, toxic-appearing or diaphoretic.   HENT:      Head: Normocephalic and atraumatic.      Right Ear: Tympanic membrane, ear canal and external ear normal.      Left Ear: Tympanic membrane, ear canal and external ear normal.      Nose: Nose normal. No rhinorrhea.      Right Sinus: No maxillary sinus tenderness or frontal sinus tenderness.      Left Sinus: No maxillary sinus tenderness or frontal sinus tenderness.      Mouth/Throat:      Pharynx: No oropharyngeal exudate or posterior oropharyngeal erythema.      Tonsils: No tonsillar exudate.   Eyes:      " General: No scleral icterus.        Right eye: No discharge.         Left eye: No discharge.      Conjunctiva/sclera: Conjunctivae normal.   Neck:      Vascular: No carotid bruit.   Cardiovascular:      Rate and Rhythm: Normal rate and regular rhythm.      Heart sounds: Normal heart sounds. No murmur heard.     No friction rub. No gallop.   Pulmonary:      Effort: Pulmonary effort is normal. No tachypnea, accessory muscle usage or respiratory distress.      Breath sounds: Normal breath sounds. No decreased breath sounds, wheezing, rhonchi or rales.   Abdominal:      General: There is no distension.      Palpations: Abdomen is soft.      Tenderness: There is no abdominal tenderness. There is no guarding or rebound.   Musculoskeletal:      Right lower leg: No edema.      Left lower leg: No edema.   Skin:     General: Skin is warm and dry.   Neurological:      Mental Status: She is alert.      Motor: No tremor.   Psychiatric:         Mood and Affect: Mood normal.         Speech: Speech normal.         Behavior: Behavior normal.           Telephone on 05/16/2024   Component Date Value Ref Range Status    Glucose 05/21/2024 103 (H)  70 - 99 mg/dL Final    BUN 05/21/2024 12  8 - 27 mg/dL Final    Creatinine 05/21/2024 0.91  0.57 - 1.00 mg/dL Final    eGFR 05/21/2024 68  >59 mL/min/1.73 Final    BUN/Creatinine Ratio 05/21/2024 13  12 - 28 Final    Sodium 05/21/2024 143  134 - 144 mmol/L Final    Potassium 05/21/2024 4.2  3.5 - 5.2 mmol/L Final    Chloride 05/21/2024 105  96 - 106 mmol/L Final    CO2 05/21/2024 25  20 - 29 mmol/L Final    Calcium 05/21/2024 9.3  8.7 - 10.3 mg/dL Final    Protein, Total 05/21/2024 6.5  6.0 - 8.5 g/dL Final    Albumin 05/21/2024 4.2  3.9 - 4.9 g/dL Final    Globulin, Total 05/21/2024 2.3  1.5 - 4.5 g/dL Final    Albumin/Globulin Ratio 05/21/2024 1.8  1.2 - 2.2 Final    Total Bilirubin 05/21/2024 0.4  0.0 - 1.2 mg/dL Final    Alkaline Phosphatase 05/21/2024 161 (H)  44 - 121 IU/L Final    AST  05/21/2024 17  0 - 40 IU/L Final    ALT 05/21/2024 18  0 - 32 IU/L Final    Hemoglobin A1c 05/21/2024 6.0 (H)  4.8 - 5.6 % Final    Comment:          Prediabetes: 5.7 - 6.4           Diabetes: >6.4           Glycemic control for adults with diabetes: <7.0      Cholesterol 05/21/2024 238 (H)  100 - 199 mg/dL Final    Triglycerides 05/21/2024 159 (H)  0 - 149 mg/dL Final    HDL 05/21/2024 74  >39 mg/dL Final    VLDL Cholesterol Juan Manuel 05/21/2024 28  5 - 40 mg/dL Final    LDL Calculated 05/21/2024 136 (H)  0 - 99 mg/dL Final    TSH 05/21/2024 3.280  0.450 - 4.500 uIU/mL Final   ]  Assessment:       1. Acquired hypothyroidism    2. Gastroesophageal reflux disease, unspecified whether esophagitis present    3. Prediabetes    4. Menopause    5. Visit for screening mammogram    6. Pure hypercholesterolemia        Plan:       Michelle was seen today for thyroid problem.    Diagnoses and all orders for this visit:    Acquired hypothyroidism  Comments:  Has noted some pedal edema which was one of her symptoms before meds.  TSH trending up.  Will try and get it between 1-2  Orders:  -     levothyroxine (SYNTHROID) 50 MCG tablet; Take 1 tablet (50 mcg total) by mouth once daily. Except take 1.5 tablets one day per week    Gastroesophageal reflux disease, unspecified whether esophagitis present  Comments:  Failed H2 blocker  Orders:  -     pantoprazole (PROTONIX) 40 MG tablet; Take 1 tablet (40 mg total) by mouth once daily.    Prediabetes  Comments:  Discussed reducing carbs in diet.    Menopause  -     DXA Bone Density Axial Skeleton 1 or more sites; Future    Visit for screening mammogram  -     Mammo Digital Screening Bilat w/ Jerad; Future    Pure hypercholesterolemia  Comments:  Tolerating livalo  Orders:  -     pitavastatin calcium (LIVALO) 2 mg Tab tablet; Take 1 tablet (2 mg total) by mouth every evening.

## 2024-05-30 ENCOUNTER — HOSPITAL ENCOUNTER (OUTPATIENT)
Dept: RADIOLOGY | Facility: HOSPITAL | Age: 69
Discharge: HOME | End: 2024-05-30
Attending: INTERNAL MEDICINE
Payer: MEDICARE

## 2024-05-30 VITALS — BODY MASS INDEX: 37.91 KG/M2 | HEIGHT: 62 IN | WEIGHT: 206 LBS

## 2024-05-30 DIAGNOSIS — Z12.31 VISIT FOR SCREENING MAMMOGRAM: ICD-10-CM

## 2024-05-30 PROCEDURE — 77067 SCR MAMMO BI INCL CAD: CPT | Mod: TC,PO

## 2024-05-30 PROCEDURE — 77063 BREAST TOMOSYNTHESIS BI: CPT | Mod: 26,,, | Performed by: RADIOLOGY

## 2024-05-30 PROCEDURE — 77067 SCR MAMMO BI INCL CAD: CPT | Mod: 26,,, | Performed by: RADIOLOGY

## 2024-05-30 PROCEDURE — 77063 BREAST TOMOSYNTHESIS BI: CPT | Mod: TC,PO

## 2024-06-04 ENCOUNTER — OFFICE VISIT (OUTPATIENT)
Dept: URGENT CARE | Facility: CLINIC | Age: 69
End: 2024-06-04
Payer: MEDICARE

## 2024-06-04 ENCOUNTER — HOSPITAL ENCOUNTER (EMERGENCY)
Facility: HOSPITAL | Age: 69
Discharge: HOME | End: 2024-06-04
Attending: EMERGENCY MEDICINE
Payer: MEDICARE

## 2024-06-04 VITALS
HEART RATE: 98 BPM | SYSTOLIC BLOOD PRESSURE: 145 MMHG | OXYGEN SATURATION: 96 % | RESPIRATION RATE: 16 BRPM | TEMPERATURE: 99 F | DIASTOLIC BLOOD PRESSURE: 82 MMHG

## 2024-06-04 VITALS
BODY MASS INDEX: 36.8 KG/M2 | HEIGHT: 62 IN | OXYGEN SATURATION: 95 % | WEIGHT: 200 LBS | DIASTOLIC BLOOD PRESSURE: 63 MMHG | RESPIRATION RATE: 18 BRPM | TEMPERATURE: 99 F | SYSTOLIC BLOOD PRESSURE: 136 MMHG | HEART RATE: 71 BPM

## 2024-06-04 DIAGNOSIS — R07.9 CHEST PAIN, UNSPECIFIED TYPE: Primary | ICD-10-CM

## 2024-06-04 DIAGNOSIS — R07.9 CHEST PAIN: ICD-10-CM

## 2024-06-04 LAB
ALBUMIN SERPL BCP-MCNC: 4.3 G/DL (ref 3.5–5.2)
ALP SERPL-CCNC: 137 U/L (ref 55–135)
ALT SERPL W/O P-5'-P-CCNC: 28 U/L (ref 10–44)
ANION GAP SERPL CALC-SCNC: 8 MMOL/L (ref 8–16)
AST SERPL-CCNC: 28 U/L (ref 10–40)
BASOPHILS # BLD AUTO: 0.05 K/UL (ref 0–0.2)
BASOPHILS NFR BLD: 0.8 % (ref 0–1.9)
BILIRUB SERPL-MCNC: 0.5 MG/DL (ref 0.1–1)
BNP SERPL-MCNC: 79 PG/ML (ref 0–99)
BUN SERPL-MCNC: 11 MG/DL (ref 8–23)
CALCIUM SERPL-MCNC: 9.6 MG/DL (ref 8.7–10.5)
CHLORIDE SERPL-SCNC: 107 MMOL/L (ref 95–110)
CO2 SERPL-SCNC: 27 MMOL/L (ref 23–29)
CREAT SERPL-MCNC: 1 MG/DL (ref 0.5–1.4)
DIFFERENTIAL METHOD BLD: NORMAL
EOSINOPHIL # BLD AUTO: 0 K/UL (ref 0–0.5)
EOSINOPHIL NFR BLD: 0.5 % (ref 0–8)
ERYTHROCYTE [DISTWIDTH] IN BLOOD BY AUTOMATED COUNT: 14.2 % (ref 11.5–14.5)
EST. GFR  (NO RACE VARIABLE): >60 ML/MIN/1.73 M^2
GLUCOSE SERPL-MCNC: 112 MG/DL (ref 70–110)
HCT VFR BLD AUTO: 43.9 % (ref 37–48.5)
HGB BLD-MCNC: 14.6 G/DL (ref 12–16)
IMM GRANULOCYTES # BLD AUTO: 0.02 K/UL (ref 0–0.04)
IMM GRANULOCYTES NFR BLD AUTO: 0.3 % (ref 0–0.5)
LYMPHOCYTES # BLD AUTO: 1.4 K/UL (ref 1–4.8)
LYMPHOCYTES NFR BLD: 24.1 % (ref 18–48)
MAGNESIUM SERPL-MCNC: 2 MG/DL (ref 1.6–2.6)
MCH RBC QN AUTO: 30.5 PG (ref 27–31)
MCHC RBC AUTO-ENTMCNC: 33.3 G/DL (ref 32–36)
MCV RBC AUTO: 92 FL (ref 82–98)
MONOCYTES # BLD AUTO: 0.3 K/UL (ref 0.3–1)
MONOCYTES NFR BLD: 4.7 % (ref 4–15)
NEUTROPHILS # BLD AUTO: 4.1 K/UL (ref 1.8–7.7)
NEUTROPHILS NFR BLD: 69.6 % (ref 38–73)
NRBC BLD-RTO: 0 /100 WBC
PLATELET # BLD AUTO: 240 K/UL (ref 150–450)
PMV BLD AUTO: 9.9 FL (ref 9.2–12.9)
POTASSIUM SERPL-SCNC: 4.1 MMOL/L (ref 3.5–5.1)
PROT SERPL-MCNC: 7.2 G/DL (ref 6–8.4)
RBC # BLD AUTO: 4.79 M/UL (ref 4–5.4)
SODIUM SERPL-SCNC: 142 MMOL/L (ref 136–145)
TROPONIN I SERPL HS-MCNC: 3.5 PG/ML (ref 0–14.9)
TROPONIN I SERPL HS-MCNC: 3.7 PG/ML (ref 0–14.9)
WBC # BLD AUTO: 5.9 K/UL (ref 3.9–12.7)

## 2024-06-04 PROCEDURE — 25000242 PHARM REV CODE 250 ALT 637 W/ HCPCS: Performed by: EMERGENCY MEDICINE

## 2024-06-04 PROCEDURE — 85025 COMPLETE CBC W/AUTO DIFF WBC: CPT | Performed by: NURSE PRACTITIONER

## 2024-06-04 PROCEDURE — 80053 COMPREHEN METABOLIC PANEL: CPT | Performed by: NURSE PRACTITIONER

## 2024-06-04 PROCEDURE — 93005 ELECTROCARDIOGRAM TRACING: CPT | Performed by: INTERNAL MEDICINE

## 2024-06-04 PROCEDURE — 83880 ASSAY OF NATRIURETIC PEPTIDE: CPT | Performed by: NURSE PRACTITIONER

## 2024-06-04 PROCEDURE — 93010 ELECTROCARDIOGRAM REPORT: CPT | Mod: ,,, | Performed by: INTERNAL MEDICINE

## 2024-06-04 PROCEDURE — 84484 ASSAY OF TROPONIN QUANT: CPT | Performed by: NURSE PRACTITIONER

## 2024-06-04 PROCEDURE — 99204 OFFICE O/P NEW MOD 45 MIN: CPT | Mod: S$GLB,,,

## 2024-06-04 PROCEDURE — 83735 ASSAY OF MAGNESIUM: CPT | Performed by: NURSE PRACTITIONER

## 2024-06-04 PROCEDURE — 93000 ELECTROCARDIOGRAM COMPLETE: CPT | Mod: S$GLB,,,

## 2024-06-04 PROCEDURE — 25000003 PHARM REV CODE 250: Performed by: EMERGENCY MEDICINE

## 2024-06-04 PROCEDURE — 99285 EMERGENCY DEPT VISIT HI MDM: CPT | Mod: 25

## 2024-06-04 RX ORDER — NITROGLYCERIN 0.4 MG/1
0.4 TABLET SUBLINGUAL EVERY 5 MIN PRN
Status: DISCONTINUED | OUTPATIENT
Start: 2024-06-04 | End: 2024-06-04 | Stop reason: HOSPADM

## 2024-06-04 RX ORDER — ASPIRIN 325 MG
325 TABLET ORAL DAILY
Qty: 30 TABLET | Refills: 0 | Status: SHIPPED | OUTPATIENT
Start: 2024-06-04 | End: 2025-06-04

## 2024-06-04 RX ORDER — ACETAMINOPHEN 325 MG/1
325 TABLET ORAL EVERY 6 HOURS PRN
COMMUNITY

## 2024-06-04 RX ORDER — ASPIRIN 325 MG
325 TABLET ORAL
Status: COMPLETED | OUTPATIENT
Start: 2024-06-04 | End: 2024-06-04

## 2024-06-04 RX ADMIN — NITROGLYCERIN 0.4 MG: 0.4 TABLET SUBLINGUAL at 03:06

## 2024-06-04 RX ADMIN — ASPIRIN 325 MG ORAL TABLET 325 MG: 325 PILL ORAL at 03:06

## 2024-06-04 NOTE — FIRST PROVIDER EVALUATION
Emergency Department TeleTriage Encounter Note      CHIEF COMPLAINT    Chief Complaint   Patient presents with    Chest Pain     Chest pain since last night. Midsternal. Movement and deep breath make it worse       VITAL SIGNS   Initial Vitals [06/04/24 1148]   BP Pulse Resp Temp SpO2   (!) 164/107 80 18 98.9 °F (37.2 °C) 96 %      MAP       --            ALLERGIES    Review of patient's allergies indicates:  No Known Allergies    PROVIDER TRIAGE NOTE  Verbal consent for the teletriage evaluation was given by the patient at the start of the evaluation.  All efforts will be made to maintain patient's privacy during the evaluation.      This is a teletriage evaluation of a 68 y.o. female presenting to the ED with c/o midsternal CP that started last night at 7 pm.  Feels overall bad. Had tylenol and inhaler this AM with no improvement.  Limited physical exam via telehealth: The patient is awake, alert, answering questions appropriately and is not in respiratory distress.  As the Teletriage provider, I performed an initial assessment and ordered appropriate labs and imaging studies, if any, to facilitate the patient's care once placed in the ED. Once a room is available, care and a full evaluation will be completed by an alternate ED provider.  Any additional orders and the final disposition will be determined by that provider.  All imaging and labs will not be followed-up by the Teletriage Team, including myself.          ORDERS  Labs Reviewed   MAGNESIUM   CBC W/ AUTO DIFFERENTIAL   COMPREHENSIVE METABOLIC PANEL   TROPONIN I HIGH SENSITIVITY   TROPONIN I HIGH SENSITIVITY   B-TYPE NATRIURETIC PEPTIDE   URINALYSIS, REFLEX TO URINE CULTURE       ED Orders (720h ago, onward)      Start Ordered     Status Ordering Provider    06/04/24 1354 06/04/24 1153  Troponin I High Sensitivity #2  Once Timed         Ordered KEZIA DAVIS    06/04/24 1154 06/04/24 1153  Magnesium  STAT         Ordered KEZIA DAVIS    06/04/24 1154  06/04/24 1153  Vital signs  Every 15 min         Ordered KEZIA DAVIS    06/04/24 1154 06/04/24 1153  Cardiac Monitoring - Adult  Continuous        Comments: Notify Physician If:    Ordered KEZIA DAVIS    06/04/24 1154 06/04/24 1153  Pulse Oximetry Continuous  Continuous         Ordered KEZIA DAVIS    06/04/24 1154 06/04/24 1153  Diet NPO  Diet effective now         Ordered KEZIA DAVIS    06/04/24 1154 06/04/24 1153  Saline lock IV  Once         Ordered KEZIA DAVIS    06/04/24 1154 06/04/24 1153  EKG 12-lead  Once        Comments: Do not perform if previously done during this visit/ triage    Ordered KEZIA DAVIS    06/04/24 1154 06/04/24 1153  CBC auto differential  STAT         Ordered KEZIA DAVIS    06/04/24 1154 06/04/24 1153  Comprehensive metabolic panel  STAT         Ordered KEZIA DAVIS    06/04/24 1154 06/04/24 1153  Troponin I High Sensitivity #1  STAT         Ordered KEZIA DAVIS    06/04/24 1154 06/04/24 1153  B-Type natriuretic peptide (BNP)  STAT         Ordered KEZIA DAVIS    06/04/24 1154 06/04/24 1153  X-Ray Chest PA And Lateral  1 time imaging         Ordered KEZIA DAVIS    06/04/24 1154 06/04/24 1153  Urinalysis, Reflex to Urine Culture Urine, Clean Catch  STAT         Ordered KEZIA DAVIS              Virtual Visit Note: The provider triage portion of this emergency department evaluation and documentation was performed via [employer], a HIPAA-compliant telemedicine application, in concert with a tele-presenter in the room. A face to face patient evaluation with one of my colleagues will occur once the patient is placed in an emergency department room.      DISCLAIMER: This note was prepared with [employer] voice recognition transcription software. Garbled syntax, mangled pronouns, and other bizarre constructions may be attributed to that software system.

## 2024-06-04 NOTE — PROGRESS NOTES
Subjective:      Patient ID: Michelle Frazier is a 68 y.o. female.    Vitals:  vitals were not taken for this visit.     Chief Complaint: No chief complaint on file.     In clinic with a chief complaint of substernal chest pain with shortness a breath that began last night.  She states the pain is worse whenever she was standing up and leans forward.  She describes it as a headache in her chest.  She does have a history of asthma and has been using her albuterol for her symptoms with no significant improvement.  She was also stating she has a low-grade fever 99.3 T-max.  She took Tylenol this morning.  Denies cough.   Denies history of heart disease.  Also denies family history of heart disease. She does endorse excessive stress at home, and  at work    Constitution: Negative for fever.   Cardiovascular:  Positive for chest pain.   Respiratory:  Positive for shortness of breath and asthma. Negative for cough and sputum production.    Gastrointestinal: Negative.    Musculoskeletal: Negative.    Allergic/Immunologic: Positive for asthma.    Objective:     Physical Exam   Constitutional: She is oriented to person, place, and time. She appears well-developed. She is cooperative.   HENT:   Head: Normocephalic and atraumatic.   Ears:   Right Ear: Hearing and external ear normal.   Left Ear: Hearing and external ear normal.   Nose: Nose normal. No mucosal edema or nasal deformity. No epistaxis. Right sinus exhibits no maxillary sinus tenderness and no frontal sinus tenderness. Left sinus exhibits no maxillary sinus tenderness and no frontal sinus tenderness.   Mouth/Throat: Uvula is midline, oropharynx is clear and moist and mucous membranes are normal. Mucous membranes are moist. No trismus in the jaw. Normal dentition. No uvula swelling. Oropharynx is clear.   Eyes: Conjunctivae and lids are normal. Pupils are equal, round, and reactive to light. Extraocular movement intact   Neck: Trachea normal and phonation normal. Neck  supple.   Cardiovascular: Normal rate, regular rhythm, normal heart sounds and normal pulses.   Pulmonary/Chest: Effort normal and breath sounds normal.   Abdominal: Normal appearance.   Musculoskeletal: Normal range of motion.         General: Normal range of motion.   Neurological: no focal deficit. She is alert, oriented to person, place, and time and at baseline. She exhibits normal muscle tone.   Skin: Skin is warm, dry and intact. Capillary refill takes 2 to 3 seconds.   Psychiatric: Her speech is normal and behavior is normal. Mood, judgment and thought content normal.   Nursing note and vitals reviewed.    Assessment:     1. Chest pain, unspecified type        Plan:       Chest pain, unspecified type  -     EKG 12-lead; Future  -     X-Ray Chest PA And Lateral; Future; Expected date: 06/04/2024       EKG  performed in clinic normal sinus rhythm no STEMI   Substernal chest pain that began last night.  Worse with standing up and leaning forward.  She describes it as a headache in her chest.  Has  associated shortness a breath that she attributes to her asthma. Chest x-ray normal.  Referred to emergency room for further evaluation.          X-Ray Chest PA And Lateral    Result Date: 6/4/2024  EXAMINATION: XR CHEST PA AND LATERAL CLINICAL HISTORY: Chest pain, unspecified TECHNIQUE: PA and lateral views of the chest were performed. COMPARISON: 05/24/2023 FINDINGS: The cardiomediastinal silhouette is within normal limits.  The lungs are well expanded without consolidation or pleural effusion.  An old calcified granuloma is present in the left upper lobe.  Surgical clips are present in the right upper quadrant.     No active cardiopulmonary process.  No significant change. Electronically signed by: Lauro Davis MD Date:    06/04/2024 Time:    10:43

## 2024-06-04 NOTE — PHARMACY MED REC
"Admission Medication History     The home medication history was taken by Libra Jeff.    You may go to "Admission" then "Reconcile Home Medications" tabs to review and/or act upon these items.     The home medication list has been updated by the Pharmacy department.   Please read ALL comments highlighted in yellow.   Please address this information as you see fit.    Feel free to contact us if you have any questions or require assistance.        Medications listed below were obtained from: Patient/family and Analytic software- [employer]  No current facility-administered medications on file prior to encounter.     Current Outpatient Medications on File Prior to Encounter   Medication Sig Dispense Refill    acetaminophen (TYLENOL) 325 MG tablet Take 325 mg by mouth every 6 (six) hours as needed for Pain.      albuterol (PROVENTIL) 2.5 mg /3 mL (0.083 %) nebulizer solution Take 3 mLs (2.5 mg total) by nebulization every 6 (six) hours as needed for Wheezing. Rescue 180 mL 3    albuterol (PROVENTIL/VENTOLIN HFA) 90 mcg/actuation inhaler Inhale 2 puffs into the lungs every 4 (four) hours as needed for Wheezing or Shortness of Breath. 18 g 11    budesonide (PULMICORT) 0.5 mg/2 mL nebulizer solution Take 2 mLs (0.5 mg total) by nebulization 2 (two) times daily. Controller 120 mL 0    diphenhydrAMINE (BENADRYL) 25 mg capsule Take 50 mg by mouth nightly as needed for Itching.      fluticasone-umeclidin-vilanter (TRELEGY ELLIPTA) 200-62.5-25 mcg inhaler Inhale 1 puff into the lungs once daily. 180 each 3    levothyroxine (SYNTHROID) 50 MCG tablet Take 1 tablet (50 mcg total) by mouth once daily. Except take 1.5 tablets one day per week (Patient taking differently: Take 50 mcg by mouth once daily. Except take 1.5 tablets one day per week on Saturdays) 100 tablet 1    pantoprazole (PROTONIX) 40 MG tablet Take 1 tablet (40 mg total) by mouth once daily. 90 tablet 1    pitavastatin calcium (LIVALO) 2 mg Tab tablet Take 1 " tablet (2 mg total) by mouth every evening. 90 tablet 1    predniSONE (DELTASONE) 20 MG tablet Take one pill a day for three days, repeat for shortness of breath (Patient taking differently: Take 20 mg by mouth daily as needed. Take one pill a day for three days, repeat for shortness of breath) 12 tablet 0     Libra Jeff  EXT 1924                  .

## 2024-06-04 NOTE — ED PROVIDER NOTES
Encounter Date: 2024       History     Chief Complaint   Patient presents with    Chest Pain     Chest pain since last night. Midsternal. Movement and deep breath make it worse     This is a 68-year-old female presenting with chest pain.  Pain began last night.  Pain is anterior, substernal pain.  It does not radiate.  It was exacerbated with bending and turning, palpation, and with coughing.  She does have some associated shortness of breath.  She denies history of cardiac disease.    The history is provided by the patient.     Review of patient's allergies indicates:  No Known Allergies  Past Medical History:   Diagnosis Date    Arthralgia of multiple joints 10/1/2020    Diverticulosis of intestine 10/1/2020    Dyslipidemia 10/1/2020    GERD (gastroesophageal reflux disease)     Hypothyroidism      Past Surgical History:   Procedure Laterality Date    APPENDECTOMY      CARPAL TUNNEL RELEASE Right     CHOLECYSTECTOMY      COLONOSCOPY      EYE SURGERY      cataracts    LAPAROSCOPIC CHOLECYSTECTOMY N/A 2022    Procedure: CHOLECYSTECTOMY, LAPAROSCOPIC;  Surgeon: Cristian Park Jr., MD;  Location: [facility];  Service: General;  Laterality: N/A;     Family History   Problem Relation Name Age of Onset    Arthritis Mother      Hearing loss Mother      Vision loss Mother      Arthritis Father      Colon cancer Paternal Grandmother          >80    Breast cancer Paternal Grandmother  65     Social History     Tobacco Use    Smoking status: Former     Current packs/day: 0.00     Types: Cigarettes     Quit date: 2012     Years since quittin.0    Smokeless tobacco: Never   Substance Use Topics    Alcohol use: No    Drug use: No     Review of Systems   Respiratory:  Positive for shortness of breath.    Cardiovascular:  Positive for chest pain.   All other systems reviewed and are negative.      Physical Exam     Initial Vitals [24 1148]   BP Pulse Resp Temp SpO2   (!) 164/107 80 18 98.9 °F (37.2 °C)  96 %      MAP       --         Physical Exam    Nursing note and vitals reviewed.  Constitutional: She appears well-developed and well-nourished. She is not diaphoretic. No distress.   HENT:   Head: Normocephalic.   Eyes: Conjunctivae are normal.   Neck: Neck supple.   Normal range of motion.  Cardiovascular:  Normal rate.           Pulmonary/Chest: No respiratory distress. She exhibits tenderness.   Abdominal: She exhibits no distension.   Musculoskeletal:         General: No edema.      Cervical back: Normal range of motion and neck supple.     Neurological: She is alert. She has normal strength.   Skin: Skin is warm and dry.   Psychiatric: She has a normal mood and affect.         ED Course   Procedures  Labs Reviewed   COMPREHENSIVE METABOLIC PANEL - Abnormal; Notable for the following components:       Result Value    Glucose 112 (*)     Alkaline Phosphatase 137 (*)     All other components within normal limits   MAGNESIUM   CBC W/ AUTO DIFFERENTIAL   TROPONIN I HIGH SENSITIVITY   TROPONIN I HIGH SENSITIVITY   B-TYPE NATRIURETIC PEPTIDE   URINALYSIS, REFLEX TO URINE CULTURE     EKG Readings: (Independently Interpreted)   Sinus rhythm.  Seventy-nine beats/minute.  Normal axis.  No ST elevation.     ECG Results              EKG 12-lead (In process)        Collection Time Result Time QRS Duration OHS QTC Calculation    06/04/24 11:23:56 06/04/24 12:43:51 74 442                     In process by Interface, Lab In [facility] (06/04/24 12:44:01)                   Narrative:    Test Reason : R07.9,    Vent. Rate : 079 BPM     Atrial Rate : 079 BPM     P-R Int : 144 ms          QRS Dur : 074 ms      QT Int : 386 ms       P-R-T Axes : 060 020 030 degrees     QTc Int : 442 ms    Normal sinus rhythm  Low voltage QRS  Borderline Abnormal ECG  When compared with ECG of 11-NOV-2020 11:01,  Nonspecific T wave abnormality no longer evident in Lateral leads    Referred By: [email or web address]   SELF           Confirmed By:                                    Imaging Results              X-Ray Chest PA And Lateral (Final result)  Result time 06/04/24 12:20:40      Final result by Isaías Corey MD (06/04/24 12:20:40)                   Impression:      No acute cardiac or pulmonary process.      Electronically signed by: Isaías Corey  Date:    06/04/2024  Time:    12:20               Narrative:    CLINICAL HISTORY:  ([id number])69 y/o  (1955) F    Chest Pain;    TECHNIQUE:  (A#[id number], exam time 6/4/2024 12:12)    XR CHEST PA AND LATERAL [id number]    COMPARISON:  Radiograph from 06/04/2024.    FINDINGS:  The lungs are clear. Costophrenic angles are seen without effusion. No pneumothorax is identified. The heart is normal in size. The mediastinum is within normal limits. Osseous structures appear within normal limits. There are clips in the right upper abdominal quadrant consistent with prior biliary surgery.                                       Medications   aspirin tablet 325 mg (325 mg Oral Given 6/4/24 1512)     Medical Decision Making  68-year-old female with chest pain since last night.  Her EKG shows no ischemic changes.  CXR is clear.  Initial troponin is normal.  She was given 325 mg aspirin and nitroglycerin.  Her chest pain has resolved.  The pain was atypical as it was reproducible and pleuritic.  Heart score is 3-4.  Repeat troponin remains normal.  She is comfortable going home.  I will arrange for outpatient stress test this week and referred to cardiology clinic.  She understands to return here for recurrent chest pain.    Amount and/or Complexity of Data Reviewed  Radiology: ordered.    Risk  OTC drugs.  Prescription drug management.      Additional MDM:   Heart Score:    History:          Slightly suspicious.  ECG:             Normal  Age:               >65 years  Risk factors: 1-2 risk factors  Troponin:       Less than or equal to normal limit  Heart Score = 3                                       Clinical Impression:  Final  diagnoses:  [R07.9] Chest pain          ED Disposition Condition    Discharge Stable          ED Prescriptions       Medication Sig Dispense Start Date End Date Auth. Provider    aspirin 325 MG tablet Take 1 tablet (325 mg total) by mouth once daily. 30 tablet 6/4/2024 6/4/2025 Mateo Kinsey MD          Follow-up Information       Follow up With Specialties Details Why Contact Info    Zan Morelos MD Interventional Cardiology, Cardiology, Cardiovascular Disease   [address] [facility]  [address] [place], [place] [id number]  [phone]               Mateo Kinsey MD  06/04/24 2009

## 2024-06-04 NOTE — DISCHARGE INSTRUCTIONS
Return to the ER for recurrent chest pain, or for any other concerns.  Follow-up with outpatient stress test and cardiology clinic.

## 2024-06-05 ENCOUNTER — TELEPHONE (OUTPATIENT)
Dept: FAMILY MEDICINE | Facility: CLINIC | Age: 69
End: 2024-06-05
Payer: MEDICARE

## 2024-06-05 ENCOUNTER — PATIENT OUTREACH (OUTPATIENT)
Dept: EMERGENCY MEDICINE | Facility: HOSPITAL | Age: 69
End: 2024-06-05

## 2024-06-05 NOTE — TELEPHONE ENCOUNTER
----- Message from Delmi Sharpe sent at 6/5/2024  2:54 PM CDT -----  Regarding: ED Follow Up  Hi,     This patient was seen in the ED on 06/04/24 and needs a 7-day follow up appointment. I was unable to schedule a visit in Epic.  Could you please assist in scheduling an appointment for her.  Thank you!     Delmi Sharpe  ED Navigator

## 2024-06-05 NOTE — TELEPHONE ENCOUNTER
Will give pt information to discharge clinic to schedule pt. Dr. Beltre do not have an ER f/u in 7 days.

## 2024-06-06 LAB
OHS QRS DURATION: 74 MS
OHS QTC CALCULATION: 442 MS

## 2024-06-10 ENCOUNTER — HOSPITAL ENCOUNTER (OUTPATIENT)
Dept: RADIOLOGY | Facility: HOSPITAL | Age: 69
Discharge: HOME | End: 2024-06-10
Attending: INTERNAL MEDICINE
Payer: MEDICARE

## 2024-06-10 DIAGNOSIS — Z78.0 MENOPAUSE: ICD-10-CM

## 2024-06-10 PROCEDURE — 77080 DXA BONE DENSITY AXIAL: CPT | Mod: TC,PO

## 2024-06-10 PROCEDURE — 77080 DXA BONE DENSITY AXIAL: CPT | Mod: 26,,, | Performed by: RADIOLOGY

## 2024-06-17 ENCOUNTER — TELEPHONE (OUTPATIENT)
Dept: CARDIOLOGY | Facility: CLINIC | Age: 69
End: 2024-06-17
Payer: MEDICARE

## 2024-06-17 NOTE — TELEPHONE ENCOUNTER
----- Message from Baldo Fletchre sent at 6/17/2024  2:49 PM CDT -----  Regarding: hospital f/u  Contact: patient  Type:  Sooner Apoointment Request    Caller is requesting a sooner appointment.  Caller declined first available appointment listed below.  Caller will not accept being placed on the waitlist and is requesting a message be sent to doctor.  Name of Caller:patient  When is the first available appointment?unable to schedule in July   Symptoms:hospital f/u chest pain  Would the patient rather a call back or a response via [facility]? Patient requesting a appointment in July  Best Call Back Number:[phone]  Additional Information:

## 2024-06-30 DIAGNOSIS — E78.00 PURE HYPERCHOLESTEROLEMIA: ICD-10-CM

## 2024-06-30 RX ORDER — PITAVASTATIN CALCIUM 2.09 MG/1
2 TABLET, FILM COATED ORAL NIGHTLY
Qty: 90 TABLET | Refills: 1 | Status: CANCELLED | OUTPATIENT
Start: 2024-06-30

## 2024-07-12 ENCOUNTER — OFFICE VISIT (OUTPATIENT)
Dept: CARDIOLOGY | Facility: CLINIC | Age: 69
End: 2024-07-12
Payer: MEDICARE

## 2024-07-12 VITALS
BODY MASS INDEX: 38.09 KG/M2 | HEART RATE: 80 BPM | WEIGHT: 207 LBS | DIASTOLIC BLOOD PRESSURE: 90 MMHG | HEIGHT: 62 IN | OXYGEN SATURATION: 99 % | RESPIRATION RATE: 16 BRPM | SYSTOLIC BLOOD PRESSURE: 138 MMHG

## 2024-07-12 DIAGNOSIS — R07.9 CHEST PAIN, UNSPECIFIED TYPE: Primary | ICD-10-CM

## 2024-07-12 DIAGNOSIS — I10 ESSENTIAL HYPERTENSION: ICD-10-CM

## 2024-07-12 DIAGNOSIS — E78.2 MIXED HYPERLIPIDEMIA: ICD-10-CM

## 2024-07-12 DIAGNOSIS — R94.31 NONSPECIFIC ABNORMAL ELECTROCARDIOGRAM (ECG) (EKG): ICD-10-CM

## 2024-07-12 DIAGNOSIS — E66.01 SEVERE OBESITY: ICD-10-CM

## 2024-07-12 DIAGNOSIS — R06.09 DYSPNEA ON EXERTION: ICD-10-CM

## 2024-07-12 DIAGNOSIS — E03.9 ACQUIRED HYPOTHYROIDISM: ICD-10-CM

## 2024-07-12 DIAGNOSIS — J45.20 MILD INTERMITTENT ASTHMA WITHOUT COMPLICATION: ICD-10-CM

## 2024-07-12 PROCEDURE — 99214 OFFICE O/P EST MOD 30 MIN: CPT | Mod: PBBFAC,PN | Performed by: INTERNAL MEDICINE

## 2024-07-12 PROCEDURE — 99999 PR PBB SHADOW E&M-EST. PATIENT-LVL IV: CPT | Mod: PBBFAC,,, | Performed by: INTERNAL MEDICINE

## 2024-07-12 NOTE — PROGRESS NOTES
Subjective:    Patient ID:  Michelle Frazier is a 68 y.o. female     Chief Complaint   Patient presents with    Establish Care    Chest Pain     68-year-old female with chest pain since last night. Her EKG shows no ischemic changes. CXR is clear. Initial troponin is normal. She was given 325 mg aspirin and nitroglycerin. Her chest pain has resolved. The pain was atypical as it was reproducible and pleuritic. Heart score is 3-4. Repeat troponin remains normal. She is comfortable going home. I will arrange for outpatient stress test this week and referred to cardiology clinic. She understands to return here for recurrent chest pain.     HPI:  Ms Michelle Frazier is a 68 y.o. female is here for initial consultation  Patient had chest discomfort in the month of June around on 7th.  That time patient initially started having chest discomfort while she was sitting in the chair and she had this discomfort which felt more like a pushing sensation which is more like a pressure sensation.  It lasted for a while and that night patient went to bed.  The next morning she woke up and she continued to have this discomfort in the following day she had this episode getting worse and she also developed nausea and diaphoresis and felt lightheaded and dizzy at which time patient got scared and went to the to the urgent care center from where she was referred to the hospital.  Patient was evaluated in the hospital and was essentially discharged home.  While she was in the hospital patient was given sublingual nitroglycerin with which her symptoms subsided significantly right it within couple of minutes.    Patient had intermittent episodes of mild chest discomfort but not to the same magnitude as she has had the 1st time around.    At the present time patient is comfortable she denies having any chest pain or tightness or heaviness her breathing is stable.  Denies any loss of consciousness falls or head injury.    Patient does have significant  shortness of breath and dyspnea on exertion.    Patient also has history of having COVID in the past and she has had severe COVID for which she was hospitalized and was also in the hospital for period of time and it took months for her to recover.    At the same time as she has had COVID she was also diagnosed with having asthma.  And she has never had the diagnosis prior to that.        Review of patient's allergies indicates:  No Known Allergies    Past Medical History:   Diagnosis Date    Arthralgia of multiple joints 10/1/2020    Diverticulosis of intestine 10/1/2020    Dyslipidemia 10/1/2020    GERD (gastroesophageal reflux disease)     Hypothyroidism      Past Surgical History:   Procedure Laterality Date    APPENDECTOMY      CARPAL TUNNEL RELEASE Right     CHOLECYSTECTOMY      COLONOSCOPY      EYE SURGERY      cataracts    LAPAROSCOPIC CHOLECYSTECTOMY N/A 2022    Procedure: CHOLECYSTECTOMY, LAPAROSCOPIC;  Surgeon: Cristian Park Jr., MD;  Location: [facility];  Service: General;  Laterality: N/A;     Social History     Tobacco Use    Smoking status: Former     Current packs/day: 0.00     Types: Cigarettes     Quit date: 2012     Years since quittin.1    Smokeless tobacco: Never   Substance Use Topics    Alcohol use: No    Drug use: No     Family History   Problem Relation Name Age of Onset    Arthritis Mother      Hearing loss Mother      Vision loss Mother      Arthritis Father      Colon cancer Paternal Grandmother          >80    Breast cancer Paternal Grandmother  65        Review of Systems:   Constitution: Negative for diaphoresis and fever.   HEENT: Negative for nosebleeds.    Cardiovascular: Negative for chest pain       No dyspnea on exertion       No leg swelling        No palpitations  Respiratory: Negative for shortness of breath and wheezing.    Hematologic/Lymphatic: Negative for bleeding problem. Does not bruise/bleed easily.   Skin: Negative for color change and rash.    Musculoskeletal: Negative for falls and myalgias.   Gastrointestinal: Negative for hematemesis and hematochezia.   Genitourinary: Negative for hematuria.   Neurological: Negative for dizziness and light-headedness.   Psychiatric/Behavioral: Negative for altered mental status and memory loss.          Objective:        Vitals:    07/12/24 1440   BP: (!) 138/90   Pulse: 80   Resp: 16       Lab Results   Component Value Date    WBC 5.90 06/04/2024    HGB 14.6 06/04/2024    HCT 43.9 06/04/2024     06/04/2024    CHOL 238 (H) 05/21/2024    TRIG 159 (H) 05/21/2024    HDL 74 05/21/2024    ALT 28 06/04/2024    AST 28 06/04/2024     06/04/2024    K 4.1 06/04/2024     06/04/2024    CREATININE 1.0 06/04/2024    BUN 11 06/04/2024    CO2 27 06/04/2024    TSH 3.280 05/21/2024    HGBA1C 6.0 (H) 05/21/2024        ECHOCARDIOGRAM RESULTS  No results found for this or any previous visit.        CURRENT/PREVIOUS VISIT EKG  Results for orders placed or performed during the hospital encounter of 06/04/24   EKG 12-lead    Collection Time: 06/04/24 11:23 AM   Result Value Ref Range    QRS Duration 74 ms    [facility]HS QTC Calculation 442 ms    Narrative    Test Reason : R07.9,    Vent. Rate : 079 BPM     Atrial Rate : 079 BPM     P-R Int : 144 ms          QRS Dur : 074 ms      QT Int : 386 ms       P-R-T Axes : 060 020 030 degrees     QTc Int : 442 ms    Normal sinus rhythm  Low voltage QRS  Borderline Abnormal ECG  When compared with ECG of 11-NOV-2020 11:01,  Nonspecific T wave abnormality no longer evident in Lateral leads  Confirmed by Lito Morelos MD ([id number]) on 6/6/2024 10:41:30 PM    Referred By: SHERLYN   SELF           Confirmed By:Lito Morelos MD     No valid procedures specified.   No results found for this or any previous visit.      Physical Exam:  CONSTITUTIONAL: No fever, no chills  HEENT: Normocephalic, atraumatic,pupils reactive to light                 NECK:  No JVD no carotid bruit  CVS: S1S2+, RRR,  systolic murmurs,   LUNGS: Clear  ABDOMEN: Soft, NT, BS+  EXTREMITIES: No cyanosis, edema  : No cottrell catheter  NEURO: AAO X 3  PSY: Normal affect      Medication List with Changes/Refills   Current Medications    ACETAMINOPHEN (TYLENOL) 325 MG TABLET    Take 325 mg by mouth every 6 (six) hours as needed for Pain.    ALBUTEROL (PROVENTIL) 2.5 MG /3 ML (0.083 %) NEBULIZER SOLUTION    Take 3 mLs (2.5 mg total) by nebulization every 6 (six) hours as needed for Wheezing. Rescue    ALBUTEROL (PROVENTIL/VENTOLIN HFA) 90 MCG/ACTUATION INHALER    Inhale 2 puffs into the lungs every 4 (four) hours as needed for Wheezing or Shortness of Breath.    ASPIRIN 325 MG TABLET    Take 1 tablet (325 mg total) by mouth once daily.    BUDESONIDE (PULMICORT) 0.5 MG/2 ML NEBULIZER SOLUTION    Take 2 mLs (0.5 mg total) by nebulization 2 (two) times daily. Controller    DIPHENHYDRAMINE (BENADRYL) 25 MG CAPSULE    Take 50 mg by mouth nightly as needed for Itching.    FLUTICASONE-UMECLIDIN-VILANTER (TRELEGY ELLIPTA) 200-62.5-25 MCG INHALER    Inhale 1 puff into the lungs once daily.    LEVOTHYROXINE (SYNTHROID) 50 MCG TABLET    Take 1 tablet (50 mcg total) by mouth once daily. Except take 1.5 tablets one day per week    PANTOPRAZOLE (PROTONIX) 40 MG TABLET    Take 1 tablet (40 mg total) by mouth once daily.    PITAVASTATIN CALCIUM (LIVALO) 2 MG TAB TABLET    Take 1 tablet (2 mg total) by mouth every evening.    PREDNISONE (DELTASONE) 20 MG TABLET    Take one pill a day for three days, repeat for shortness of breath             Assessment:       1. Chest pain    2. Dyspnea on exertion    3. Mild intermittent asthma without complication    4. Essential hypertension    5. Mixed hyperlipidemia    6. Severe obesity    7. Acquired hypothyroidism    8. Nonspecific abnormal electrocardiogram (ECG) (EKG)         Plan:   1. Chest pain   Patient has had significant chest discomfort.  And it was essentially relieved with sublingual nitroglycerin.     Need further investigation she would need exercise stress Cardiolite study to evaluate for ischemia.  Patient is currently on aspirin 325 mg p.o. daily patient to switch over to 81 mg chewable aspirin to take with meals she will take 1 in the morning and 1 in the evening with meals.    2. Dyspnea on exertion   Patient id the present time is stable.  She has had prior COVID and she had prolonged recovery. .  At the same time patient was also diagnosed with asthma.    She is currently on albuterol and Proventil continue the same she is also on Pulmicort.  She does follow-up with the primary pulmonologist.      3. Essential hypertension   Patient's blood pressure is stable at 130 8/90.  At the present time she has not on any specific blood pressure medications.  Patient to check her blood pressure while she is at home and to check it as soon as she wakes up in the morning, before breakfast, before lunch, before dinner and bedtime for 1 week and will readjust the medications accordingly.    4. Mixed hyperlipidemia   Patient is currently not on any specific statin therapy as she was intolerant to statins.  On her last blood work her total cholesterol was 238 HDL was 74 and triglycerides were 159.  In the LDL is 136.    Goal to bring down the LDL to target of less than 100 preferably at 70.    5. Acquired hypothyroidism   Patient is currently on levothyroxine 50 mcg p.o. daily continue the same.  She follows up with the primary physician who is checking her thyroid function tests.    6. EKG   Reviewed EKG independently patient is in normal sinus rhythm with a heart rate of 79 beats per minute normal intervals low-voltage QRS and nonspecific ST T-wave changes.    7. I will see her back in the office after the testing has been completed.              Problem List Items Addressed This Visit          Pulmonary    Asthma       Endocrine    Hypothyroidism     Other Visit Diagnoses       Chest pain    -  Primary    Dyspnea on  exertion        Essential hypertension        Mixed hyperlipidemia        Severe obesity        Nonspecific abnormal electrocardiogram (ECG) (EKG)                No follow-ups on file.

## 2024-07-17 ENCOUNTER — TELEPHONE (OUTPATIENT)
Dept: CARDIOLOGY | Facility: CLINIC | Age: 69
End: 2024-07-17
Payer: MEDICARE

## 2024-07-17 DIAGNOSIS — R07.9 CHEST PAIN, UNSPECIFIED TYPE: Primary | ICD-10-CM

## 2024-07-17 NOTE — TELEPHONE ENCOUNTER
----- Message from Fabrice Dumont sent at 7/17/2024 10:33 AM CDT -----  Regarding: stress test  Contact: michelle at [phone]  Type: Needs Medical Advice    Who Called:  Michelle De La Rosa Call Back Number: [phone]    Additional Information: at LOV 7/12/24 pt was told would need Stress Test but Ms Felix was gone for the day. Pt was told would get call to schedule test. No orders on chart for test. Please place orders and call pt to schedule.

## 2024-07-22 ENCOUNTER — TELEPHONE (OUTPATIENT)
Dept: CARDIOLOGY | Facility: CLINIC | Age: 69
End: 2024-07-22
Payer: MEDICARE

## 2024-07-22 NOTE — TELEPHONE ENCOUNTER
----- Message from Haroon Braun sent at 7/22/2024 10:15 AM CDT -----  Contact: Self  Type: Needs Medical Advice  Who Called:  Patient    Best Call Back Number: [phone]  Additional Information: Patient has a stress test scheduled for tomorrow, and she was calling in regards to confirming it, She checked her Mychart and it shows that the appt is scheduled in [place], and she is trying to get it scheduled in [place]. She is requesting a call back for confirmation

## 2024-07-23 ENCOUNTER — HOSPITAL ENCOUNTER (OUTPATIENT)
Dept: CARDIOLOGY | Facility: HOSPITAL | Age: 69
Discharge: HOME | End: 2024-07-23
Attending: INTERNAL MEDICINE
Payer: MEDICARE

## 2024-07-23 DIAGNOSIS — R07.9 CHEST PAIN, UNSPECIFIED TYPE: ICD-10-CM

## 2024-07-23 LAB
CV STRESS BASE HR: 89 BPM
DIASTOLIC BLOOD PRESSURE: 84 MMHG
OHS CV CPX 1 MINUTE RECOVERY HEART RATE: 133 BPM
OHS CV CPX 85 PERCENT MAX PREDICTED HEART RATE MALE: 128
OHS CV CPX ESTIMATED METS: 5
OHS CV CPX MAX PREDICTED HEART RATE: 151
OHS CV CPX PATIENT IS FEMALE: 1
OHS CV CPX PATIENT IS MALE: 0
OHS CV CPX PEAK DIASTOLIC BLOOD PRESSURE: 86 MMHG
OHS CV CPX PEAK HEAR RATE: 151 BPM
OHS CV CPX PEAK RATE PRESSURE PRODUCT: NORMAL
OHS CV CPX PEAK SYSTOLIC BLOOD PRESSURE: 140 MMHG
OHS CV CPX PERCENT MAX PREDICTED HEART RATE ACHIEVED: 104
OHS CV CPX RATE PRESSURE PRODUCT PRESENTING: NORMAL
STRESS ECHO POST EXERCISE DUR MIN: 3 MINUTES
STRESS ECHO POST EXERCISE DUR SEC: 0 SECONDS
STRESS ST DEPRESSION: 0.8 MM
SYSTOLIC BLOOD PRESSURE: 136 MMHG

## 2024-07-23 PROCEDURE — 93016 CV STRESS TEST SUPVJ ONLY: CPT | Mod: ,,,

## 2024-07-23 PROCEDURE — 93017 CV STRESS TEST TRACING ONLY: CPT

## 2024-07-23 PROCEDURE — 93018 CV STRESS TEST I&R ONLY: CPT | Mod: ,,, | Performed by: INTERNAL MEDICINE

## 2024-07-29 ENCOUNTER — TELEPHONE (OUTPATIENT)
Dept: CARDIOLOGY | Facility: CLINIC | Age: 69
End: 2024-07-29
Payer: MEDICARE

## 2024-07-29 NOTE — TELEPHONE ENCOUNTER
----- Message from Kaya Quinn sent at 7/29/2024  9:37 AM CDT -----  Contact: pt [phone]  Type:  Test Results    Who Called:  Pt   Name of Test (Lab/Mammo/Etc):  Stress test   Date of Test:  07/23  Ordering Provider:  Jethro   Where the test was performed:  [facility]  Best Call Back Number:  [phone]    Additional Information:  Pls call back and advise

## 2024-08-01 ENCOUNTER — OFFICE VISIT (OUTPATIENT)
Dept: CARDIOLOGY | Facility: CLINIC | Age: 69
End: 2024-08-01
Payer: MEDICARE

## 2024-08-01 VITALS
WEIGHT: 206 LBS | DIASTOLIC BLOOD PRESSURE: 88 MMHG | OXYGEN SATURATION: 98 % | HEIGHT: 62 IN | RESPIRATION RATE: 16 BRPM | BODY MASS INDEX: 37.91 KG/M2 | HEART RATE: 76 BPM | SYSTOLIC BLOOD PRESSURE: 140 MMHG

## 2024-08-01 DIAGNOSIS — J45.20 MILD INTERMITTENT ASTHMA WITHOUT COMPLICATION: ICD-10-CM

## 2024-08-01 DIAGNOSIS — I10 ESSENTIAL HYPERTENSION: ICD-10-CM

## 2024-08-01 DIAGNOSIS — E03.9 ACQUIRED HYPOTHYROIDISM: ICD-10-CM

## 2024-08-01 DIAGNOSIS — R07.9 CHEST PAIN, UNSPECIFIED TYPE: Primary | ICD-10-CM

## 2024-08-01 DIAGNOSIS — R06.09 DYSPNEA ON EXERTION: ICD-10-CM

## 2024-08-01 DIAGNOSIS — E66.01 SEVERE OBESITY: ICD-10-CM

## 2024-08-01 DIAGNOSIS — R94.31 NONSPECIFIC ABNORMAL ELECTROCARDIOGRAM (ECG) (EKG): ICD-10-CM

## 2024-08-01 DIAGNOSIS — E78.2 MIXED HYPERLIPIDEMIA: ICD-10-CM

## 2024-08-01 PROCEDURE — 99999 PR PBB SHADOW E&M-EST. PATIENT-LVL IV: CPT | Mod: PBBFAC,,, | Performed by: INTERNAL MEDICINE

## 2024-08-01 PROCEDURE — 99214 OFFICE O/P EST MOD 30 MIN: CPT | Mod: PBBFAC,PN | Performed by: INTERNAL MEDICINE

## 2024-08-01 PROCEDURE — 99214 OFFICE O/P EST MOD 30 MIN: CPT | Mod: S$PBB,,, | Performed by: INTERNAL MEDICINE

## 2024-08-01 NOTE — PROGRESS NOTES
Subjective:    Patient ID:  Michelle Frazier is a 68 y.o. female     Chief Complaint   Patient presents with    Results       HPI:  Ms Michelle Frazier is a 68 y.o. female is here for follow-up.    Patient recently had a stress test and is here for the results.    Patient denies having any chest pain or tightness or heaviness denies any dizziness or lightheadedness denies any loss of consciousness falls or head injury.    She is taking all her medications regularly.        Review of patient's allergies indicates:  No Known Allergies    Past Medical History:   Diagnosis Date    Arthralgia of multiple joints 10/1/2020    Diverticulosis of intestine 10/1/2020    Dyslipidemia 10/1/2020    GERD (gastroesophageal reflux disease)     Hypothyroidism      Past Surgical History:   Procedure Laterality Date    APPENDECTOMY      CARPAL TUNNEL RELEASE Right     CHOLECYSTECTOMY      COLONOSCOPY      EYE SURGERY      cataracts    LAPAROSCOPIC CHOLECYSTECTOMY N/A 2022    Procedure: CHOLECYSTECTOMY, LAPAROSCOPIC;  Surgeon: Cristian Park Jr., MD;  Location: [facility];  Service: General;  Laterality: N/A;     Social History     Tobacco Use    Smoking status: Former     Current packs/day: 0.00     Types: Cigarettes     Quit date: 2012     Years since quittin.1    Smokeless tobacco: Never   Substance Use Topics    Alcohol use: No    Drug use: No     Family History   Problem Relation Name Age of Onset    Arthritis Mother      Hearing loss Mother      Vision loss Mother      Arthritis Father      Colon cancer Paternal Grandmother          >80    Breast cancer Paternal Grandmother  65        Review of Systems:   Constitution: Negative for diaphoresis and fever.   HEENT: Negative for nosebleeds.    Cardiovascular: Negative for chest pain       No dyspnea on exertion       No leg swelling        No palpitations  Respiratory: Negative for shortness of breath and wheezing.    Hematologic/Lymphatic: Negative for bleeding problem.  Does not bruise/bleed easily.   Skin: Negative for color change and rash.   Musculoskeletal: Negative for falls and myalgias.   Gastrointestinal: Negative for hematemesis and hematochezia.   Genitourinary: Negative for hematuria.   Neurological: Negative for dizziness and light-headedness.   Psychiatric/Behavioral: Negative for altered mental status and memory loss.          Objective:        Vitals:    08/01/24 1535   BP: (!) 140/88   Pulse: 76   Resp: 16       Lab Results   Component Value Date    WBC 5.90 06/04/2024    HGB 14.6 06/04/2024    HCT 43.9 06/04/2024     06/04/2024    CHOL 238 (H) 05/21/2024    TRIG 159 (H) 05/21/2024    HDL 74 05/21/2024    ALT 28 06/04/2024    AST 28 06/04/2024     06/04/2024    K 4.1 06/04/2024     06/04/2024    CREATININE 1.0 06/04/2024    BUN 11 06/04/2024    CO2 27 06/04/2024    TSH 3.280 05/21/2024    HGBA1C 6.0 (H) 05/21/2024        ECHOCARDIOGRAM RESULTS  No results found for this or any previous visit.        CURRENT/PREVIOUS VISIT EKG  Results for orders placed or performed during the hospital encounter of 06/04/24   EKG 12-lead    Collection Time: 06/04/24 11:23 AM   Result Value Ref Range    QRS Duration 74 ms    OHS QTC Calculation 442 ms    Narrative    Test Reason : R07.9,    Vent. Rate : 079 BPM     Atrial Rate : 079 BPM     P-R Int : 144 ms          QRS Dur : 074 ms      QT Int : 386 ms       P-R-T Axes : 060 020 030 degrees     QTc Int : 442 ms    Normal sinus rhythm  Low voltage QRS  Borderline Abnormal ECG  When compared with ECG of 11-NOV-2020 11:01,  Nonspecific T wave abnormality no longer evident in Lateral leads  Confirmed by Lito Morelos MD ([id number]) on 6/6/2024 10:41:30 PM    Referred By: [email or web address]   SELF           Confirmed By:Lito Morelos MD     No valid procedures specified.   Results for orders placed during the hospital encounter of 07/23/24    Exercise Stress - EKG    Interpretation Summary    The patient exercised for 3 minutes  0 seconds on a Luis Felipe protocol, corresponding to a functional capacity of 5METS, achieving a peak heart rate of 151 bpm, which is 104% of the age predicted maximum heart rate.    The ECG portion of the study is abnormal but not diagnostic.    The patient reported no chest pain during the stress test.    The blood pressure response to stress was normal.    There were no arrhythmias during stress.      Physical Exam:  CONSTITUTIONAL: No fever, no chills  HEENT: Normocephalic, atraumatic,pupils reactive to light                 NECK:  No JVD no carotid bruit  CVS: S1S2+, RRR, systolic murmurs,   LUNGS: Clear  ABDOMEN: Soft, NT, BS+  EXTREMITIES: No cyanosis, edema  : No cottrell catheter  NEURO: AAO X 3  PSY: Normal affect      Medication List with Changes/Refills   Current Medications    ACETAMINOPHEN (TYLENOL) 325 MG TABLET    Take 325 mg by mouth every 6 (six) hours as needed for Pain.    ALBUTEROL (PROVENTIL) 2.5 MG /3 ML (0.083 %) NEBULIZER SOLUTION    Take 3 mLs (2.5 mg total) by nebulization every 6 (six) hours as needed for Wheezing. Rescue    ALBUTEROL (PROVENTIL/VENTOLIN HFA) 90 MCG/ACTUATION INHALER    Inhale 2 puffs into the lungs every 4 (four) hours as needed for Wheezing or Shortness of Breath.    ASPIRIN 325 MG TABLET    Take 1 tablet (325 mg total) by mouth once daily.    BUDESONIDE (PULMICORT) 0.5 MG/2 ML NEBULIZER SOLUTION    Take 2 mLs (0.5 mg total) by nebulization 2 (two) times daily. Controller    DIPHENHYDRAMINE (BENADRYL) 25 MG CAPSULE    Take 50 mg by mouth nightly as needed for Itching.    FLUTICASONE-UMECLIDIN-VILANTER (TRELEGY ELLIPTA) 200-62.5-25 MCG INHALER    Inhale 1 puff into the lungs once daily.    LEVOTHYROXINE (SYNTHROID) 50 MCG TABLET    Take 1 tablet (50 mcg total) by mouth once daily. Except take 1.5 tablets one day per week    PANTOPRAZOLE (PROTONIX) 40 MG TABLET    Take 1 tablet (40 mg total) by mouth once daily.    PITAVASTATIN CALCIUM (LIVALO) 2 MG TAB TABLET    Take 1 tablet  (2 mg total) by mouth every evening.    PREDNISONE (DELTASONE) 20 MG TABLET    Take one pill a day for three days, repeat for shortness of breath             Assessment:       1. Chest pain, unspecified type    2. Dyspnea on exertion    3. Essential hypertension    4. Mixed hyperlipidemia    5. Severe obesity    6. Nonspecific abnormal electrocardiogram (ECG) (EKG)    7. Acquired hypothyroidism    8. Mild intermittent asthma without complication         Plan:   1. Chest pain   Patient underwent stress test and she did not have any chest pain while she was on the treadmill.  There were no diagnostic EKG changes suggest any active ischemia.    Since the last time patient has not had any chest discomfort.    And she is feeling much better.    2. Dyspnea on exertion   It is much more stable at the present time.  3. Essential hypertension   Her blood pressure is elevated 140/88  Patient is coming in here for anticipation of her results.    Patient's blood pressure is normally well controlled at home.    Discussed with patient and  in regards with checking her blood pressure at home as soon as she wakes up, before breakfast, before lunch, before dinner and bedtime.  For 1 week and will readjust the medications accordingly.  4. Mixed hyperlipidemia   Patient is doing well at the present time she is on pravastatin which was recently started by her primary physician at 2 mg per day and patient will follow-up with the primary physician is going to check her cholesterol.  On her last blood work her total cholesterol was 238 HDL was 74 and  LDL was 136 and triglycerides were 159.  Patient follows up with the primary physician is for to check the liver function tests and cholesterol panel.  5. Obesity   Patient is currently weighing 206 lb patient to work on losing more weight.  Continue low-fat low-cholesterol diet.  6. Patient to continue current management and I will see her back in the office in 6 months time.   Discussed with patient and  that if she should have any symptoms she should come back to see us or go to the emergency room immediately.          Problem List Items Addressed This Visit          Pulmonary    Asthma       Endocrine    Hypothyroidism     Other Visit Diagnoses       Chest pain, unspecified type    -  Primary    Dyspnea on exertion        Essential hypertension        Mixed hyperlipidemia        Severe obesity        Nonspecific abnormal electrocardiogram (ECG) (EKG)                No follow-ups on file.

## 2024-10-02 DIAGNOSIS — J45.50 SEVERE PERSISTENT ASTHMA WITHOUT COMPLICATION: ICD-10-CM

## 2024-10-02 RX ORDER — FLUTICASONE FUROATE, UMECLIDINIUM BROMIDE AND VILANTEROL TRIFENATATE 200; 62.5; 25 UG/1; UG/1; UG/1
1 POWDER RESPIRATORY (INHALATION) DAILY
Qty: 180 EACH | Refills: 3 | Status: SHIPPED | OUTPATIENT
Start: 2024-10-02

## 2024-12-13 ENCOUNTER — OFFICE VISIT (OUTPATIENT)
Dept: PULMONOLOGY | Facility: CLINIC | Age: 69
End: 2024-12-13
Payer: MEDICARE

## 2024-12-13 VITALS
WEIGHT: 210 LBS | HEART RATE: 83 BPM | SYSTOLIC BLOOD PRESSURE: 150 MMHG | HEIGHT: 62 IN | OXYGEN SATURATION: 97 % | DIASTOLIC BLOOD PRESSURE: 93 MMHG | BODY MASS INDEX: 38.64 KG/M2

## 2024-12-13 DIAGNOSIS — J45.50 SEVERE PERSISTENT ASTHMA WITHOUT COMPLICATION: Primary | ICD-10-CM

## 2024-12-13 DIAGNOSIS — R05.1 ACUTE COUGH: ICD-10-CM

## 2024-12-13 PROCEDURE — 99999 PR PBB SHADOW E&M-EST. PATIENT-LVL III: CPT | Mod: PBBFAC,,, | Performed by: NURSE PRACTITIONER

## 2024-12-13 PROCEDURE — 99213 OFFICE O/P EST LOW 20 MIN: CPT | Mod: PBBFAC,PO | Performed by: NURSE PRACTITIONER

## 2024-12-13 RX ORDER — PREDNISONE 20 MG/1
TABLET ORAL
Qty: 12 TABLET | Refills: 0 | Status: SHIPPED | OUTPATIENT
Start: 2024-12-13

## 2024-12-13 RX ORDER — BUDESONIDE 0.5 MG/2ML
0.5 INHALANT ORAL 2 TIMES DAILY
Qty: 120 ML | Refills: 0 | Status: SHIPPED | OUTPATIENT
Start: 2024-12-13 | End: 2025-12-13

## 2024-12-13 RX ORDER — CODEINE PHOSPHATE AND GUAIFENESIN 10; 100 MG/5ML; MG/5ML
5 SOLUTION ORAL EVERY 4 HOURS PRN
Qty: 210 ML | Refills: 0 | Status: SHIPPED | OUTPATIENT
Start: 2024-12-13 | End: 2024-12-20

## 2024-12-13 RX ORDER — ALBUTEROL SULFATE 90 UG/1
2 INHALANT RESPIRATORY (INHALATION) EVERY 4 HOURS PRN
Qty: 60.3 G | Refills: 11 | Status: SHIPPED | OUTPATIENT
Start: 2024-12-13

## 2024-12-13 RX ORDER — FLUTICASONE FUROATE, UMECLIDINIUM BROMIDE AND VILANTEROL TRIFENATATE 200; 62.5; 25 UG/1; UG/1; UG/1
1 POWDER RESPIRATORY (INHALATION) DAILY
Qty: 180 EACH | Refills: 3 | Status: SHIPPED | OUTPATIENT
Start: 2024-12-13

## 2024-12-13 RX ORDER — ALBUTEROL SULFATE 0.83 MG/ML
2.5 SOLUTION RESPIRATORY (INHALATION) EVERY 6 HOURS PRN
Qty: 180 ML | Refills: 3 | Status: SHIPPED | OUTPATIENT
Start: 2024-12-13

## 2024-12-13 NOTE — PROGRESS NOTES
12/13/2024    Michelle Frazier      Chief Complaint   Patient presents with    Follow-up    medication refills       HPI:   12/13/2024- states doing well, no exacerbations while on Trelegy daily. Mild complaint of shortness of breath with weather changes, improves with albuterol rescue use.     3/13/2024- states doing well, diagnosed COVID 19 in December, milder case, complaint of sinus congestion and headache. Treated with albuterol nebulizer with benefit.   Had residual cough for months. States finally feeling back to normal.     States her cough is stable, she is triggered by strong perfumes and vacuum  at work, using albuterol rescue on average 2x weekly, nocturnal cough most nights when bed is flat. No issue when bed is raised. On GERD medication.   On Trelegy daily,     8/7/2023-noticed improvement in breathing after transferring to [place] office from other office. On Trelegy daily with benefit. Needing albuterol rescue 1-2 x weekly only when triggered with noxious smells such as lysol or perfume.   Has cough syrup needing 1-2 times monthly for coughing fits.     No asthma exacerbations since last visit.       1/6/23- states asthma was well controlled for months, took a break from daily inahalers for 4 months, did have complaint of chest tightness and wheeze that returned. Restarted on daily Trelegy with benefit.   Had asthma exacerbation 3 weeks earlier while at work, improved with albuterol rescue inhaler. States she still does not feel back to normal. Has persistent chest tightness associated with hoarse voice, improves with albuterol inhaler. Using nebulizer nightly for past 3 weeks.   Having nocturnal coughing fits nightly, difficult to sleep.       7/6/2022- currently recovering from Gallbladder removal. States breathing is improved following surgery due to being away from office. On Trelegy daily with benefit.   No current complaint of cough, wheeze,  SOB- stable, only with exertion, not exerting  herself due to recent surgery    3/24/2022- States asthma has improved while on Trelegy 200 daily, noticed her triggers are sudden changes in weather and strong smells.   Complaint of chest tightness- recurrent complaint, 3 times weekly, improves with albuterol rescue, worse when feeling stressed. Associated with wheeze.   Cough- non productive, worse when laying down, nocturnal arousals most nights of week.   Had pneumonia vaccine.     Had to take prednisone for asthma attack last January, felt much better after 3 days of prednisone.   Has asthma attack while showering due to exposure to , considered going to ER so severe, took multiple inhalation of albuterol inhaler to improve.        8/4/2021- States SOB is improved, improves with albuterol rescue inhaler 3x weekly, worse when noxious smells are inhaled such as fresh paint. Used inhaler this morning with chest tightness. On Trelegy with great benefit. Cough- improved, worse when sinus drain. No current complaint of wheeze.     5/4/2021- SOB- currently a daily complaint, worse with exertion, lives with stairs and has to rest after walking up stairs.   Cough- worse at night, worsened in last 2 months, stopped taking Trelegy daily until January 2021. Improves with albuterol rescue having to use 2x daily. States worse when she smells strong odors or gets worse.   Associated with chest tightness and wheeze.     12/28/2020- rash is improved, no longer painful with scabbed blisters.   SOB- recurrent problem, improved during the day, Worse with exertion, has severe SOB when laying down at night. causes severe anxiety gasping for air 2x times in last 4 weeks.  Improved with albuterol rescue inhaler. Using oxygen at night,     11/24/2020- new onset rash, onset 5 days. Started with back ache that improved with over the counter aleve and a few red spots on lower back that spread across entire back and around to abdomen on left side. Describes as burning, tingling  pins sticking in skin 7 on 0-10 pain scale.     SOB- worse when wearing mask and exertion walking up stairs, wearing supplemental oxygen at 2 L during day as needed.  Associated with chest tightness worse when bending over and fatigue.   No cough,     Dr. Fagan consult:  consult by Dr Fagan:   History of Present Illness:  Pt healthy, lives in [place] with .  Has good function, developed headaches and n/v/d last 10 days , takes in very little.  Voiding minimally.  Pt developed sob ppt er visit.  covid + on  .  Breathing well now on nc ox.  Headache severe, not eating. Plan:   temperature max 101.6, ferritin 458, creatinine 0.7, chest x-ray ground-glass opacity in the lateral left lung-right lung looks clear.   Will give couple liters d5 .   norco for headahce prn   Discussed remdesivir and plasma- kodi late in course but may have some benefit- pt wishes to receive.  sterroids ordered. Discussed bipap/vent if needed.         The chief compliant  problem varies with instablilty at time    PFSH:  Past Medical History:   Diagnosis Date    Arthralgia of multiple joints 10/1/2020    Diverticulosis of intestine 10/1/2020    Dyslipidemia 10/1/2020    GERD (gastroesophageal reflux disease)     Hypothyroidism          Past Surgical History:   Procedure Laterality Date    APPENDECTOMY      CARPAL TUNNEL RELEASE Right     CHOLECYSTECTOMY      COLONOSCOPY      EYE SURGERY      cataracts    LAPAROSCOPIC CHOLECYSTECTOMY N/A 2022    Procedure: CHOLECYSTECTOMY, LAPAROSCOPIC;  Surgeon: Cristian Park Jr., MD;  Location: [facility];  Service: General;  Laterality: N/A;     Social History     Tobacco Use    Smoking status: Former     Current packs/day: 0.00     Types: Cigarettes     Quit date: 2012     Years since quittin.5    Smokeless tobacco: Never   Substance Use Topics    Alcohol use: No    Drug use: No     Family History   Problem Relation Name Age of Onset    Arthritis Mother       "Hearing loss Mother      Vision loss Mother      Arthritis Father      Colon cancer Paternal Grandmother          >80    Breast cancer Paternal Grandmother  65     Review of patient's allergies indicates:  No Known Allergies  I have reviewed past medical, family, and social history. I have reviewed previous nurse notes.    Performance Status:The patient's activity level is housebound activities.      Review of Systems:  a review of eleven systems covering constitutional, Eye, HEENT, Psych, Respiratory, Cardiac, GI, , Musculoskeletal, Endocrine, Dermatologic was negative except for pertinent findings as listed ABOVE and below: pertinent positive as above, rest is good  Shortness of breath, chest tightness, wheeze.          Exam:Comprehensive exam done. BP (!) 150/93 (BP Location: Right arm, Patient Position: Sitting)   Pulse 83   Ht 5' 2" (1.575 m)   Wt 95.2 kg (209 lb 15.8 oz)   SpO2 97% Comment: on room air at rest  BMI 38.41 kg/m²   Exam included Vitals as listed, and patient's appearance and affect and alertness and mood, oral exam for yeast and hygiene and pharynx lesions and Mallapatti (M) score, neck with inspection for jvd and masses and thyroid abnormalities and lymph nodes (supraclavicular and infraclavicular nodes and axillary also examined and noted if abn), chest exam included symmetry and effort and fremitus and percussion and auscultation, cardiac exam included rhythm and gallops and murmur and rubs and jvd and edema, abdominal exam for mass and hepatosplenomegaly and tenderness and hernias and bowel sounds, Musculoskeletal exam with muscle tone and posture and mobility/gait and  strength, and skin for rashes and cyanosis and pallor and turgor, extremity for clubbing.  Findings were normal except for pertinent findings listed below: M2, BS clear        Radiographs (ct chest and cxr) reviewed: view by direct vision   X-Ray Chest 1 View  11/13/2020   There is bibasilar atelectasis versus " infiltrate new from prior exam.     X-Ray Chest AP Portable 11/11/2020   A peripheral interstitial infiltrate is identified in the left lung but not in the right lung.  This is an unusual pattern but viral pneumonitis is still suspected       Labs reviewed    Lab Results   Component Value Date    WBC 5.90 06/04/2024    RBC 4.79 06/04/2024    HGB 14.6 06/04/2024    HCT 43.9 06/04/2024    MCV 92 06/04/2024    MCH 30.5 06/04/2024    MCHC 33.3 06/04/2024    RDW 14.2 06/04/2024     06/04/2024    MPV 9.9 06/04/2024    GRAN 4.1 06/04/2024    GRAN 69.6 06/04/2024    LYMPH 1.4 06/04/2024    LYMPH 24.1 06/04/2024    MONO 0.3 06/04/2024    MONO 4.7 06/04/2024    EOS 0.0 06/04/2024    BASO 0.05 06/04/2024    EOSINOPHIL 0.5 06/04/2024    BASOPHIL 0.8 06/04/2024         PFT reviewed  Pulmonary Functions Testing Results:  Spirometry with bronchodilator, lung volume by gas dilution, and diffusion capacity measured December 29, 2020. The FEV1 to FVC ratio was 80% indicating no airflow obstruction was measured by spirometry. The FEV1 was 117% of predicted at 2.5 L. There was    no improvement following bronchodilator. Total lung capacity on lung volume by gas dilution was 95% of predicted and normal. Diffusion also was normal at 81% predicted.Study is normal. Clinical correlation recommended. There was no bronchodilator   response measured.     Plan:  Clinical impression is apparently straight forward and impression with management as below.    Michelle was seen today for follow-up and medication refills.    Diagnoses and all orders for this visit:    Severe persistent asthma without complication  -     fluticasone-umeclidin-vilanter (TRELEGY ELLIPTA) 200-62.5-25 mcg inhaler; Inhale 1 puff into the lungs once daily.  -     albuterol (PROVENTIL/VENTOLIN HFA) 90 mcg/actuation inhaler; Inhale 2 puffs into the lungs every 4 (four) hours as needed for Wheezing or Shortness of Breath.  -     albuterol (PROVENTIL) 2.5 mg /3 mL (0.083 %)  nebulizer solution; Take 3 mLs (2.5 mg total) by nebulization every 6 (six) hours as needed for Wheezing. Rescue  -     budesonide (PULMICORT) 0.5 mg/2 mL nebulizer solution; Take 2 mLs (0.5 mg total) by nebulization 2 (two) times daily. Controller  -     guaiFENesin-codeine 100-10 mg/5 ml (TUSSI-ORGANIDIN NR)  mg/5 mL syrup; Take 5 mLs by mouth every 4 (four) hours as needed for Cough.  -     predniSONE (DELTASONE) 20 MG tablet; Take one pill a day for three days, repeat for shortness of breath    Acute cough  -     guaiFENesin-codeine 100-10 mg/5 ml (TUSSI-ORGANIDIN NR)  mg/5 mL syrup; Take 5 mLs by mouth every 4 (four) hours as needed for Cough.        Follow up in about 1 year (around 12/13/2025), or if symptoms worsen or fail to improve.    Discussed with patient above for education the following:      Patient Instructions   Continue current Asthma medication regiment

## 2025-05-19 DIAGNOSIS — Z12.31 ENCOUNTER FOR SCREENING MAMMOGRAM FOR MALIGNANT NEOPLASM OF BREAST: Primary | ICD-10-CM

## (undated) DEVICE — CANNULA ENDOPATH XCEL 5X100MM

## (undated) DEVICE — TROCAR ENDOPATH XCEL 11MM 10CM

## (undated) DEVICE — NDL INSUF ULTRA VERESS 120MM

## (undated) DEVICE — KIT ANTIFOG W/SPONG & FLUID

## (undated) DEVICE — DISSECTOR CURVED 5DCD

## (undated) DEVICE — SUT 3-0 VICRYL / SH (J416)

## (undated) DEVICE — SOL WATER STRL IRR 1000ML

## (undated) DEVICE — SEE MEDLINE ITEM 157117

## (undated) DEVICE — PACK CUSTOM ENDO CHOLO SLI

## (undated) DEVICE — SCISSOR 5MMX35CM DIRECT DRIVE

## (undated) DEVICE — GLOVE SURG ULTRA TOUCH 7

## (undated) DEVICE — NDL SAFETY 21G X 1 1/2 ECLPSE

## (undated) DEVICE — LINER SUCTION 3000CC

## (undated) DEVICE — BAG TISS RETRV MONARCH 10MM

## (undated) DEVICE — TROCAR ENDOPATH XCEL 5X100MM

## (undated) DEVICE — SYR 10CC LUER LOCK

## (undated) DEVICE — SUT MONOCRYL 4-0 PS-2

## (undated) DEVICE — SLEEVE SCD EXPRESS KNEE MEDIUM

## (undated) DEVICE — SET TUBE PNEUMOCLEAR SE HI FLO

## (undated) DEVICE — APPLIER CLIP ENDO MED/LG 10MM

## (undated) DEVICE — BLADE SURG CARBON STEEL SZ11

## (undated) DEVICE — KIT PROCEDURE STER INLET CLOSU

## (undated) DEVICE — SUT 0 VICRYL / UR6 (J603)

## (undated) DEVICE — ELECTRODE REM PLYHSV RETURN 9

## (undated) DEVICE — ADHESIVE DERMABOND ADVANCED

## (undated) DEVICE — APPLICATOR CHLORAPREP ORN 26ML

## (undated) DEVICE — TOWEL OR DISP STRL BLUE 4/PK

## (undated) DEVICE — CLOSURE SKIN STERI STRIP 1/2X4

## (undated) DEVICE — APPLIER CLIP EPIX UNIV 5X34

## (undated) DEVICE — STRAP OR TABLE 5IN X 72IN